# Patient Record
Sex: FEMALE | Race: WHITE | NOT HISPANIC OR LATINO | Employment: OTHER | URBAN - METROPOLITAN AREA
[De-identification: names, ages, dates, MRNs, and addresses within clinical notes are randomized per-mention and may not be internally consistent; named-entity substitution may affect disease eponyms.]

---

## 2017-01-25 ENCOUNTER — HOSPITAL ENCOUNTER (OUTPATIENT)
Dept: RADIOLOGY | Facility: HOSPITAL | Age: 82
Discharge: HOME | End: 2017-01-25
Attending: INTERNAL MEDICINE
Payer: MEDICARE

## 2017-01-25 DIAGNOSIS — Z00.00 HEALTH CARE MAINTENANCE: ICD-10-CM

## 2017-01-25 DIAGNOSIS — Z12.31 ENCOUNTER FOR SCREENING MAMMOGRAM FOR MALIGNANT NEOPLASM OF BREAST: ICD-10-CM

## 2017-01-25 PROCEDURE — 77063 BREAST TOMOSYNTHESIS BI: CPT | Mod: 26,,, | Performed by: RADIOLOGY

## 2017-01-25 PROCEDURE — 77067 SCR MAMMO BI INCL CAD: CPT | Mod: TC

## 2017-01-25 PROCEDURE — 77067 SCR MAMMO BI INCL CAD: CPT | Mod: 26,,, | Performed by: RADIOLOGY

## 2017-03-30 RX ORDER — METFORMIN HYDROCHLORIDE 500 MG/1
TABLET ORAL
Qty: 90 TABLET | Refills: 0 | Status: SHIPPED | OUTPATIENT
Start: 2017-03-30 | End: 2017-06-27 | Stop reason: SDUPTHER

## 2017-04-10 RX ORDER — LANCETS
EACH MISCELLANEOUS
Qty: 200 EACH | Refills: 10 | Status: SHIPPED | OUTPATIENT
Start: 2017-04-10

## 2017-05-08 RX ORDER — BLOOD SUGAR DIAGNOSTIC
STRIP MISCELLANEOUS
Qty: 50 EACH | Refills: 8 | Status: SHIPPED | OUTPATIENT
Start: 2017-05-08 | End: 2018-05-09 | Stop reason: SDUPTHER

## 2017-06-27 ENCOUNTER — TELEPHONE (OUTPATIENT)
Dept: INTERNAL MEDICINE | Facility: CLINIC | Age: 82
End: 2017-06-27

## 2017-06-27 DIAGNOSIS — E11.69 CONTROLLED TYPE 2 DIABETES MELLITUS WITH OTHER SPECIFIED COMPLICATION: Primary | ICD-10-CM

## 2017-06-27 RX ORDER — METFORMIN HYDROCHLORIDE 500 MG/1
TABLET ORAL
Qty: 30 TABLET | Refills: 0 | Status: SHIPPED | OUTPATIENT
Start: 2017-06-27 | End: 2017-07-03 | Stop reason: SDUPTHER

## 2017-06-28 NOTE — TELEPHONE ENCOUNTER
Tired to call patient no answer left message on voicemail for patient to call back to schedule lab appointment

## 2017-06-29 ENCOUNTER — LAB VISIT (OUTPATIENT)
Dept: LAB | Facility: HOSPITAL | Age: 82
End: 2017-06-29
Attending: INTERNAL MEDICINE
Payer: MEDICARE

## 2017-06-29 DIAGNOSIS — E11.69 CONTROLLED TYPE 2 DIABETES MELLITUS WITH OTHER SPECIFIED COMPLICATION: ICD-10-CM

## 2017-06-29 LAB
CHOLEST/HDLC SERPL: 4 {RATIO}
ESTIMATED AVG GLUCOSE: 154 MG/DL
HBA1C MFR BLD HPLC: 7 %
HDL/CHOLESTEROL RATIO: 25.2 %
HDLC SERPL-MCNC: 143 MG/DL
HDLC SERPL-MCNC: 36 MG/DL
LDLC SERPL CALC-MCNC: 86.2 MG/DL
NONHDLC SERPL-MCNC: 107 MG/DL
TRIGL SERPL-MCNC: 104 MG/DL

## 2017-06-29 PROCEDURE — 36415 COLL VENOUS BLD VENIPUNCTURE: CPT | Mod: PO

## 2017-06-29 PROCEDURE — 83036 HEMOGLOBIN GLYCOSYLATED A1C: CPT

## 2017-06-29 PROCEDURE — 80061 LIPID PANEL: CPT

## 2017-07-02 NOTE — PROGRESS NOTES
HISTORY OF PRESENT ILLNESS:  Pt. is a 81 y.o. female presents for monitoring of her DM Type 2, hyperlipdiemia, HTN.  She is UTD with her penumonia vaccines.  She felt Tdap was too expensive, and did not get.  She saw Dr. Aline Galo in [place] for eye exam.    Lab Results   Component Value Date    WBC 11.36 01/04/2016    HGB 12.1 01/04/2016    HCT 38.7 01/04/2016     01/04/2016    CHOL 143 06/29/2017    TRIG 104 06/29/2017    HDL 36 (L) 06/29/2017    ALT 15 12/05/2016    AST 23 12/05/2016     12/05/2016    K 5.3 (H) 12/05/2016     12/05/2016    CREATININE 1.1 12/05/2016    BUN 17 12/05/2016    CO2 25 12/05/2016    TSH 2.353 12/05/2016    HGBA1C 7.0 (H) 06/29/2017     Lab Results   Component Value Date    LDLCALC 86.2 06/29/2017               ROS:  GENERAL: No fever, chills, positive situational fatigability; no weight loss.  SKIN: No rashes, itching or changes in color or texture of skin.  HEAD: No headaches or recent head trauma.  EARS: Denies ear pain, discharge or vertigo.  NOSE: No loss of smell, no epistaxis or postnasal drip.  MOUTH & THROAT: No hoarseness or change in voice. No excessive gum bleeding.  NODES: Denies swollen glands.  CHEST: Denies VILLARREAL, cyanosis, wheezing, cough and sputum production.  CARDIOVASCULAR: Denies chest pain, PND, orthopnea or reduced exercise tolerance.  ABDOMEN: Appetite fine. No weight loss. Denies constipation, diarrhea, abdominal pain, hematemesis or blood in stool.  URINARY: No flank pain, dysuria or hematuria.  PERIPHERAL VASCULAR: No claudication or cyanosis. No edema.  MUSCULOSKELETAL: No joint stiffness or swelling. Denies back pain.  NEUROLOGIC: Denies numbness    PE:   Vitals:   Vitals:    07/03/17 0835   BP: 126/70   Pulse: 72   Resp: 18   Temp: 98.1 °F (36.7 °C)     GENERAL: no acute distress, A&Ox3, comfortable.  Female with BMI of 30   HEENT: tympanic membranes clear, nasal mucosa pink, no pharyngeal erythema or exudate  NECK: supple, no cervical  lymphadenopathy, no thyromegaly; no supraclavicular nodes;   CHEST:  clear to auscultation bilaterally, no crackles or wheeze; no increased work of breathing;  CARDIOVASCULAR: regular rate and rhythm, no rubs, murmurs or gallops.  ABDOMEN: normal bowel sounds, soft non-tender, non-distended; no palpable organomegaly;   EXT: no clubbing, cyanosis or edema.     Protective Sensation (w/ 10 gram monofilament):WNL  Right: Intact  Left: Intact    Visual Inspection:  Normal -  Bilateral and Dry Skin -  Bilateral; positive hair growth;    Pedal Pulses:   Right: Present  Left: Present    Posterior tibialis:   Right:Present  Left: Present        ASSESSMENT/PLAN:    DM type 2/controlled: in control on current meds; will continue;    Type 2 diabetes mellitus with hyperlipidemia  -     glimepiride (AMARYL) 2 MG tablet; Take 1 tablet (2 mg total) by mouth daily with breakfast.  Dispense: 90 tablet; Refill: 1  -     metformin (GLUCOPHAGE) 500 MG tablet; TAKE ONE-HALF TABLET BY MOUTH TWICE DAILY WITH MEALS.  Dispense: 90 tablet; Refill: 1    Hypertension associated with diabetes  -     glimepiride (AMARYL) 2 MG tablet; Take 1 tablet (2 mg total) by mouth daily with breakfast.  Dispense: 90 tablet; Refill: 1  -     metformin (GLUCOPHAGE) 500 MG tablet; TAKE ONE-HALF TABLET BY MOUTH TWICE DAILY WITH MEALS.  Dispense: 90 tablet; Refill: 1    Renal insufficiency  -     Comprehensive metabolic panel; Future; Expected date: 07/03/2017      Call if condition changes or worsens.

## 2017-07-03 ENCOUNTER — LAB VISIT (OUTPATIENT)
Dept: LAB | Facility: HOSPITAL | Age: 82
End: 2017-07-03
Attending: INTERNAL MEDICINE
Payer: MEDICARE

## 2017-07-03 ENCOUNTER — OFFICE VISIT (OUTPATIENT)
Dept: INTERNAL MEDICINE | Facility: CLINIC | Age: 82
End: 2017-07-03
Payer: MEDICARE

## 2017-07-03 VITALS
SYSTOLIC BLOOD PRESSURE: 126 MMHG | BODY MASS INDEX: 30.14 KG/M2 | DIASTOLIC BLOOD PRESSURE: 70 MMHG | HEIGHT: 59 IN | RESPIRATION RATE: 18 BRPM | WEIGHT: 149.5 LBS | TEMPERATURE: 98 F | HEART RATE: 72 BPM

## 2017-07-03 DIAGNOSIS — I15.2 HYPERTENSION ASSOCIATED WITH DIABETES: ICD-10-CM

## 2017-07-03 DIAGNOSIS — E78.5 TYPE 2 DIABETES MELLITUS WITH HYPERLIPIDEMIA: ICD-10-CM

## 2017-07-03 DIAGNOSIS — N28.9 RENAL INSUFFICIENCY: ICD-10-CM

## 2017-07-03 DIAGNOSIS — E11.69 CONTROLLED TYPE 2 DIABETES MELLITUS WITH OTHER SPECIFIED COMPLICATION, WITHOUT LONG-TERM CURRENT USE OF INSULIN: Primary | ICD-10-CM

## 2017-07-03 DIAGNOSIS — E11.69 TYPE 2 DIABETES MELLITUS WITH HYPERLIPIDEMIA: ICD-10-CM

## 2017-07-03 DIAGNOSIS — E11.59 HYPERTENSION ASSOCIATED WITH DIABETES: ICD-10-CM

## 2017-07-03 LAB
ALBUMIN SERPL BCP-MCNC: 3.4 G/DL
ALP SERPL-CCNC: 77 U/L
ALT SERPL W/O P-5'-P-CCNC: 15 U/L
ANION GAP SERPL CALC-SCNC: 7 MMOL/L
AST SERPL-CCNC: 18 U/L
BILIRUB SERPL-MCNC: 0.4 MG/DL
BUN SERPL-MCNC: 23 MG/DL
CALCIUM SERPL-MCNC: 9.1 MG/DL
CHLORIDE SERPL-SCNC: 103 MMOL/L
CO2 SERPL-SCNC: 28 MMOL/L
CREAT SERPL-MCNC: 1 MG/DL
EST. GFR  (AFRICAN AMERICAN): >60 ML/MIN/1.73 M^2
EST. GFR  (NON AFRICAN AMERICAN): 53.3 ML/MIN/1.73 M^2
GLUCOSE SERPL-MCNC: 155 MG/DL
POTASSIUM SERPL-SCNC: 4.9 MMOL/L
PROT SERPL-MCNC: 7.5 G/DL
SODIUM SERPL-SCNC: 138 MMOL/L

## 2017-07-03 PROCEDURE — 1126F AMNT PAIN NOTED NONE PRSNT: CPT | Mod: S$GLB,,, | Performed by: INTERNAL MEDICINE

## 2017-07-03 PROCEDURE — 80053 COMPREHEN METABOLIC PANEL: CPT

## 2017-07-03 PROCEDURE — 1159F MED LIST DOCD IN RCRD: CPT | Mod: S$GLB,,, | Performed by: INTERNAL MEDICINE

## 2017-07-03 PROCEDURE — 99999 PR PBB SHADOW E&M-EST. PATIENT-LVL III: CPT | Mod: PBBFAC,,, | Performed by: INTERNAL MEDICINE

## 2017-07-03 PROCEDURE — 36415 COLL VENOUS BLD VENIPUNCTURE: CPT | Mod: PO

## 2017-07-03 PROCEDURE — 99214 OFFICE O/P EST MOD 30 MIN: CPT | Mod: S$GLB,,, | Performed by: INTERNAL MEDICINE

## 2017-07-03 RX ORDER — GLIMEPIRIDE 2 MG/1
2 TABLET ORAL
Qty: 90 TABLET | Refills: 1 | Status: SHIPPED | OUTPATIENT
Start: 2017-07-03 | End: 2018-01-31 | Stop reason: SDUPTHER

## 2017-07-03 RX ORDER — METFORMIN HYDROCHLORIDE 500 MG/1
TABLET ORAL
Qty: 90 TABLET | Refills: 1 | Status: SHIPPED | OUTPATIENT
Start: 2017-07-03 | End: 2018-01-31 | Stop reason: SDUPTHER

## 2017-07-04 PROBLEM — N28.9 RENAL INSUFFICIENCY: Status: ACTIVE | Noted: 2017-07-04

## 2017-07-05 ENCOUNTER — TELEPHONE (OUTPATIENT)
Dept: ADMINISTRATIVE | Facility: HOSPITAL | Age: 82
End: 2017-07-05

## 2017-07-05 NOTE — LETTER
July 5, 2017    Aline Galo             [facility]  [address]  [facility] [address]  Phone: [phone] July 5, 2017     Patient: Dolores Dolores LeJeune    YOB: 1935   Date of Visit: 7/5/2017       To Whom It May Concern:                                                                                    [facility]    We are seeing Dolores B LeJeune in the clinic today at [facility].  Hiwot Garces MD is their PCP.  She/He has an outstanding lab/procedure at this time when reviewing their chart.  To help with our Health Maintenance records will you please supply the following:        [x]  Eye Exam                                           Please Fax to [facility] at [phone]    Thank you for your help, Rajani Rasmussen LPN-[facility].  If I can be of any assistance you can call at [phone]        [facility]         If you have any questions or concerns, please don't hesitate to call.    Sincerely,        Hiwot Garces MD

## 2017-07-25 RX ORDER — GABAPENTIN 300 MG/1
CAPSULE ORAL
Qty: 90 CAPSULE | Refills: 0 | Status: SHIPPED | OUTPATIENT
Start: 2017-07-25 | End: 2017-10-21 | Stop reason: SDUPTHER

## 2017-10-26 RX ORDER — GABAPENTIN 300 MG/1
CAPSULE ORAL
Qty: 90 CAPSULE | Refills: 0 | Status: SHIPPED | OUTPATIENT
Start: 2017-10-26 | End: 2018-01-31 | Stop reason: SDUPTHER

## 2017-12-19 ENCOUNTER — PATIENT OUTREACH (OUTPATIENT)
Dept: ADMINISTRATIVE | Facility: HOSPITAL | Age: 82
End: 2017-12-19

## 2017-12-19 NOTE — LETTER
December 27, 2017    Dolores B LeJeune  [address] [address] [facility] [address]             [facility]  [address]  [facility] [address]  Phone: [phone] Dear Mrs. LeJeune:    [facility] committed to your overall health.  To help you get the most out of each of your visits, we will review your information to make sure you are up to date on all of your recommended tests and or procedures.       Dr. Hiwot Garces MD has found that you may be due for:     Tetanus and flu vaccine   DEXA scan (osteoporosis screening)   Annual diabetic eye exam   Mammogram     If you have not had an eye exam in the past year, we now have a  Retinal Camera at the [facility].  If we do this exam the same day you see a member of your Primary Care team, we can save you from having to pay a second co pay.       If you have had any of the above done at another facility, please bring the records or information with you so that your record at [facility] will be complete and up to date.     If you have not had any of these tests or procedures done recently and would like to complete this testing before your appointment on 1/2/18 at 9:00 am with Hiwot Garces MD please call [phone] or send a message through your [facility] portal to your provider's office.     If you are currently taking medication, please bring it with you to your appointment for review.     Also, if you have any type of Advanced Directives, please bring them with you to your office visit so we may scan them into your chart.     Please feel free to call the number listed below if you have any questions.     Thanks,     Rajani Rasmussen LPN Clinical Care Coordinator   [facility]   [address] [place] [address]   [phone] (p)   [phone] (f)        Additional Information   If you have questions, you can email [employer] [email or web address] or call [phone]  to talk to our [facility] staff. Remember, [facility] is NOT  to be used for urgent needs. For medical emergencies, dial 911.

## 2018-01-01 NOTE — PROGRESS NOTES
HISTORY OF PRESENT ILLNESS:  Pt. is a 82 y.o. female presents presents for monitoring of her DM Type 2, hyperlipdiemia, HTN.  She sees Dr. Aline Galo for her eye exam, she is due this month or next with her, was last seen 6 months ago.  She is coming due for mammogram, hgb A1C, BMD.  She is seeing Dr. Barrera next month of cards in [place].    Health Maintenance Topics with due status: Not Due       Topic Last Completion Date    Foot Exam 07/03/2017    TETANUS VACCINE 01/02/2018    Lipid Panel 01/02/2018    Hemoglobin A1c 01/02/2018     Health Maintenance Due   Topic Date Due    TETANUS VACCINE  07/15/1954/declines;    Influenza Vaccine  08/01/2017/done this year;    DEXA SCAN  11/03/2017    Eye Exam  12/22/2017/UTD with Dr. Galo;    Hemoglobin A1c  12/29/2017    Mammogram  01/25/2018       Lab Results   Component Value Date    WBC 10.95 01/02/2018    HGB 11.1 (L) 01/02/2018    HCT 35.3 (L) 01/02/2018     01/02/2018    CHOL 127 01/02/2018    TRIG 153 (H) 01/02/2018    HDL 34 (L) 01/02/2018    LDLCALC 62.4 (L) 01/02/2018    ALT 18 01/02/2018    AST 19 01/02/2018     01/02/2018    K 4.7 01/02/2018     01/02/2018    CREATININE 1.0 01/02/2018    BUN 17 01/02/2018    CO2 29 01/02/2018    ALBUMIN 3.4 (L) 01/02/2018    TSH 2.353 12/05/2016    HGBA1C 6.8 (H) 01/02/2018       Past Medical History:   Diagnosis Date    Acute MI     Anticoagulant long-term use     Carpal tunnel syndrome     Cataracts, bilateral     Colon polyps     Coronary artery disease     DM type 2 (diabetes mellitus, type 2)     HTN (hypertension)     Hyperlipidemia     Osteoarthritis     Peripheral neuropathy        Past Surgical History:   Procedure Laterality Date    CAROTID STENT      1    COLONOSCOPY  1/8/2008  Gurvinder    A 3 mm by 6 mm polyp in the cecum.  FRAGMENTS OF TUBULAR ADENOMA.    A 1 mm polyp in the recto-sigmoid colon.  HYPERPLASTIC POLYP.    Extremely redundant colon.      EYE SURGERY      eye  surgery    HYSTERECTOMY      ovaries remain    ptyregium      TONSILLECTOMY         Social History     Social History    Marital status:      Spouse name: N/A    Number of children: 4    Years of education: N/A     Social History Main Topics    Smoking status: Former Smoker     Quit date: 3/15/1972    Smokeless tobacco: Never Used    Alcohol use No    Drug use: No    Sexual activity: Not Asked     Other Topics Concern    None     Social History Narrative    None       ROS:  GENERAL: No fever, chills, fatigability or weight loss.  SKIN: No rashes, itching or changes in color or texture of skin.  HEAD: No headaches or recent head trauma.  EARS: Denies ear pain, discharge or vertigo.  NOSE: No loss of smell, no epistaxis; slight postnasal drip.  MOUTH & THROAT: No hoarseness or change in voice. No excessive gum bleeding.  NODES: Denies swollen glands.  CHEST: Denies VILLARREAL, cyanosis, wheezing, cough and sputum production.  CARDIOVASCULAR: Denies chest pain, PND, orthopnea or reduced exercise tolerance.  ABDOMEN: Appetite fine. No weight loss. Denies constipation, diarrhea, abdominal pain, hematemesis or blood in stool.  URINARY: No flank pain, dysuria or hematuria.  PERIPHERAL VASCULAR: No claudication or cyanosis. No edema.  MUSCULOSKELETAL: Occ joint stiffness; no swelling. Denies back pain.  NEUROLOGIC: Denies numbness    PE:   Vitals:   Vitals:    01/02/18 0851   BP: 118/68   Pulse: 75     GENERAL: no acute distress, A&Ox3, comfortable.  Female with BMI of 29   HEENT: tympanic membranes clear, nasal mucosa pink, no pharyngeal erythema or exudate  NECK: supple, no cervical lymphadenopathy, no thyromegaly; no supraclavicular nodes;   CHEST:  clear to auscultation bilaterally, no crackles or wheeze; no increased work of breathing;  CARDIOVASCULAR: regular rate and rhythm, no rubs, murmurs or gallops.  ABDOMEN: normal bowel sounds, soft non-tender, non-distended; no palpable organomegaly;   EXT: no  clubbing, cyanosis or edema.     ASSESSMENT/PLAN:    Type 2 diabetes mellitus with hyperlipidemia  -     Hemoglobin A1c; Future; Expected date: 01/02/2018  -     Comprehensive metabolic panel; Future; Expected date: 01/02/2018  -     Lipid panel; Future; Expected date: 01/02/2018    Hypertension associated with diabetes  -     CBC auto differential; Future; Expected date: 01/02/2018  -     Comprehensive metabolic panel; Future; Expected date: 01/02/2018    Controlled type 2 diabetes mellitus with other specified complication, without long-term current use of insulin    Health care maintenance  -     DXA Bone Density Spine And Hip; Future; Expected date: 02/05/2018  -     Mammo Digital Screening Bilat with CAD; Future; Expected date: 02/05/2018    Other orders  -     Cancel: DXA Bone Density Spine And Hip; Future; Expected date: 01/01/2018  -     Cancel: Ambulatory Referral to Ophthalmology        Medication List with Changes/Refills   Current Medications    ACCU-CHEK RALEIGH PLUS TEST STRP STRP    USE ONE STRIP FOR TESTING TWICE A DAY AS DIRECTED    ACCU-CHEK SOFTCLIX LANCETS MISC    USE ONE AS DIRECTED TWICE DAILY    ASPIRIN (ECOTRIN) 81 MG EC TABLET    Take 81 mg by mouth once daily.    ATORVASTATIN (LIPITOR) 40 MG TABLET        CALCIUM CARBONATE/VITAMIN D3 (CALCIUM 500 WITH D ORAL)    Take by mouth.      DEXTRAN 70/HYPROMELLOSE (ARTIFICIAL TEARS OPHT)    Apply to eye.    ENALAPRIL (VASOTEC) 5 MG TABLET    Take 1 tablet (5 mg total) by mouth once daily.    GABAPENTIN (NEURONTIN) 300 MG CAPSULE    TAKE ONE CAPSULE BY MOUTH DAILY    GLIMEPIRIDE (AMARYL) 2 MG TABLET    Take 1 tablet (2 mg total) by mouth daily with breakfast.    MECLIZINE (ANTIVERT) 25 MG TABLET    Take 1 tablet by mouth Three times daily as needed.    METFORMIN (GLUCOPHAGE) 500 MG TABLET    TAKE ONE-HALF TABLET BY MOUTH TWICE DAILY WITH MEALS.    RUTIN/HESP/BIOFLAV/C/[id number] (BIOFLEX ORAL)    Take 1 tablet by mouth once daily at 6am.     Call if  condition changes or worsens.

## 2018-01-02 ENCOUNTER — LAB VISIT (OUTPATIENT)
Dept: LAB | Facility: HOSPITAL | Age: 83
End: 2018-01-02
Attending: INTERNAL MEDICINE
Payer: MEDICARE

## 2018-01-02 ENCOUNTER — OFFICE VISIT (OUTPATIENT)
Dept: INTERNAL MEDICINE | Facility: CLINIC | Age: 83
End: 2018-01-02
Payer: MEDICARE

## 2018-01-02 ENCOUNTER — TELEPHONE (OUTPATIENT)
Dept: INTERNAL MEDICINE | Facility: CLINIC | Age: 83
End: 2018-01-02

## 2018-01-02 VITALS
WEIGHT: 147.69 LBS | DIASTOLIC BLOOD PRESSURE: 68 MMHG | OXYGEN SATURATION: 98 % | BODY MASS INDEX: 29.83 KG/M2 | HEART RATE: 75 BPM | SYSTOLIC BLOOD PRESSURE: 118 MMHG

## 2018-01-02 DIAGNOSIS — E11.69 CONTROLLED TYPE 2 DIABETES MELLITUS WITH OTHER SPECIFIED COMPLICATION, WITHOUT LONG-TERM CURRENT USE OF INSULIN: ICD-10-CM

## 2018-01-02 DIAGNOSIS — Z00.00 HEALTH CARE MAINTENANCE: ICD-10-CM

## 2018-01-02 DIAGNOSIS — I15.2 HYPERTENSION ASSOCIATED WITH DIABETES: ICD-10-CM

## 2018-01-02 DIAGNOSIS — E78.5 TYPE 2 DIABETES MELLITUS WITH HYPERLIPIDEMIA: Primary | ICD-10-CM

## 2018-01-02 DIAGNOSIS — E11.59 HYPERTENSION ASSOCIATED WITH DIABETES: ICD-10-CM

## 2018-01-02 DIAGNOSIS — D64.9 ANEMIA, UNSPECIFIED TYPE: Primary | ICD-10-CM

## 2018-01-02 DIAGNOSIS — E78.5 TYPE 2 DIABETES MELLITUS WITH HYPERLIPIDEMIA: ICD-10-CM

## 2018-01-02 DIAGNOSIS — E11.69 TYPE 2 DIABETES MELLITUS WITH HYPERLIPIDEMIA: Primary | ICD-10-CM

## 2018-01-02 DIAGNOSIS — E11.69 TYPE 2 DIABETES MELLITUS WITH HYPERLIPIDEMIA: ICD-10-CM

## 2018-01-02 LAB
ALBUMIN SERPL BCP-MCNC: 3.4 G/DL
ALP SERPL-CCNC: 73 U/L
ALT SERPL W/O P-5'-P-CCNC: 18 U/L
ANION GAP SERPL CALC-SCNC: 8 MMOL/L
AST SERPL-CCNC: 19 U/L
BASOPHILS # BLD AUTO: 0.04 K/UL
BASOPHILS NFR BLD: 0.4 %
BILIRUB SERPL-MCNC: 0.5 MG/DL
BUN SERPL-MCNC: 17 MG/DL
CALCIUM SERPL-MCNC: 9.4 MG/DL
CHLORIDE SERPL-SCNC: 104 MMOL/L
CHOLEST SERPL-MCNC: 127 MG/DL
CHOLEST/HDLC SERPL: 3.7 {RATIO}
CO2 SERPL-SCNC: 29 MMOL/L
CREAT SERPL-MCNC: 1 MG/DL
DIFFERENTIAL METHOD: ABNORMAL
EOSINOPHIL # BLD AUTO: 0.3 K/UL
EOSINOPHIL NFR BLD: 2.6 %
ERYTHROCYTE [DISTWIDTH] IN BLOOD BY AUTOMATED COUNT: 13.1 %
EST. GFR  (AFRICAN AMERICAN): >60 ML/MIN/1.73 M^2
EST. GFR  (NON AFRICAN AMERICAN): 53 ML/MIN/1.73 M^2
ESTIMATED AVG GLUCOSE: 148 MG/DL
GLUCOSE SERPL-MCNC: 121 MG/DL
HBA1C MFR BLD HPLC: 6.8 %
HCT VFR BLD AUTO: 35.3 %
HDLC SERPL-MCNC: 34 MG/DL
HDLC SERPL: 26.8 %
HGB BLD-MCNC: 11.1 G/DL
IMM GRANULOCYTES # BLD AUTO: 0.03 K/UL
IMM GRANULOCYTES NFR BLD AUTO: 0.3 %
LDLC SERPL CALC-MCNC: 62.4 MG/DL
LYMPHOCYTES # BLD AUTO: 2.8 K/UL
LYMPHOCYTES NFR BLD: 25.7 %
MCH RBC QN AUTO: 28.6 PG
MCHC RBC AUTO-ENTMCNC: 31.4 G/DL
MCV RBC AUTO: 91 FL
MONOCYTES # BLD AUTO: 0.8 K/UL
MONOCYTES NFR BLD: 6.8 %
NEUTROPHILS # BLD AUTO: 7 K/UL
NEUTROPHILS NFR BLD: 64.2 %
NONHDLC SERPL-MCNC: 93 MG/DL
NRBC BLD-RTO: 0 /100 WBC
PLATELET # BLD AUTO: 286 K/UL
PMV BLD AUTO: 10.7 FL
POTASSIUM SERPL-SCNC: 4.7 MMOL/L
PROT SERPL-MCNC: 7.5 G/DL
RBC # BLD AUTO: 3.88 M/UL
SODIUM SERPL-SCNC: 141 MMOL/L
TRIGL SERPL-MCNC: 153 MG/DL
WBC # BLD AUTO: 10.95 K/UL

## 2018-01-02 PROCEDURE — 80061 LIPID PANEL: CPT

## 2018-01-02 PROCEDURE — 83036 HEMOGLOBIN GLYCOSYLATED A1C: CPT

## 2018-01-02 PROCEDURE — 99214 OFFICE O/P EST MOD 30 MIN: CPT | Mod: S$GLB,,, | Performed by: INTERNAL MEDICINE

## 2018-01-02 PROCEDURE — 85025 COMPLETE CBC W/AUTO DIFF WBC: CPT

## 2018-01-02 PROCEDURE — 80053 COMPREHEN METABOLIC PANEL: CPT

## 2018-01-02 PROCEDURE — 36415 COLL VENOUS BLD VENIPUNCTURE: CPT | Mod: PO

## 2018-01-02 PROCEDURE — 99999 PR PBB SHADOW E&M-EST. PATIENT-LVL III: CPT | Mod: PBBFAC,,, | Performed by: INTERNAL MEDICINE

## 2018-01-02 NOTE — LETTER
January 22, 2018    Dr Clover Guan - Internal Medicine  [address] [facility] [address] [place] [address]  Phone: [phone]  Fax: [phone] January 22, 2018     Patient: Dolores Dolores LeJeune    YOB: 1935   Date of Visit: 1/2/2018       To Whom It May Concern:                                                                                    [facility]    We are seeing Dolores B LeJeune in the clinic today at [facility].  Hiwot Garces MD is their PCP.  She/He has an outstanding lab/procedure at this time when reviewing their chart.  To help with our Health Maintenance records will you please supply the following:       [x]  Eye Exam                                       Please Fax to [facility] at [phone]    Thank you for your help, Rajani Rasmussen LPN-[facility].  If I can be of any assistance you can call at [phone]        [facility]         If you have any questions or concerns, please don't hesitate to call.    Sincerely,        Hiwot Garces MD

## 2018-01-02 NOTE — LETTER
January 8, 2018    Dr Clover Guan - Internal Medicine  [address] [facility] [address] [place] [address]  Phone: [phone]  Fax: [phone] January 8, 2018     Patient: Dolores Dolores LeJeune    YOB: 1935   Date of Visit: 1/2/2018       To Whom It May Concern:                                                                                    [facility]    We are seeing Dolores B LeJeune in the clinic today at [facility].  Hiwot Garces MD is their PCP.  She/He has an outstanding lab/procedure at this time when reviewing their chart.  To help with our Health Maintenance records will you please supply the following:       [x]  Eye Exam                                        Please Fax to [facility] at [phone]    Thank you for your help, Rajani Rasmussen LPN-[facility].  If I can be of any assistance you can call at [phone]        [facility]         If you have any questions or concerns, please don't hesitate to call.    Sincerely,        Hiwot Garces MD

## 2018-01-02 NOTE — TELEPHONE ENCOUNTER
Pt. Is UTD with eye exam with Dr. Aline Galo in [place], had appt. 6 months ago, has appt. Coming up this month or next.

## 2018-01-15 ENCOUNTER — LAB VISIT (OUTPATIENT)
Dept: LAB | Facility: HOSPITAL | Age: 83
End: 2018-01-15
Attending: INTERNAL MEDICINE
Payer: MEDICARE

## 2018-01-15 DIAGNOSIS — D64.9 ANEMIA, UNSPECIFIED TYPE: ICD-10-CM

## 2018-01-15 LAB
FERRITIN SERPL-MCNC: 87 NG/ML
IRON SERPL-MCNC: 75 UG/DL
SATURATED IRON: 25 %
TOTAL IRON BINDING CAPACITY: 303 UG/DL
TRANSFERRIN SERPL-MCNC: 205 MG/DL

## 2018-01-15 PROCEDURE — 82728 ASSAY OF FERRITIN: CPT

## 2018-01-15 PROCEDURE — 83540 ASSAY OF IRON: CPT

## 2018-01-15 PROCEDURE — 36415 COLL VENOUS BLD VENIPUNCTURE: CPT | Mod: PO

## 2018-01-16 ENCOUNTER — PATIENT MESSAGE (OUTPATIENT)
Dept: INTERNAL MEDICINE | Facility: CLINIC | Age: 83
End: 2018-01-16

## 2018-01-21 NOTE — PROGRESS NOTES
HISTORY OF PRESENT ILLNESS:  Pt. is a 82 y.o. female presents today with hypotension.  She had gotten appt, originally for severe headaches with chest pain.  She has been stressed out a lot.  On Friday she started with a headache, coughing a lot, couldn't sleep.  Denies fever, positive muscle aches.    Health Maintenance Topics with due status: Not Due       Topic Last Completion Date    Foot Exam 07/03/2017    TETANUS VACCINE 01/02/2018    Lipid Panel 01/02/2018    Hemoglobin A1c 01/02/2018     Health Maintenance Due   Topic Date Due    DEXA SCAN  11/03/2017    Eye Exam  12/22/2017    Mammogram  01/25/2018       Lab Results   Component Value Date    WBC 10.95 01/02/2018    HGB 11.1 (L) 01/02/2018    HCT 35.3 (L) 01/02/2018     01/02/2018    CHOL 127 01/02/2018    TRIG 153 (H) 01/02/2018    HDL 34 (L) 01/02/2018    LDLCALC 62.4 (L) 01/02/2018    ALT 18 01/02/2018    AST 19 01/02/2018     01/02/2018    K 4.7 01/02/2018     01/02/2018    CREATININE 1.0 01/02/2018    BUN 17 01/02/2018    CO2 29 01/02/2018    ALBUMIN 3.4 (L) 01/02/2018    TSH 2.353 12/05/2016    HGBA1C 6.8 (H) 01/02/2018       Past Medical History:   Diagnosis Date    Acute MI     Anticoagulant long-term use     Carpal tunnel syndrome     Cataracts, bilateral     Colon polyps     Coronary artery disease     DM type 2 (diabetes mellitus, type 2)     HTN (hypertension)     Hyperlipidemia     Osteoarthritis     Peripheral neuropathy        Past Surgical History:   Procedure Laterality Date    CAROTID STENT      1    COLONOSCOPY  1/8/2008  Gurvinder    A 3 mm by 6 mm polyp in the cecum.  FRAGMENTS OF TUBULAR ADENOMA.    A 1 mm polyp in the recto-sigmoid colon.  HYPERPLASTIC POLYP.    Extremely redundant colon.      EYE SURGERY      eye surgery    HYSTERECTOMY      ovaries remain    ptyregium      TONSILLECTOMY         Social History     Social History    Marital status:      Spouse name: N/A    Number of children:  4    Years of education: N/A     Social History Main Topics    Smoking status: Former Smoker     Quit date: 3/15/1972    Smokeless tobacco: Never Used    Alcohol use No    Drug use: No    Sexual activity: Not on file     Other Topics Concern    Not on file     Social History Narrative    No narrative on file       ROS:  GENERAL: No fever, chills, positive fatigability; no weight loss.  SKIN: No rashes, itching or changes in color or texture of skin.  HEAD: No headaches or recent head trauma.  EARS: Denies ear pain, discharge or vertigo.  NOSE: No loss of smell, no epistaxis or postnasal drip.  MOUTH & THROAT: No hoarseness or change in voice. No excessive gum bleeding.  NODES: Denies swollen glands.  CHEST: Denies VILLARREAL, cyanosis, wheezing, cough and sputum production.  CARDIOVASCULAR: Denies chest pain, PND, orthopnea or reduced exercise tolerance.  ABDOMEN: Appetite fine. No weight loss. Denies constipation, diarrhea, abdominal pain, hematemesis or blood in stool.  URINARY: No flank pain, dysuria or hematuria.  PERIPHERAL VASCULAR: No claudication or cyanosis. No edema.  MUSCULOSKELETAL: No joint stiffness or swelling. Denies back pain; positive muscle aches  NEUROLOGIC: Denies numbness    ASSESSMENT/PLAN:  Pt. Will be sent to ER due to hypotension and probable flu.        Medication List with Changes/Refills   Current Medications    ACCU-CHEK RALEIGH PLUS TEST STRP STRP    USE ONE STRIP FOR TESTING TWICE A DAY AS DIRECTED    ACCU-CHEK SOFTCLIX LANCETS MISC    USE ONE AS DIRECTED TWICE DAILY    ASPIRIN (ECOTRIN) 81 MG EC TABLET    Take 81 mg by mouth once daily.    ATORVASTATIN (LIPITOR) 40 MG TABLET        CALCIUM CARBONATE/VITAMIN D3 (CALCIUM 500 WITH D ORAL)    Take by mouth.      DEXTRAN 70/HYPROMELLOSE (ARTIFICIAL TEARS OPHT)    Apply to eye.    ENALAPRIL (VASOTEC) 5 MG TABLET    Take 1 tablet (5 mg total) by mouth once daily.    GABAPENTIN (NEURONTIN) 300 MG CAPSULE    TAKE ONE CAPSULE BY MOUTH DAILY     GLIMEPIRIDE (AMARYL) 2 MG TABLET    Take 1 tablet (2 mg total) by mouth daily with breakfast.    MECLIZINE (ANTIVERT) 25 MG TABLET    Take 1 tablet by mouth Three times daily as needed.    METFORMIN (GLUCOPHAGE) 500 MG TABLET    TAKE ONE-HALF TABLET BY MOUTH TWICE DAILY WITH MEALS.    RUTIN/HESP/BIOFLAV/C/[id number] (BIOFLEX ORAL)    Take 1 tablet by mouth once daily at 6am.     Call if condition changes or worsens.  Answers for HPI/ROS submitted by the patient on 1/21/2018   activity change: No  unexpected weight change: No  neck pain: Yes  hearing loss: No  rhinorrhea: Yes  trouble swallowing: Yes  eye discharge: No  visual disturbance: No  chest tightness: Yes  wheezing: Yes  chest pain: Yes  palpitations: Yes  blood in stool: No  constipation: No  vomiting: No  diarrhea: No  polydipsia: No  polyuria: No  difficulty urinating: No  hematuria: No  menstrual problem: No  dysuria: No  joint swelling: No  arthralgias: No  headaches: Yes  weakness: Yes  confusion: No  dysphoric mood: No

## 2018-01-22 ENCOUNTER — OFFICE VISIT (OUTPATIENT)
Dept: INTERNAL MEDICINE | Facility: CLINIC | Age: 83
End: 2018-01-22
Payer: MEDICARE

## 2018-01-22 VITALS
OXYGEN SATURATION: 96 % | HEART RATE: 86 BPM | TEMPERATURE: 98 F | WEIGHT: 144.38 LBS | HEIGHT: 59 IN | BODY MASS INDEX: 29.11 KG/M2 | DIASTOLIC BLOOD PRESSURE: 46 MMHG | SYSTOLIC BLOOD PRESSURE: 90 MMHG | RESPIRATION RATE: 20 BRPM

## 2018-01-22 DIAGNOSIS — I95.9 HYPOTENSION, UNSPECIFIED HYPOTENSION TYPE: Primary | ICD-10-CM

## 2018-01-22 PROBLEM — N17.9 AKI (ACUTE KIDNEY INJURY): Status: ACTIVE | Noted: 2018-01-22

## 2018-01-22 PROBLEM — R42 ORTHOSTATIC DIZZINESS: Status: ACTIVE | Noted: 2018-01-22

## 2018-01-22 PROBLEM — J20.9 ACUTE BRONCHITIS: Status: ACTIVE | Noted: 2018-01-22

## 2018-01-22 PROBLEM — I21.4 NSTEMI (NON-ST ELEVATED MYOCARDIAL INFARCTION): Status: ACTIVE | Noted: 2018-01-22

## 2018-01-22 PROCEDURE — 99212 OFFICE O/P EST SF 10 MIN: CPT | Mod: S$GLB,,, | Performed by: INTERNAL MEDICINE

## 2018-01-22 PROCEDURE — 99999 PR PBB SHADOW E&M-EST. PATIENT-LVL III: CPT | Mod: PBBFAC,,, | Performed by: INTERNAL MEDICINE

## 2018-01-23 PROBLEM — R79.89 ELEVATED TROPONIN: Status: ACTIVE | Noted: 2018-01-22

## 2018-01-23 PROBLEM — R79.89 ELEVATED TROPONIN: Status: ACTIVE | Noted: 2018-01-23

## 2018-01-25 PROBLEM — R79.89 ELEVATED TROPONIN: Status: RESOLVED | Noted: 2018-01-23 | Resolved: 2018-01-25

## 2018-01-26 ENCOUNTER — TELEPHONE (OUTPATIENT)
Dept: VASCULAR SURGERY | Facility: CLINIC | Age: 83
End: 2018-01-26

## 2018-01-26 NOTE — TELEPHONE ENCOUNTER
----- Message from Ryan Foss sent at 1/26/2018  1:40 PM CST -----  Contact: [facility] [phone]  The nurse called asking if patient can be transferred from ICU to the floor. Call was placed to the pod.Thanks!

## 2018-01-31 ENCOUNTER — TELEPHONE (OUTPATIENT)
Dept: VASCULAR SURGERY | Facility: CLINIC | Age: 83
End: 2018-01-31

## 2018-01-31 DIAGNOSIS — E78.5 TYPE 2 DIABETES MELLITUS WITH HYPERLIPIDEMIA: ICD-10-CM

## 2018-01-31 DIAGNOSIS — I15.2 HYPERTENSION ASSOCIATED WITH DIABETES: ICD-10-CM

## 2018-01-31 DIAGNOSIS — E11.59 HYPERTENSION ASSOCIATED WITH DIABETES: ICD-10-CM

## 2018-01-31 DIAGNOSIS — E11.69 TYPE 2 DIABETES MELLITUS WITH HYPERLIPIDEMIA: ICD-10-CM

## 2018-01-31 RX ORDER — GABAPENTIN 300 MG/1
CAPSULE ORAL
Qty: 90 CAPSULE | Refills: 0 | Status: SHIPPED | OUTPATIENT
Start: 2018-01-31 | End: 2018-04-27 | Stop reason: SDUPTHER

## 2018-01-31 RX ORDER — GLIMEPIRIDE 2 MG/1
TABLET ORAL
Qty: 90 TABLET | Refills: 0 | Status: SHIPPED | OUTPATIENT
Start: 2018-01-31 | End: 2018-04-27 | Stop reason: SDUPTHER

## 2018-01-31 NOTE — TELEPHONE ENCOUNTER
----- Message from Rober Miles sent at 1/31/2018 12:01 PM CST -----  Contact: /Esteban Orellana called in regarding the attached patient (wife) and stated patient was discharged on 1/30/18 from [facility].  Dr. Ballesteros did bypass surgery on patient on 1/24/18 and patient needs a 1 week post op in [place].  (post op not available to schedule until end of March).    Patient call back number is [phone]

## 2018-01-31 NOTE — TELEPHONE ENCOUNTER
Po appt scheduled on 2/14 in [place]. To call if want staples/sutures removed by nurse before that appt.

## 2018-02-01 RX ORDER — METFORMIN HYDROCHLORIDE 500 MG/1
TABLET ORAL
Qty: 90 TABLET | Refills: 0 | Status: SHIPPED | OUTPATIENT
Start: 2018-02-01 | End: 2018-08-08 | Stop reason: SDUPTHER

## 2018-02-06 DIAGNOSIS — Z95.1 S/P CABG (CORONARY ARTERY BYPASS GRAFT): ICD-10-CM

## 2018-02-06 DIAGNOSIS — I25.10 CORONARY ARTERY DISEASE, ANGINA PRESENCE UNSPECIFIED, UNSPECIFIED VESSEL OR LESION TYPE, UNSPECIFIED WHETHER NATIVE OR TRANSPLANTED HEART: Primary | ICD-10-CM

## 2018-02-14 ENCOUNTER — TELEPHONE (OUTPATIENT)
Dept: VASCULAR SURGERY | Facility: CLINIC | Age: 83
End: 2018-02-14

## 2018-02-14 ENCOUNTER — OFFICE VISIT (OUTPATIENT)
Dept: VASCULAR SURGERY | Facility: CLINIC | Age: 83
End: 2018-02-14
Payer: MEDICARE

## 2018-02-14 ENCOUNTER — PATIENT OUTREACH (OUTPATIENT)
Dept: ADMINISTRATIVE | Facility: HOSPITAL | Age: 83
End: 2018-02-14

## 2018-02-14 DIAGNOSIS — Z95.1 S/P CABG (CORONARY ARTERY BYPASS GRAFT): Primary | ICD-10-CM

## 2018-02-14 PROCEDURE — 99999 PR PBB SHADOW E&M-EST. PATIENT-LVL I: CPT | Mod: PBBFAC,,, | Performed by: THORACIC SURGERY (CARDIOTHORACIC VASCULAR SURGERY)

## 2018-02-14 PROCEDURE — 99024 POSTOP FOLLOW-UP VISIT: CPT | Mod: S$GLB,,, | Performed by: THORACIC SURGERY (CARDIOTHORACIC VASCULAR SURGERY)

## 2018-02-14 NOTE — LETTER
February 22, 2018    Dolores B LeJeune  [address] [address] [facility] [address]             [facility]  [address]  [facility] [address]  Phone: [phone] Dear Mrs. LeJeune:    We have tried to reach you by mychart unsuccessfully.    [facility] is committed to your overall health.  To help you get the most out of each of your visits, we will review your information to make sure you are up to date on all of your recommended tests and or procedures.       Dr. Hiwot Garces MD has found that you may be due for:     Osteoporosis screening   Mammogram   Annual diabetic eye exam     If you have not had an eye exam in the past year, we now have a  Retinal Camera at the [place] [facility].  If we do this exam the same day you see a member of your Primary Care team, we can save you from having to pay a second co pay.      If you have had any of the above done at another facility, please bring the records or information with you so that your record at [facility] will be complete and up to date.     If you have not had any of these tests or procedures done recently and would like to complete this testing before your appointment on 12/28/18 at 5:40 pm with Hiwot Garces MD please call [phone] or send a message through your [facility] portal to your provider's office.     If you are currently taking medication, please bring it with you to your appointment for review.     Also, if you have any type of Advanced Directives, please bring them with you to your office visit so we may scan them into your chart.     Please feel free to call the number listed below if you have any questions.     Thanks,     Rajani Rasmussen LPN Clinical Care Coordinator   [facility]   [address] [place] [address]   [phone] (p)   [phone] (f)      Additional Information   If you have questions, you can email [email or web address] or call [phone]  to talk to our [facility] staff.  Remember, [facility] is NOT to be used for urgent needs. For medical emergencies, dial 911.

## 2018-02-14 NOTE — TELEPHONE ENCOUNTER
----- Message from Jolene Willis sent at 2/14/2018 11:57 AM CST -----  Please call [place] Codey [email or web address] in regards's to order that was just sent, she states she cant read it, [phone] ext [phone]

## 2018-02-15 NOTE — PROGRESS NOTES
OFFICE NOTE    HISTORY OF PRESENT ILLNESS:  This is an 82-year-old lady status post coronary   artery bypass grafting.  She returns to the office today in followup.  She has   done reasonably well, but has had some difficulty with strength and activity   level, but has slowly improved.  She is getting regular home health to help her   with recovery.  Medicines are noted.  The problem list is reviewed.    On exam, her surgical wounds are clean and dry.  There is no evidence of   infection.    I think overall she is progressing fairly well.  She should continue home health   and she can see us on an as-needed basis, but I think her overall prognosis   should be still fairly good.  She should follow up with her cardiologist for   medical management.      JORGE/SUSANNE  dd: 02/14/2018 09:45:46 (CST)  td: 02/15/2018 00:18:06 (CST)  Doc ID   #[id number]  Job ID #[id number]    CC: Shayla Galvez MD

## 2018-02-28 ENCOUNTER — LAB VISIT (OUTPATIENT)
Dept: LAB | Facility: HOSPITAL | Age: 83
End: 2018-02-28
Attending: INTERNAL MEDICINE
Payer: MEDICARE

## 2018-02-28 ENCOUNTER — OFFICE VISIT (OUTPATIENT)
Dept: INTERNAL MEDICINE | Facility: CLINIC | Age: 83
End: 2018-02-28
Payer: MEDICARE

## 2018-02-28 VITALS
TEMPERATURE: 98 F | SYSTOLIC BLOOD PRESSURE: 100 MMHG | WEIGHT: 137.56 LBS | OXYGEN SATURATION: 97 % | HEART RATE: 81 BPM | BODY MASS INDEX: 27.73 KG/M2 | HEIGHT: 59 IN | RESPIRATION RATE: 18 BRPM | DIASTOLIC BLOOD PRESSURE: 47 MMHG

## 2018-02-28 DIAGNOSIS — I25.10 CORONARY ARTERY DISEASE, ANGINA PRESENCE UNSPECIFIED, UNSPECIFIED VESSEL OR LESION TYPE, UNSPECIFIED WHETHER NATIVE OR TRANSPLANTED HEART: Primary | ICD-10-CM

## 2018-02-28 DIAGNOSIS — Z09 HOSPITAL DISCHARGE FOLLOW-UP: ICD-10-CM

## 2018-02-28 DIAGNOSIS — I25.10 CORONARY ARTERY DISEASE, ANGINA PRESENCE UNSPECIFIED, UNSPECIFIED VESSEL OR LESION TYPE, UNSPECIFIED WHETHER NATIVE OR TRANSPLANTED HEART: ICD-10-CM

## 2018-02-28 PROCEDURE — 80053 COMPREHEN METABOLIC PANEL: CPT

## 2018-02-28 PROCEDURE — 36415 COLL VENOUS BLD VENIPUNCTURE: CPT | Mod: PO

## 2018-02-28 PROCEDURE — 85025 COMPLETE CBC W/AUTO DIFF WBC: CPT

## 2018-02-28 PROCEDURE — 3078F DIAST BP <80 MM HG: CPT | Mod: S$GLB,,, | Performed by: INTERNAL MEDICINE

## 2018-02-28 PROCEDURE — 99999 PR PBB SHADOW E&M-EST. PATIENT-LVL IV: CPT | Mod: PBBFAC,,, | Performed by: INTERNAL MEDICINE

## 2018-02-28 PROCEDURE — 3074F SYST BP LT 130 MM HG: CPT | Mod: S$GLB,,, | Performed by: INTERNAL MEDICINE

## 2018-02-28 PROCEDURE — 99213 OFFICE O/P EST LOW 20 MIN: CPT | Mod: S$GLB,,, | Performed by: INTERNAL MEDICINE

## 2018-02-28 RX ORDER — MUPIROCIN 20 MG/G
OINTMENT TOPICAL 3 TIMES DAILY
Qty: 22 G | Refills: 0 | Status: SHIPPED | OUTPATIENT
Start: 2018-02-28 | End: 2022-03-03

## 2018-02-28 RX ORDER — ATORVASTATIN CALCIUM 40 MG/1
40 TABLET, FILM COATED ORAL DAILY
Qty: 90 TABLET | Refills: 1 | Status: SHIPPED | OUTPATIENT
Start: 2018-02-28 | End: 2018-04-30 | Stop reason: SDUPTHER

## 2018-02-28 RX ORDER — METOPROLOL SUCCINATE 25 MG/1
25 TABLET, EXTENDED RELEASE ORAL DAILY
Qty: 90 TABLET | Refills: 1 | Status: SHIPPED | OUTPATIENT
Start: 2018-02-28 | End: 2018-11-28

## 2018-02-28 RX ORDER — ENALAPRIL MALEATE 5 MG/1
5 TABLET ORAL DAILY
Qty: 90 TABLET | Refills: 1 | Status: SHIPPED | OUTPATIENT
Start: 2018-02-28 | End: 2018-07-09

## 2018-02-28 NOTE — PROGRESS NOTES
HISTORY OF PRESENT ILLNESS:  Pt. is a 82 y.o. female presents after hospitalization for NSTEMI.    Progress Notes   Subhash Rubi MD (Physician)   Hospitalist      DISCHARGE CLINIC NOTE  ENCOUNTER DATE: February 15, 2018  TRANSITIONAL CARE PERIOD: January 30, 2018 +30 days  PCP: Hiwot Garces MD     Subjective:   Discharging Physician: Dr. Rondon [facility] Course Documentation:  Patient admitted with NSTEMI. Cardiology consulted. 1/23/18 Taken for C with  and multivessel CAD noted. Consulted CV surgery for CABG.   1/24/18 Coronary artery bypass grafting x4 using left internal mammary artery to left anterior descending coronary artery and saphenous vein from the aorta to the obtuse marginal 1, to the obtuse marginal 2 and to the   posterior descending coronary artery in a sequential fashion using a combination of antegrade and retrograde cardioplegia, mild systemic hypothermia and endoscopic vein harvest.  She had acute bronchitis noted on admit and completed a course of Levaquin. DAY resolved with IVF and was noted on admission. Etiology likely DAY due to dehydration and hypotension  DM controlled on home meds at discharge  Statin continued for hyperlipidemia     Please see thorough post discharge phone call documentation performed within 48 hours of discharge date.     Dolores B LeJeune is a 82 y.o. female who presents today for a hospital follow up appointment. She was hospitalized at [facility] from January 22 to January 29, 2018. She initially presented with low blood pressure and acute MI with peak troponin of 1.180. Left heart cath demonstrated severe coronary artery disease. 2-D echocardiogram showed LVEF of 45-50%.  On January 24, 2018 she underwent a 4 vessel CABG by Dr. Ballesteros with an LIMA to the LAD, and SVG to OM1, SVG to OM 2, and SVG to PDA.  She also had acute renal failure with initial creatinine of 1.95 that it trended down to 0.88 by the time of discharge.     Since  discharge, she is doing extremely well. She continues to have home health and home PT. She does report easy fatigability but is gaining endurance. She has been nauseated with a decreased appetite but no vomiting. She had a postoperative visit with Dr. Ballesteros yesterday (February 14, 2018) and was noted to be healing satisfactorily and released from [facility] care.  Forthcoming she is to follow-up with her cardiologist, Dr. Galvez. She is going to begin cardiac rehabilitation in the near future.           She is changing to Dr. Galvez from Dr. Barrera.  She has been released from Dr. Ballesteros.    Health Maintenance Topics with due status: Not Due       Topic Last Completion Date    Foot Exam 07/03/2017    TETANUS VACCINE 01/02/2018    Hemoglobin A1c 01/02/2018    Lipid Panel 01/23/2018     Health Maintenance Due   Topic Date Due    DEXA SCAN  11/03/2017    Eye Exam  12/22/2017    Mammogram  01/25/2018       Lab Results   Component Value Date    WBC 11.93 02/28/2018    HGB 10.1 (L) 02/28/2018    HCT 31.8 (L) 02/28/2018     02/28/2018    CHOL 89 (L) 01/23/2018    TRIG 106 01/23/2018    HDL 29 (L) 01/23/2018    LDLCALC 38.8 (L) 01/23/2018    ALT 16 02/28/2018    AST 29 02/28/2018     02/28/2018    K 4.9 02/28/2018     02/28/2018    CREATININE 1.2 02/28/2018    BUN 23 02/28/2018    CO2 23 02/28/2018    ALBUMIN 3.2 (L) 02/28/2018    TSH 2.353 12/05/2016    INR 1.4 01/24/2018    HGBA1C 6.8 (H) 01/02/2018       Past Medical History:   Diagnosis Date    Acute MI     Anticoagulant long-term use     Carpal tunnel syndrome     Cataracts, bilateral     Colon polyps     Coronary artery disease     DM type 2 (diabetes mellitus, type 2)     HTN (hypertension)     Hyperlipidemia     Osteoarthritis     Peripheral neuropathy        Past Surgical History:   Procedure Laterality Date    CAROTID STENT      1    COLONOSCOPY  1/8/2008  [facility]    A 3 mm by 6 mm polyp in the cecum.  FRAGMENTS OF TUBULAR ADENOMA.     A 1 mm polyp in the recto-sigmoid colon.  HYPERPLASTIC POLYP.    Extremely redundant colon.      CORONARY ARTERY BYPASS GRAFT  01/24/2018    [facility], Dr. Echols. LIMA to LAD, SVG to OM1, SVG to OM2, SVG to PDA    EYE SURGERY      eye surgery    HYSTERECTOMY      ovaries remain    ptyregium      TONSILLECTOMY         Social History     Social History    Marital status:      Spouse name: N/A    Number of children: 4    Years of education: N/A     Social History Main Topics    Smoking status: Former Smoker     Quit date: 3/15/1972    Smokeless tobacco: Never Used    Alcohol use No    Drug use: No    Sexual activity: Not Asked     Other Topics Concern    None     Social History Narrative    None       ROS:  GENERAL: No fever, chills, fatigability or weight loss.  SKIN: No rashes, itching or changes in color or texture of skin.  HEAD: No headaches or recent head trauma.  EARS: Denies ear pain, discharge or vertigo.  NOSE: No loss of smell, no epistaxis or postnasal drip.  MOUTH & THROAT: No hoarseness or change in voice. No excessive gum bleeding.  NODES: Denies swollen glands.  CHEST: Denies VILLARREAL, cyanosis, wheezing, cough and sputum production.  CARDIOVASCULAR: Denies chest pain, PND, orthopnea or reduced exercise tolerance.  ABDOMEN: Appetite fine. No weight loss. Denies constipation, diarrhea, abdominal pain, hematemesis or blood in stool.  URINARY: No flank pain, dysuria or hematuria.  PERIPHERAL VASCULAR: No claudication or cyanosis. No edema.  MUSCULOSKELETAL: No joint stiffness or swelling. Denies back pain.  NEUROLOGIC: Denies numbness    PE:   Vitals:   Vitals:    02/28/18 1714   BP: (!) 100/47   Pulse: 81   Resp: 18   Temp: 97.8 °F (36.6 °C)     GENERAL: no acute distress, A&Ox3, comfortable.  Female with BMI of 27   HEENT: tympanic membranes clear, nasal mucosa pink, no pharyngeal erythema or exudate  NECK: supple, no cervical lymphadenopathy, no thyromegaly; no supraclavicular nodes;    CHEST:  clear to auscultation bilaterally, no crackles or wheeze; no increased work of breathing;  CARDIOVASCULAR: regular rate and rhythm, no rubs, murmurs or gallops.  ABDOMEN: normal bowel sounds, soft non-tender, non-distended; no palpable organomegaly;   EXT: no clubbing, cyanosis or edema.     ASSESSMENT/PLAN:    Hospital discharge followup; S/p CABG: have reviewed her notes from hospital; is concerned about weight changes; she is following up with Dr. Silvia Soto in 3 months; reviewed her presurgical CXR and retroperitoneal US; no masses appreciated; recent CBC improving; kidney function decreased from hospital readings; protein slightly decreased; encouraged protein intake;       Medication List with Changes/Refills   New Medications    MUPIROCIN (BACTROBAN) 2 % OINTMENT    Apply topically 3 (three) times daily.   Current Medications    ACCU-CHEK RALEIGH PLUS TEST STRP STRP    USE ONE STRIP FOR TESTING TWICE A DAY AS DIRECTED    ACCU-CHEK SOFTCLIX LANCETS MISC    USE ONE AS DIRECTED TWICE DAILY    ASPIRIN (ECOTRIN) 81 MG EC TABLET    Take 81 mg by mouth once daily.    CALCIUM CARBONATE/VITAMIN D3 (CALCIUM 500 WITH D ORAL)    Take by mouth.      DEXTRAN 70/HYPROMELLOSE (ARTIFICIAL TEARS OPHT)    Apply to eye.    GABAPENTIN (NEURONTIN) 300 MG CAPSULE    TAKE ONE CAPSULE BY MOUTH DAILY    GLIMEPIRIDE (AMARYL) 2 MG TABLET    TAKE ONE TABLET BY MOUTH DAILY WITH BREAKFAST    MECLIZINE (ANTIVERT) 25 MG TABLET    Take 1 tablet by mouth Three times daily as needed.    METFORMIN (GLUCOPHAGE) 500 MG TABLET    TAKE ONE-HALF TABLET BY MOUTH TWICE DAILY WITH MEALS.    PANTOPRAZOLE (PROTONIX) 40 MG TABLET    Take 1 tablet (40 mg total) by mouth once daily.    RUTIN/HESP/BIOFLAV/C/[id number] (BIOFLEX ORAL)    Take 1 tablet by mouth once daily at 6am.   Changed and/or Refilled Medications    Modified Medication Previous Medication    ATORVASTATIN (LIPITOR) 40 MG TABLET atorvastatin (LIPITOR) 40 MG tablet       Take 1 tablet  (40 mg total) by mouth once daily.        ENALAPRIL (VASOTEC) 5 MG TABLET enalapril (VASOTEC) 5 MG tablet       Take 1 tablet (5 mg total) by mouth once daily.    Take 1 tablet (5 mg total) by mouth once daily.    METOPROLOL SUCCINATE (TOPROL-XL) 25 MG 24 HR TABLET metoprolol succinate (TOPROL-XL) 25 MG 24 hr tablet       Take 1 tablet (25 mg total) by mouth once daily.    Take 1 tablet (25 mg total) by mouth once daily.   Discontinued Medications    HYDROCODONE-ACETAMINOPHEN 7.5-325MG (NORCO) 7.5-325 MG PER TABLET    Take 1 tablet by mouth every 4 (four) hours as needed.     Call if condition changes or worsens.  Answers for HPI/ROS submitted by the patient on 2/26/2018   activity change: Yes  unexpected weight change: Yes  neck pain: No  hearing loss: No  rhinorrhea: No  trouble swallowing: No  eye discharge: No  visual disturbance: No  chest tightness: No  wheezing: No  chest pain: No  palpitations: No  blood in stool: No  constipation: No  vomiting: No  diarrhea: No  polydipsia: No  polyuria: No  difficulty urinating: No  hematuria: No  menstrual problem: No  dysuria: No  joint swelling: No  arthralgias: No  headaches: No  weakness: No  confusion: No  dysphoric mood: No

## 2018-03-01 LAB
ALBUMIN SERPL BCP-MCNC: 3.2 G/DL
ALP SERPL-CCNC: 72 U/L
ALT SERPL W/O P-5'-P-CCNC: 16 U/L
ANION GAP SERPL CALC-SCNC: 13 MMOL/L
AST SERPL-CCNC: 29 U/L
BASOPHILS # BLD AUTO: 0.04 K/UL
BASOPHILS NFR BLD: 0.3 %
BILIRUB SERPL-MCNC: 0.5 MG/DL
BUN SERPL-MCNC: 23 MG/DL
CALCIUM SERPL-MCNC: 9.2 MG/DL
CHLORIDE SERPL-SCNC: 100 MMOL/L
CO2 SERPL-SCNC: 23 MMOL/L
CREAT SERPL-MCNC: 1.2 MG/DL
DIFFERENTIAL METHOD: ABNORMAL
EOSINOPHIL # BLD AUTO: 0.2 K/UL
EOSINOPHIL NFR BLD: 2 %
ERYTHROCYTE [DISTWIDTH] IN BLOOD BY AUTOMATED COUNT: 14.7 %
EST. GFR  (AFRICAN AMERICAN): 49 ML/MIN/1.73 M^2
EST. GFR  (NON AFRICAN AMERICAN): 42.5 ML/MIN/1.73 M^2
GLUCOSE SERPL-MCNC: 71 MG/DL
HCT VFR BLD AUTO: 31.8 %
HGB BLD-MCNC: 10.1 G/DL
IMM GRANULOCYTES # BLD AUTO: 0.06 K/UL
IMM GRANULOCYTES NFR BLD AUTO: 0.5 %
LYMPHOCYTES # BLD AUTO: 3 K/UL
LYMPHOCYTES NFR BLD: 25 %
MCH RBC QN AUTO: 28.8 PG
MCHC RBC AUTO-ENTMCNC: 31.8 G/DL
MCV RBC AUTO: 91 FL
MONOCYTES # BLD AUTO: 0.8 K/UL
MONOCYTES NFR BLD: 6.4 %
NEUTROPHILS # BLD AUTO: 7.9 K/UL
NEUTROPHILS NFR BLD: 65.8 %
NRBC BLD-RTO: 0 /100 WBC
PLATELET # BLD AUTO: 344 K/UL
PMV BLD AUTO: 10.9 FL
POTASSIUM SERPL-SCNC: 4.9 MMOL/L
PROT SERPL-MCNC: 7.7 G/DL
RBC # BLD AUTO: 3.51 M/UL
SODIUM SERPL-SCNC: 136 MMOL/L
WBC # BLD AUTO: 11.93 K/UL

## 2018-03-03 ENCOUNTER — TELEPHONE (OUTPATIENT)
Dept: INTERNAL MEDICINE | Facility: CLINIC | Age: 83
End: 2018-03-03

## 2018-03-03 PROBLEM — I25.10 CORONARY ARTERY DISEASE: Status: ACTIVE | Noted: 2018-03-03

## 2018-03-13 ENCOUNTER — TELEPHONE (OUTPATIENT)
Dept: CARDIOLOGY | Facility: CLINIC | Age: 83
End: 2018-03-13

## 2018-03-13 NOTE — TELEPHONE ENCOUNTER
----- Message from Brenda Thompson sent at 3/13/2018 11:00 AM CDT -----  Contact: self  Patient needs 6 week appointment due to discharged 01/30/18 [facility] open heart surgery. Dr Galvez saw her in the hospital. Please call patient at [phone]. Thanks!

## 2018-04-09 ENCOUNTER — OFFICE VISIT (OUTPATIENT)
Dept: OTOLARYNGOLOGY | Facility: CLINIC | Age: 83
End: 2018-04-09
Payer: MEDICARE

## 2018-04-09 VITALS — HEIGHT: 59 IN | BODY MASS INDEX: 28.72 KG/M2 | WEIGHT: 142.44 LBS

## 2018-04-09 DIAGNOSIS — H61.23 BILATERAL IMPACTED CERUMEN: ICD-10-CM

## 2018-04-09 PROCEDURE — 99999 PR PBB SHADOW E&M-EST. PATIENT-LVL III: CPT | Mod: PBBFAC,,, | Performed by: OTOLARYNGOLOGY

## 2018-04-09 PROCEDURE — 99203 OFFICE O/P NEW LOW 30 MIN: CPT | Mod: S$GLB,,, | Performed by: OTOLARYNGOLOGY

## 2018-04-09 NOTE — Clinical Note
April 12, 2018      Hiwot Garces MD  [address] [facility] [address] [place] [address]           [facility]  [address] [facility] [address] [place] [address]  Phone: [phone]  Fax: [phone]          Patient: Dolores B LeJeune   MR Number: [id number]   YOB: 1935   Date of Visit: 4/9/2018       Dear Dr. Hiwot Garces:    Thank you for referring Dolores LeJeune to me for evaluation. Attached you will find relevant portions of my assessment and plan of care.    If you have questions, please do not hesitate to call me. I look forward to following Dolores LeJeune along with you.    Sincerely,    Brian Dos Santos MD    Enclosure  CC:  No Recipients    If you would like to receive this communication electronically, please contact [email or web address] or [phone] to request more information on [facility] access.    For providers and/or their staff who would like to refer a patient to [facility], please contact us through our one-stop-shop provider referral line, [facility], at [phone].    If you feel you have received this communication in error or would no longer like to receive these types of communications, please e-mail [email or web address]

## 2018-04-12 PROBLEM — H61.23 BILATERAL IMPACTED CERUMEN: Status: ACTIVE | Noted: 2018-04-12

## 2018-04-12 NOTE — PROGRESS NOTES
Subjective:       Patient ID: Dolores B LeJeune is a 82 y.o. female.    Chief Complaint: Cerumen Impaction      Kelly is here for cerumen issues. Symptoms have been present for months. No otorrhea, no vertigo, no otalgia. hearing down. Does not use q-tips. No previous ear trauma or issue. Equal on both sides.       History   Smoking Status    Former Smoker    Quit date: 3/15/1972   Smokeless Tobacco    Never Used     History   Alcohol Use No          Review of Systems   Constitutional: Negative for activity change and appetite change.   Eyes: Negative for discharge.   Respiratory: Negative for difficulty breathing and wheezing   Cardiovascular: Negative for chest pain.   Gastrointestinal: Negative for abdominal distention and abdominal pain.   Endocrine: Negative for cold intolerance and heat intolerance.   Genitourinary: Negative for dysuria.   Musculoskeletal: Negative for gait problem and joint swelling.   Skin: Negative for color change and pallor.   Neurological: Negative for syncope and weakness.   Psychiatric/Behavioral: Negative for agitation and confusion.     Objective:        Constitutional:   She is oriented to person, place, and time. She appears well-developed and well-nourished. She appears alert. She is active. Normal speech.      Head:  Normocephalic and atraumatic. Head is without TMJ tenderness. No scars. Salivary glands normal.  Facial strength is normal.      Ears:    Right Ear: No drainage or swelling. No middle ear effusion.   Left Ear: No drainage or swelling.  No middle ear effusion.   Dense cerumen impactions cleaned under microscopy.    Nose:  No mucosal edema, rhinorrhea or sinus tenderness. No turbinate hypertrophy.      Mouth/Throat  Oropharynx clear and moist without lesions or asymmetry, normal uvula midline and mirror exam normal. Normal dentition. No uvula swelling, lacerations or trismus. No oropharyngeal exudate. Tonsillar erythema, tonsillar exudate.      Neck:  Full range  of motion with neck supple and no adenopathy. Thyroid tenderness is present. No tracheal deviation, no edema, no erythema, normal range of motion, no stridor, no crepitus and no neck rigidity present. No thyroid mass present.     Cardiovascular:   Intact distal pulses and normal pulses.      Pulmonary/Chest:   Effort normal and breath sounds normal. No stridor.     Psychiatric:   Her speech is normal and behavior is normal. Her mood appears not anxious. Her affect is not labile.     Neurological:   She is alert and oriented to person, place, and time. No sensory deficit.     Skin:   No abrasions, lacerations, lesions, or rashes. No abrasion and no bruising noted.         Tests / Results:  None    Assessment:       1. Bilateral impacted cerumen          Plan:         Improvement in hearing following ear exam and cerumen cleaning.   She will follow-up with me as needed if any persistent issues.

## 2018-04-27 DIAGNOSIS — E11.59 HYPERTENSION ASSOCIATED WITH DIABETES: ICD-10-CM

## 2018-04-27 DIAGNOSIS — E78.5 TYPE 2 DIABETES MELLITUS WITH HYPERLIPIDEMIA: ICD-10-CM

## 2018-04-27 DIAGNOSIS — E11.69 TYPE 2 DIABETES MELLITUS WITH HYPERLIPIDEMIA: ICD-10-CM

## 2018-04-27 DIAGNOSIS — I15.2 HYPERTENSION ASSOCIATED WITH DIABETES: ICD-10-CM

## 2018-04-27 RX ORDER — GLIMEPIRIDE 2 MG/1
TABLET ORAL
Qty: 30 TABLET | Refills: 0 | Status: SHIPPED | OUTPATIENT
Start: 2018-04-27 | End: 2018-05-28 | Stop reason: SDUPTHER

## 2018-04-27 RX ORDER — GABAPENTIN 300 MG/1
CAPSULE ORAL
Qty: 90 CAPSULE | Refills: 0 | Status: SHIPPED | OUTPATIENT
Start: 2018-04-27 | End: 2018-08-08 | Stop reason: SDUPTHER

## 2018-04-30 ENCOUNTER — OFFICE VISIT (OUTPATIENT)
Dept: CARDIOLOGY | Facility: CLINIC | Age: 83
End: 2018-04-30
Payer: MEDICARE

## 2018-04-30 VITALS
DIASTOLIC BLOOD PRESSURE: 61 MMHG | HEART RATE: 75 BPM | SYSTOLIC BLOOD PRESSURE: 111 MMHG | HEIGHT: 59 IN | WEIGHT: 145.75 LBS | BODY MASS INDEX: 29.38 KG/M2

## 2018-04-30 DIAGNOSIS — I10 ESSENTIAL HYPERTENSION: ICD-10-CM

## 2018-04-30 DIAGNOSIS — I22.2 SUBSEQUENT NON-ST ELEVATION (NSTEMI) MYOCARDIAL INFARCTION: ICD-10-CM

## 2018-04-30 DIAGNOSIS — Z79.02 LONG TERM (CURRENT) USE OF ANTITHROMBOTICS/ANTIPLATELETS: ICD-10-CM

## 2018-04-30 DIAGNOSIS — I87.2 VENOUS INSUFFICIENCY OF LEFT LEG: ICD-10-CM

## 2018-04-30 DIAGNOSIS — E78.00 HYPERCHOLESTEROLEMIA: ICD-10-CM

## 2018-04-30 DIAGNOSIS — I25.5 ISCHEMIC CARDIOMYOPATHY: Primary | ICD-10-CM

## 2018-04-30 PROCEDURE — 3074F SYST BP LT 130 MM HG: CPT | Mod: S$GLB,,, | Performed by: INTERNAL MEDICINE

## 2018-04-30 PROCEDURE — 99999 PR PBB SHADOW E&M-EST. PATIENT-LVL III: CPT | Mod: PBBFAC,,, | Performed by: INTERNAL MEDICINE

## 2018-04-30 PROCEDURE — 3078F DIAST BP <80 MM HG: CPT | Mod: S$GLB,,, | Performed by: INTERNAL MEDICINE

## 2018-04-30 PROCEDURE — 99214 OFFICE O/P EST MOD 30 MIN: CPT | Mod: S$GLB,,, | Performed by: INTERNAL MEDICINE

## 2018-04-30 RX ORDER — CLOPIDOGREL BISULFATE 75 MG/1
75 TABLET ORAL DAILY
Qty: 90 TABLET | Refills: 1 | Status: SHIPPED | OUTPATIENT
Start: 2018-04-30 | End: 2018-10-27 | Stop reason: SDUPTHER

## 2018-04-30 RX ORDER — ATORVASTATIN CALCIUM 40 MG/1
40 TABLET, FILM COATED ORAL DAILY
Qty: 90 TABLET | Refills: 1 | Status: SHIPPED | OUTPATIENT
Start: 2018-04-30 | End: 2019-05-13 | Stop reason: SDUPTHER

## 2018-04-30 NOTE — PROGRESS NOTES
Subjective:    Patient ID:  Dolores B LeJeune is a 82 y.o. female who presents for Coronary Artery Disease (S/P CABG ); Hypertension; and Hyperlipidemia        HPI  S/P NSTEMI, CATH THEN CABG PER DR PRADO, STARTED REHAB FINISHED 12 SESSIONS, LLE EDEMA, SITE OF VEINS HARVEST, ECHO EF 40-45%, DIASTOLIC DYSFUNCTION, SEE ROS    Past Medical History:   Diagnosis Date    Acute MI     Anticoagulant long-term use     Carpal tunnel syndrome     Cataracts, bilateral     Colon polyps     Coronary artery disease     DM type 2 (diabetes mellitus, type 2)     HTN (hypertension)     Hyperlipidemia     Osteoarthritis     Peripheral neuropathy      Past Surgical History:   Procedure Laterality Date    CAROTID STENT      1    COLONOSCOPY  1/8/2008  [facility]    A 3 mm by 6 mm polyp in the cecum.  FRAGMENTS OF TUBULAR ADENOMA.    A 1 mm polyp in the recto-sigmoid colon.  HYPERPLASTIC POLYP.    Extremely redundant colon.      CORONARY ARTERY BYPASS GRAFT  01/24/2018    [facility], Dr. Echols. LIMA to LAD, SVG to OM1, SVG to OM2, SVG to PDA    EYE SURGERY      eye surgery    HYSTERECTOMY      ovaries remain    ptyregium      TONSILLECTOMY       Family History   Problem Relation Age of Onset    Tremor Mother     Hypertension Mother     Heart disease Mother     Stroke Father      Social History     Social History    Marital status:      Spouse name: N/A    Number of children: 4    Years of education: N/A     Social History Main Topics    Smoking status: Former Smoker     Quit date: 3/15/1972    Smokeless tobacco: Never Used    Alcohol use No    Drug use: No    Sexual activity: Not on file     Other Topics Concern    Not on file     Social History Narrative    No narrative on file       Review of patient's allergies indicates:   Allergen Reactions    Albuterol Other (See Comments)     Palpitations/tremor      Sulfa (sulfonamide antibiotics) Rash       Current Outpatient Prescriptions:     ACCU-CHEK RALEIGH  PLUS TEST STRP Strp, USE ONE STRIP FOR TESTING TWICE A DAY AS DIRECTED, Disp: 50 each, Rfl: 8    ACCU-CHEK SOFTCLIX LANCETS Misc, USE ONE AS DIRECTED TWICE DAILY, Disp: 200 each, Rfl: 10    aspirin (ECOTRIN) 81 MG EC tablet, Take 81 mg by mouth once daily., Disp: , Rfl:     atorvastatin (LIPITOR) 40 MG tablet, Take 1 tablet (40 mg total) by mouth once daily., Disp: 90 tablet, Rfl: 1    CALCIUM CARBONATE/VITAMIN D3 (CALCIUM 500 WITH D ORAL), Take by mouth.  , Disp: , Rfl:     DEXTRAN 70/HYPROMELLOSE (ARTIFICIAL TEARS OPHT), Apply to eye., Disp: , Rfl:     enalapril (VASOTEC) 5 MG tablet, Take 1 tablet (5 mg total) by mouth once daily., Disp: 90 tablet, Rfl: 1    gabapentin (NEURONTIN) 300 MG capsule, TAKE ONE CAPSULE BY MOUTH DAILY, Disp: 90 capsule, Rfl: 0    glimepiride (AMARYL) 2 MG tablet, TAKE ONE TABLET BY MOUTH DAILY WITH BREAKFAST, Disp: 30 tablet, Rfl: 0    meclizine (ANTIVERT) 25 mg tablet, Take 1 tablet by mouth Three times daily as needed., Disp: , Rfl:     metFORMIN (GLUCOPHAGE) 500 MG tablet, TAKE ONE-HALF TABLET BY MOUTH TWICE DAILY WITH MEALS., Disp: 90 tablet, Rfl: 0    metoprolol succinate (TOPROL-XL) 25 MG 24 hr tablet, Take 1 tablet (25 mg total) by mouth once daily., Disp: 90 tablet, Rfl: 1    mupirocin (BACTROBAN) 2 % ointment, Apply topically 3 (three) times daily., Disp: 22 g, Rfl: 0    pantoprazole (PROTONIX) 40 MG tablet, Take 1 tablet (40 mg total) by mouth once daily., Disp: 30 tablet, Rfl: 11    RUTIN/HESP/BIOFLAV/C/BKJDUL112 (BIOFLEX ORAL), Take 1 tablet by mouth once daily at 6am., Disp: , Rfl:     clopidogrel (PLAVIX) 75 mg tablet, Take 1 tablet (75 mg total) by mouth once daily., Disp: 90 tablet, Rfl: 1    Review of Systems   Constitution: Negative for chills, diaphoresis, fever, weakness, malaise/fatigue and night sweats.   HENT: Negative for congestion, hearing loss and nosebleeds.    Eyes: Negative for blurred vision, discharge, double vision and visual disturbance.  "  Cardiovascular: Negative for chest pain, claudication, cyanosis, dyspnea on exertion, irregular heartbeat, leg swelling (LLE), near-syncope, orthopnea, palpitations, paroxysmal nocturnal dyspnea and syncope.   Respiratory: Negative for cough, hemoptysis, shortness of breath, sleep disturbances due to breathing, sputum production and wheezing.    Endocrine: Negative for cold intolerance, heat intolerance and polyuria.   Hematologic/Lymphatic: Negative for adenopathy. Does not bruise/bleed easily.   Skin: Negative for color change, itching and rash.   Musculoskeletal: Negative for back pain, falls and joint pain.   Gastrointestinal: Negative for abdominal pain, change in bowel habit, constipation, flatus, heartburn, hematemesis, jaundice, melena and vomiting.   Genitourinary: Negative for dysuria, flank pain and frequency.   Neurological: Negative for brief paralysis, dizziness, focal weakness, light-headedness, loss of balance, numbness, paresthesias, seizures, sensory change and tremors.   Psychiatric/Behavioral: Negative for altered mental status, depression and memory loss. The patient is not nervous/anxious.    Allergic/Immunologic: Negative for hives and persistent infections.        Objective:      Vitals:    04/30/18 1430   BP: 111/61   Pulse: 75   Weight: 66.1 kg (145 lb 11.6 oz)   Height: 4' 11" (1.499 m)   PainSc: 0-No pain     Body mass index is 29.43 kg/m².    Physical Exam   Constitutional: She is oriented to person, place, and time. She appears well-developed and well-nourished. She is active.   HENT:   Head: Normocephalic and atraumatic.   Mouth/Throat: Oropharynx is clear and moist and mucous membranes are normal.   Eyes: Conjunctivae and EOM are normal. Pupils are equal, round, and reactive to light.   Neck: Trachea normal and normal range of motion. Neck supple. Normal carotid pulses, no hepatojugular reflux and no JVD present. Carotid bruit is not present. No tracheal deviation, no edema and no " erythema present. No thyromegaly present.   Cardiovascular: Normal rate, regular rhythm and normal heart sounds.   No extrasystoles are present. PMI is not displaced.  Exam reveals no gallop and no friction rub.    No murmur heard.  Pulses:       Carotid pulses are 2+ on the right side, and 2+ on the left side.       Radial pulses are 2+ on the right side, and 2+ on the left side.        Femoral pulses are 2+ on the right side, and 2+ on the left side.       Popliteal pulses are 2+ on the right side, and 2+ on the left side.        Dorsalis pedis pulses are 2+ on the right side, and 2+ on the left side.        Posterior tibial pulses are 2+ on the right side, and 2+ on the left side.   Pulmonary/Chest: Effort normal and breath sounds normal. No tachypnea and no bradypnea. She has no wheezes. She has no rales. She exhibits no tenderness.   Abdominal: Soft. Bowel sounds are normal. She exhibits no distension and no mass. There is no hepatosplenomegaly. There is no tenderness. There is no guarding and no CVA tenderness.   Musculoskeletal: Normal range of motion. She exhibits no edema or tenderness.   TRACE TO MILD EDEMA LLE, VARICOSE VEINS   Lymphadenopathy:     She has no cervical adenopathy.   Neurological: She is alert and oriented to person, place, and time. She has normal strength. She displays no tremor. No cranial nerve deficit (MILDLY [place]). She exhibits normal muscle tone. Coordination normal.   Skin: Skin is warm and dry. No rash noted. No cyanosis or erythema. No pallor.   Psychiatric: She has a normal mood and affect. Her speech is normal and behavior is normal. Judgment and thought content normal.               ..    Chemistry        Component Value Date/Time     02/28/2018 1811    K 4.9 02/28/2018 1811     02/28/2018 1811    CO2 23 02/28/2018 1811    BUN 23 02/28/2018 1811    CREATININE 1.2 02/28/2018 1811    GLU 71 02/28/2018 1811        Component Value Date/Time    CALCIUM 9.2 02/28/2018 1811     ALKPHOS 72 02/28/2018 1811    AST 29 02/28/2018 1811    ALT 16 02/28/2018 1811    BILITOT 0.5 02/28/2018 1811    ESTGFRAFRICA 49.0 (A) 02/28/2018 1811    EGFRNONAA 42.5 (A) 02/28/2018 1811            ..  Lab Results   Component Value Date    CHOL 89 (L) 01/23/2018    CHOL 127 01/02/2018    CHOL 143 06/29/2017     Lab Results   Component Value Date    HDL 29 (L) 01/23/2018    HDL 34 (L) 01/02/2018    HDL 36 (L) 06/29/2017     Lab Results   Component Value Date    LDLCALC 38.8 (L) 01/23/2018    LDLCALC 62.4 (L) 01/02/2018    LDLCALC 86.2 06/29/2017     Lab Results   Component Value Date    TRIG 106 01/23/2018    TRIG 153 (H) 01/02/2018    TRIG 104 06/29/2017     Lab Results   Component Value Date    CHOLHDL 32.6 01/23/2018    CHOLHDL 26.8 01/02/2018    CHOLHDL 25.2 06/29/2017     ..  Lab Results   Component Value Date    WBC 11.93 02/28/2018    HGB 10.1 (L) 02/28/2018    HCT 31.8 (L) 02/28/2018    MCV 91 02/28/2018     02/28/2018       Test(s) Reviewed  I have reviewed the following in detail:  [] Stress test   [x] Angiography   [] Echocardiogram   [x] Labs   [x] Other:       Assessment:         ICD-10-CM ICD-9-CM   1. Ischemic cardiomyopathy I25.5 414.8   2. Long term (current) use of antithrombotics/antiplatelets Z79.02 V58.63   3. Subsequent non-ST elevation (NSTEMI) myocardial infarction I22.2 410.70   4. Essential hypertension I10 401.9   5. Hypercholesterolemia E78.00 272.0     Problem List Items Addressed This Visit        Cardiac/Vascular    Hypertension    Hypercholesterolemia    Subsequent non-ST elevation (NSTEMI) myocardial infarction    Ischemic cardiomyopathy - Primary       Hematology    Long term (current) use of antithrombotics/antiplatelets           Plan:     ADD PLAVIX, NSTEMI, CHECK LABS, ALL CV CLINICALLY STABLE, NO ANGINA, NO HF, NO TIA, NO CLINICAL ARRHYTHMIA,CONTINUE CURRENT MEDS, EDUCATION, DIET, EXERCISE, CONTINUE REHAB,STOCKINGS DURING THE DAY, REMOVE QHS, RTC IN 6-8  WEEKS WITH  LABS      Ischemic cardiomyopathy    Long term (current) use of antithrombotics/antiplatelets    Subsequent non-ST elevation (NSTEMI) myocardial infarction    Essential hypertension    Hypercholesterolemia    Other orders  -     atorvastatin (LIPITOR) 40 MG tablet; Take 1 tablet (40 mg total) by mouth once daily.  Dispense: 90 tablet; Refill: 1  -     clopidogrel (PLAVIX) 75 mg tablet; Take 1 tablet (75 mg total) by mouth once daily.  Dispense: 90 tablet; Refill: 1    RTC Low level/low impact aerobic exercise 5x's/wk. Heart healthy diet and risk factor modification.    See labs and med orders.    Aerobic exercise 5x's/wk. Heart healthy diet and risk factor modification.    See labs and med orders.

## 2018-05-02 ENCOUNTER — TELEPHONE (OUTPATIENT)
Dept: CARDIOLOGY | Facility: CLINIC | Age: 83
End: 2018-05-02

## 2018-05-02 NOTE — TELEPHONE ENCOUNTER
Called pt back and left VM informing patient that per Dr. Galvez she needs to get the mild density compression stockings.

## 2018-05-02 NOTE — TELEPHONE ENCOUNTER
----- Message from Lu Joseph sent at 5/2/2018 10:54 AM CDT -----  Doctor told patient that she needed compression stockings but did not tell her the density. Please call to advise at [phone].

## 2018-05-02 NOTE — TELEPHONE ENCOUNTER
Received call from pt. Pt stated that Dr. Galvez wanted her to get compression stockings to wear during day but did not specify what density. Message sent to Dr. Galvez for info

## 2018-05-09 RX ORDER — BLOOD SUGAR DIAGNOSTIC
STRIP MISCELLANEOUS
Qty: 50 EACH | Refills: 7 | Status: SHIPPED | OUTPATIENT
Start: 2018-05-09 | End: 2018-05-28 | Stop reason: SDUPTHER

## 2018-05-28 ENCOUNTER — LAB VISIT (OUTPATIENT)
Dept: LAB | Facility: HOSPITAL | Age: 83
End: 2018-05-28
Attending: FAMILY MEDICINE
Payer: MEDICARE

## 2018-05-28 ENCOUNTER — OFFICE VISIT (OUTPATIENT)
Dept: FAMILY MEDICINE | Facility: CLINIC | Age: 83
End: 2018-05-28
Payer: MEDICARE

## 2018-05-28 VITALS
DIASTOLIC BLOOD PRESSURE: 58 MMHG | HEART RATE: 68 BPM | BODY MASS INDEX: 29.11 KG/M2 | SYSTOLIC BLOOD PRESSURE: 138 MMHG | TEMPERATURE: 98 F | WEIGHT: 144.38 LBS | HEIGHT: 59 IN | OXYGEN SATURATION: 98 %

## 2018-05-28 DIAGNOSIS — E11.69 TYPE 2 DIABETES MELLITUS WITH HYPERLIPIDEMIA: ICD-10-CM

## 2018-05-28 DIAGNOSIS — J30.1 SEASONAL ALLERGIC RHINITIS DUE TO POLLEN: ICD-10-CM

## 2018-05-28 DIAGNOSIS — E78.5 TYPE 2 DIABETES MELLITUS WITH HYPERLIPIDEMIA: ICD-10-CM

## 2018-05-28 DIAGNOSIS — N28.9 RENAL INSUFFICIENCY: ICD-10-CM

## 2018-05-28 DIAGNOSIS — E11.59 HYPERTENSION ASSOCIATED WITH DIABETES: ICD-10-CM

## 2018-05-28 DIAGNOSIS — E11.8 TYPE 2 DIABETES MELLITUS WITH COMPLICATION, WITHOUT LONG-TERM CURRENT USE OF INSULIN: Primary | ICD-10-CM

## 2018-05-28 DIAGNOSIS — N39.3 SUI (STRESS URINARY INCONTINENCE, FEMALE): ICD-10-CM

## 2018-05-28 DIAGNOSIS — I15.2 HYPERTENSION ASSOCIATED WITH DIABETES: ICD-10-CM

## 2018-05-28 DIAGNOSIS — E11.8 TYPE 2 DIABETES MELLITUS WITH COMPLICATION, WITHOUT LONG-TERM CURRENT USE OF INSULIN: ICD-10-CM

## 2018-05-28 LAB
ESTIMATED AVG GLUCOSE: 126 MG/DL
HBA1C MFR BLD HPLC: 6 %

## 2018-05-28 PROCEDURE — 36415 COLL VENOUS BLD VENIPUNCTURE: CPT | Mod: PO

## 2018-05-28 PROCEDURE — 3075F SYST BP GE 130 - 139MM HG: CPT | Mod: S$GLB,,, | Performed by: FAMILY MEDICINE

## 2018-05-28 PROCEDURE — 99214 OFFICE O/P EST MOD 30 MIN: CPT | Mod: S$GLB,,, | Performed by: FAMILY MEDICINE

## 2018-05-28 PROCEDURE — 83036 HEMOGLOBIN GLYCOSYLATED A1C: CPT

## 2018-05-28 PROCEDURE — 3078F DIAST BP <80 MM HG: CPT | Mod: S$GLB,,, | Performed by: FAMILY MEDICINE

## 2018-05-28 PROCEDURE — 99999 PR PBB SHADOW E&M-EST. PATIENT-LVL III: CPT | Mod: PBBFAC,,, | Performed by: FAMILY MEDICINE

## 2018-05-28 RX ORDER — OXYBUTYNIN CHLORIDE 5 MG/1
5 TABLET, EXTENDED RELEASE ORAL DAILY
Qty: 30 TABLET | Refills: 11 | Status: SHIPPED | OUTPATIENT
Start: 2018-05-28 | End: 2019-04-01 | Stop reason: SDUPTHER

## 2018-05-28 RX ORDER — IPRATROPIUM BROMIDE 42 UG/1
2 SPRAY, METERED NASAL 3 TIMES DAILY
Qty: 15 ML | Refills: 5 | Status: SHIPPED | OUTPATIENT
Start: 2018-05-28 | End: 2019-05-08 | Stop reason: SDUPTHER

## 2018-05-28 RX ORDER — GLIMEPIRIDE 2 MG/1
2 TABLET ORAL
Qty: 90 TABLET | Refills: 3 | Status: SHIPPED | OUTPATIENT
Start: 2018-05-28 | End: 2019-05-16 | Stop reason: SDUPTHER

## 2018-05-28 RX ORDER — LOSARTAN POTASSIUM 25 MG/1
25 TABLET ORAL DAILY
COMMUNITY
Start: 2018-05-21 | End: 2019-05-16 | Stop reason: SDUPTHER

## 2018-05-28 NOTE — PROGRESS NOTES
"Subjective:       Patient ID: Dolores B LeJeune is a 82 y.o. female.    Chief Complaint: Establish Care (follow up  3mo. Open heart surgery Jan 2018)    This pt is new to me.  Pt had a CABG x 4 in January 2018--she is doing well now.  She is slowing getting back to her normal activity.  She reorts a cough at night--she say Dr. Estevez in [place]--he stopped her lisinopril and started Losartan.  She still has her cough.  Her last HgA1c in 1/2018 was 6.8.  Her glucoses at home have been 100-110.      Review of Systems   Constitutional: Negative for activity change, appetite change, fatigue and unexpected weight change.   HENT: Positive for postnasal drip and rhinorrhea.    Eyes: Negative for visual disturbance.   Respiratory: Positive for cough (dry--mostly at night). Negative for chest tightness and shortness of breath.    Cardiovascular: Negative for chest pain, palpitations and leg swelling.   Gastrointestinal: Negative for abdominal pain, constipation, diarrhea, nausea and vomiting.   Endocrine: Negative for cold intolerance, heat intolerance and polyuria.   Genitourinary: Positive for enuresis and urgency. Negative for decreased urine volume and dysuria.   Musculoskeletal: Negative for arthralgias and back pain.   Skin: Negative for rash.   Neurological: Negative for numbness and headaches.       Objective:       Vitals:    05/28/18 1122   BP: (!) 138/58   BP Location: Left arm   Patient Position: Sitting   BP Method: Medium (Manual)   Pulse: 68   Temp: 98.1 °F (36.7 °C)   TempSrc: Oral   SpO2: 98%   Weight: 65.5 kg (144 lb 6.4 oz)   Height: 4' 11" (1.499 m)     Physical Exam   Constitutional: She is oriented to person, place, and time. She appears well-developed and well-nourished.   HENT:   Head: Normocephalic.   boggy nasal mucosa, post nasal drainage   Eyes: Conjunctivae and EOM are normal. Pupils are equal, round, and reactive to light.   Neck: Normal range of motion. Neck supple. No thyromegaly present. "   Cardiovascular: Normal rate, regular rhythm and normal heart sounds.    Pulmonary/Chest: Effort normal and breath sounds normal.   Abdominal: Soft. Bowel sounds are normal. There is no tenderness.   Musculoskeletal: Normal range of motion. She exhibits no tenderness or deformity.   Lymphadenopathy:     She has no cervical adenopathy.   Neurological: She is alert and oriented to person, place, and time. She displays normal reflexes. No cranial nerve deficit. She exhibits normal muscle tone. Coordination normal.   Skin: Skin is warm and dry.   Psychiatric: She has a normal mood and affect. Her behavior is normal.       Assessment:       1. Type 2 diabetes mellitus with complication, without long-term current use of insulin    2. Renal insufficiency    3. Hypertension associated with diabetes    4. Seasonal allergic rhinitis due to pollen    5. MIGUEL ANGEL (stress urinary incontinence, female)    6. Type 2 diabetes mellitus with hyperlipidemia        Plan:       Kelly was seen today for establish care.    Diagnoses and all orders for this visit:    Type 2 diabetes mellitus with complication, without long-term current use of insulin  -     Hemoglobin A1c; Future    Renal insufficiency    Hypertension associated with diabetes  -     glimepiride (AMARYL) 2 MG tablet; Take 1 tablet (2 mg total) by mouth daily with breakfast.    Seasonal allergic rhinitis due to pollen  -     ipratropium (ATROVENT) 42 mcg (0.06 %) nasal spray; 2 sprays by Nasal route 3 (three) times daily.    MIGUEL ANGEL (stress urinary incontinence, female)    Type 2 diabetes mellitus with hyperlipidemia  -     glimepiride (AMARYL) 2 MG tablet; Take 1 tablet (2 mg total) by mouth daily with breakfast.      During this visit, I reviewed the pt's history, medications, allergies, and problem list.

## 2018-07-02 ENCOUNTER — LAB VISIT (OUTPATIENT)
Dept: LAB | Facility: HOSPITAL | Age: 83
End: 2018-07-02
Attending: INTERNAL MEDICINE
Payer: MEDICARE

## 2018-07-02 DIAGNOSIS — I10 ESSENTIAL HYPERTENSION: ICD-10-CM

## 2018-07-02 DIAGNOSIS — I22.2 SUBSEQUENT NON-ST ELEVATION (NSTEMI) MYOCARDIAL INFARCTION: ICD-10-CM

## 2018-07-02 DIAGNOSIS — Z79.02 LONG TERM (CURRENT) USE OF ANTITHROMBOTICS/ANTIPLATELETS: ICD-10-CM

## 2018-07-02 DIAGNOSIS — I25.5 ISCHEMIC CARDIOMYOPATHY: ICD-10-CM

## 2018-07-02 DIAGNOSIS — E78.00 HYPERCHOLESTEROLEMIA: ICD-10-CM

## 2018-07-02 LAB
ALBUMIN SERPL BCP-MCNC: 3.5 G/DL
ALP SERPL-CCNC: 84 U/L
ALT SERPL W/O P-5'-P-CCNC: 15 U/L
ANION GAP SERPL CALC-SCNC: 7 MMOL/L
AST SERPL-CCNC: 23 U/L
BILIRUB SERPL-MCNC: 0.4 MG/DL
BUN SERPL-MCNC: 24 MG/DL
CALCIUM SERPL-MCNC: 9.8 MG/DL
CHLORIDE SERPL-SCNC: 103 MMOL/L
CHOLEST SERPL-MCNC: 122 MG/DL
CHOLEST/HDLC SERPL: 3.9 {RATIO}
CO2 SERPL-SCNC: 29 MMOL/L
CREAT SERPL-MCNC: 1.4 MG/DL
EST. GFR  (AFRICAN AMERICAN): 40.6 ML/MIN/1.73 M^2
EST. GFR  (NON AFRICAN AMERICAN): 35.3 ML/MIN/1.73 M^2
GLUCOSE SERPL-MCNC: 110 MG/DL
HDLC SERPL-MCNC: 31 MG/DL
HDLC SERPL: 25.4 %
HGB BLD-MCNC: 11.5 G/DL
LDLC SERPL CALC-MCNC: 73.4 MG/DL
NONHDLC SERPL-MCNC: 91 MG/DL
POTASSIUM SERPL-SCNC: 5.7 MMOL/L
PROT SERPL-MCNC: 7.3 G/DL
SODIUM SERPL-SCNC: 139 MMOL/L
TRIGL SERPL-MCNC: 88 MG/DL

## 2018-07-02 PROCEDURE — 36415 COLL VENOUS BLD VENIPUNCTURE: CPT | Mod: PO

## 2018-07-02 PROCEDURE — 80053 COMPREHEN METABOLIC PANEL: CPT

## 2018-07-02 PROCEDURE — 85018 HEMOGLOBIN: CPT

## 2018-07-02 PROCEDURE — 80061 LIPID PANEL: CPT

## 2018-07-03 ENCOUNTER — TELEPHONE (OUTPATIENT)
Dept: CARDIOLOGY | Facility: CLINIC | Age: 83
End: 2018-07-03

## 2018-07-03 NOTE — TELEPHONE ENCOUNTER
Spoke with pt & advised to decrease potassium intake and dc any supplements as per Dr. Galvez. Pt verbalized understanding.

## 2018-07-03 NOTE — TELEPHONE ENCOUNTER
----- Message from Shayla Galvez MD sent at 7/3/2018 12:04 PM CDT -----  Please call with labs, decrease potassium, dc any supplement

## 2018-07-09 ENCOUNTER — OFFICE VISIT (OUTPATIENT)
Dept: CARDIOLOGY | Facility: CLINIC | Age: 83
End: 2018-07-09
Payer: MEDICARE

## 2018-07-09 VITALS
HEIGHT: 59 IN | HEART RATE: 72 BPM | DIASTOLIC BLOOD PRESSURE: 58 MMHG | BODY MASS INDEX: 29.65 KG/M2 | SYSTOLIC BLOOD PRESSURE: 112 MMHG | WEIGHT: 147.06 LBS

## 2018-07-09 DIAGNOSIS — I25.5 ISCHEMIC CARDIOMYOPATHY: ICD-10-CM

## 2018-07-09 DIAGNOSIS — E87.5 HYPERKALEMIA: Primary | ICD-10-CM

## 2018-07-09 DIAGNOSIS — E78.00 HYPERCHOLESTEROLEMIA: ICD-10-CM

## 2018-07-09 DIAGNOSIS — I10 ESSENTIAL HYPERTENSION: ICD-10-CM

## 2018-07-09 DIAGNOSIS — Z79.02 LONG TERM (CURRENT) USE OF ANTITHROMBOTICS/ANTIPLATELETS: ICD-10-CM

## 2018-07-09 PROCEDURE — 3078F DIAST BP <80 MM HG: CPT | Mod: S$GLB,,, | Performed by: INTERNAL MEDICINE

## 2018-07-09 PROCEDURE — 3074F SYST BP LT 130 MM HG: CPT | Mod: S$GLB,,, | Performed by: INTERNAL MEDICINE

## 2018-07-09 PROCEDURE — 99999 PR PBB SHADOW E&M-EST. PATIENT-LVL IV: CPT | Mod: PBBFAC,,, | Performed by: INTERNAL MEDICINE

## 2018-07-09 PROCEDURE — 99214 OFFICE O/P EST MOD 30 MIN: CPT | Mod: S$GLB,,, | Performed by: INTERNAL MEDICINE

## 2018-07-09 RX ORDER — NITROGLYCERIN 0.4 MG/1
0.4 TABLET SUBLINGUAL EVERY 5 MIN PRN
Qty: 25 TABLET | Refills: 0 | Status: SHIPPED | OUTPATIENT
Start: 2018-07-09 | End: 2018-09-03 | Stop reason: SDUPTHER

## 2018-07-09 NOTE — PROGRESS NOTES
Subjective:    Patient ID:  Dolores B LeJeune is a 82 y.o. female who presents for Hypertension and Coronary Artery Disease        HPI  DISCUSSED LABS AND GOALS, LDL 73, HAS BEEN EATING MORE, K 5.7, EATING A LOT OF BANANA, HB 11.5 UP, DOING WELL IN REHAB,PCP CHANGED [facility] TO [place],  SEE ROS    Past Medical History:   Diagnosis Date    Acute MI     Anticoagulant long-term use     Carpal tunnel syndrome     Cataracts, bilateral     Colon polyps     Coronary artery disease     DM type 2 (diabetes mellitus, type 2)     HTN (hypertension)     Hyperlipidemia     Osteoarthritis     Peripheral neuropathy      Past Surgical History:   Procedure Laterality Date    CAROTID STENT      1    COLONOSCOPY  1/8/2008  [facility]    A 3 mm by 6 mm polyp in the cecum.  FRAGMENTS OF TUBULAR ADENOMA.    A 1 mm polyp in the recto-sigmoid colon.  HYPERPLASTIC POLYP.    Extremely redundant colon.      CORONARY ARTERY BYPASS GRAFT  01/24/2018    [facility], Dr. Echols. LIMA to LAD, SVG to OM1, SVG to OM2, SVG to PDA    EYE SURGERY      eye surgery    HYSTERECTOMY      ovaries remain    ptyregium      TONSILLECTOMY       Family History   Problem Relation Age of Onset    Tremor Mother     Hypertension Mother     Heart disease Mother     Stroke Father      Social History     Social History    Marital status:      Spouse name: N/A    Number of children: 4    Years of education: N/A     Social History Main Topics    Smoking status: Former Smoker     Quit date: 3/15/1972    Smokeless tobacco: Never Used    Alcohol use No    Drug use: No    Sexual activity: Not on file     Other Topics Concern    Not on file     Social History Narrative    No narrative on file       Review of patient's allergies indicates:   Allergen Reactions    Albuterol Other (See Comments)     Palpitations/tremor      Sulfa (sulfonamide antibiotics) Rash       Current Outpatient Prescriptions:     ACCU-CHEK SOFTCLIX LANCETS Misc, USE ONE  AS DIRECTED TWICE DAILY, Disp: 200 each, Rfl: 10    aspirin (ECOTRIN) 81 MG EC tablet, Take 81 mg by mouth once daily., Disp: , Rfl:     atorvastatin (LIPITOR) 40 MG tablet, Take 1 tablet (40 mg total) by mouth once daily., Disp: 90 tablet, Rfl: 1    CALCIUM CARBONATE/VITAMIN D3 (CALCIUM 500 WITH D ORAL), Take by mouth.  , Disp: , Rfl:     clopidogrel (PLAVIX) 75 mg tablet, Take 1 tablet (75 mg total) by mouth once daily., Disp: 90 tablet, Rfl: 1    DEXTRAN 70/HYPROMELLOSE (ARTIFICIAL TEARS OPHT), Apply to eye., Disp: , Rfl:     gabapentin (NEURONTIN) 300 MG capsule, TAKE ONE CAPSULE BY MOUTH DAILY, Disp: 90 capsule, Rfl: 0    glimepiride (AMARYL) 2 MG tablet, Take 1 tablet (2 mg total) by mouth daily with breakfast., Disp: 90 tablet, Rfl: 3    ipratropium (ATROVENT) 42 mcg (0.06 %) nasal spray, 2 sprays by Nasal route 3 (three) times daily., Disp: 15 mL, Rfl: 5    losartan (COZAAR) 25 MG tablet, Take 25 mg by mouth once daily. , Disp: , Rfl:     meclizine (ANTIVERT) 25 mg tablet, Take 1 tablet by mouth Three times daily as needed., Disp: , Rfl:     metFORMIN (GLUCOPHAGE) 500 MG tablet, TAKE ONE-HALF TABLET BY MOUTH TWICE DAILY WITH MEALS., Disp: 90 tablet, Rfl: 0    metoprolol succinate (TOPROL-XL) 25 MG 24 hr tablet, Take 1 tablet (25 mg total) by mouth once daily., Disp: 90 tablet, Rfl: 1    ONETOUCH DELICA LANCETS 33 gauge Misc, , Disp: , Rfl:     oxybutynin (DITROPAN-XL) 5 MG TR24, Take 1 tablet (5 mg total) by mouth once daily., Disp: 30 tablet, Rfl: 11    pantoprazole (PROTONIX) 40 MG tablet, Take 1 tablet (40 mg total) by mouth once daily., Disp: 30 tablet, Rfl: 11    RUTIN/HESP/BIOFLAV/C/[id number] (BIOFLEX ORAL), Take 1 tablet by mouth once daily at 6am., Disp: , Rfl:     mupirocin (BACTROBAN) 2 % ointment, Apply topically 3 (three) times daily., Disp: 22 g, Rfl: 0    Review of Systems   Constitution: Negative for chills, diaphoresis, fever, weakness, malaise/fatigue and night sweats.  "  HENT: Negative for congestion, hearing loss and nosebleeds.    Eyes: Negative for blurred vision, double vision and visual disturbance.   Cardiovascular: Negative for chest pain (MINIMAL INCISIONAL,), claudication, cyanosis, dyspnea on exertion, irregular heartbeat, leg swelling (LLE, OCC), near-syncope, orthopnea, palpitations, paroxysmal nocturnal dyspnea and syncope.   Respiratory: Negative for cough, hemoptysis, shortness of breath and wheezing.    Endocrine: Negative for cold intolerance, heat intolerance and polyuria.   Hematologic/Lymphatic: Negative for adenopathy. Does not bruise/bleed easily.   Skin: Negative for itching and rash.   Musculoskeletal: Negative for back pain, falls and joint pain.   Gastrointestinal: Negative for abdominal pain, change in bowel habit, heartburn, hematemesis, jaundice and melena.   Genitourinary: Negative for dysuria, flank pain and frequency.   Neurological: Negative for brief paralysis, focal weakness, light-headedness, loss of balance, numbness, paresthesias, seizures, sensory change and tremors.   Psychiatric/Behavioral: Negative for altered mental status, depression and memory loss. The patient is not nervous/anxious.    Allergic/Immunologic: Negative for hives and persistent infections.        Objective:      Vitals:    07/09/18 1415   BP: (!) 112/58   Pulse: 72   Weight: 66.7 kg (147 lb 0.8 oz)   Height: 4' 11" (1.499 m)   PainSc: 0-No pain     Body mass index is 29.7 kg/m².    Physical Exam   Constitutional: She is oriented to person, place, and time. She appears well-developed and well-nourished. She is active.   HENT:   Head: Normocephalic and atraumatic.   Mouth/Throat: Oropharynx is clear and moist and mucous membranes are normal.   Eyes: Conjunctivae and EOM are normal. Pupils are equal, round, and reactive to light.   Neck: Trachea normal and normal range of motion. Neck supple. Normal carotid pulses, no hepatojugular reflux and no JVD present. Carotid bruit is " not present. No tracheal deviation, no edema and no erythema present. No thyromegaly present.   Cardiovascular: Normal rate, regular rhythm and normal heart sounds.   No extrasystoles are present. PMI is not displaced.  Exam reveals no gallop and no friction rub.    No murmur heard.  Pulses:       Carotid pulses are 2+ on the right side, and 2+ on the left side.       Radial pulses are 2+ on the right side, and 2+ on the left side.        Femoral pulses are 2+ on the right side, and 2+ on the left side.       Popliteal pulses are 2+ on the right side, and 2+ on the left side.        Dorsalis pedis pulses are 2+ on the right side, and 2+ on the left side.        Posterior tibial pulses are 2+ on the right side, and 2+ on the left side.   Pulmonary/Chest: Effort normal and breath sounds normal. No tachypnea and no bradypnea. She has no wheezes. She has no rales. She exhibits no tenderness.   Abdominal: Soft. Bowel sounds are normal. She exhibits no distension. There is no hepatosplenomegaly. There is no tenderness. There is no CVA tenderness.   Musculoskeletal: Normal range of motion. She exhibits no edema or tenderness.   TRACE TO MILD EDEMA LLE, VARICOSE VEINS   Lymphadenopathy:     She has no cervical adenopathy.   Neurological: She is alert and oriented to person, place, and time. She has normal strength. She displays no tremor. No cranial nerve deficit (MILDLY [place]).   Skin: Skin is warm and dry. No rash noted. No cyanosis or erythema. No pallor.   Psychiatric: She has a normal mood and affect. Her speech is normal and behavior is normal.               ..    Chemistry        Component Value Date/Time     07/02/2018 0816    K 5.7 (H) 07/02/2018 0816     07/02/2018 0816    CO2 29 07/02/2018 0816    BUN 24 (H) 07/02/2018 0816    CREATININE 1.4 07/02/2018 0816     07/02/2018 0816        Component Value Date/Time    CALCIUM 9.8 07/02/2018 0816    ALKPHOS 84 07/02/2018 0816    AST 23 07/02/2018 0816     ALT 15 07/02/2018 0816    BILITOT 0.4 07/02/2018 0816    ESTGFRAFRICA 40.6 (A) 07/02/2018 0816    EGFRNONAA 35.3 (A) 07/02/2018 0816            ..  Lab Results   Component Value Date    CHOL 122 07/02/2018    CHOL 89 (L) 01/23/2018    CHOL 127 01/02/2018     Lab Results   Component Value Date    HDL 31 (L) 07/02/2018    HDL 29 (L) 01/23/2018    HDL 34 (L) 01/02/2018     Lab Results   Component Value Date    LDLCALC 73.4 07/02/2018    LDLCALC 38.8 (L) 01/23/2018    LDLCALC 62.4 (L) 01/02/2018     Lab Results   Component Value Date    TRIG 88 07/02/2018    TRIG 106 01/23/2018    TRIG 153 (H) 01/02/2018     Lab Results   Component Value Date    CHOLHDL 25.4 07/02/2018    CHOLHDL 32.6 01/23/2018    CHOLHDL 26.8 01/02/2018     ..  Lab Results   Component Value Date    WBC 11.93 02/28/2018    HGB 11.5 (L) 07/02/2018    HCT 31.8 (L) 02/28/2018    MCV 91 02/28/2018     02/28/2018       Test(s) Reviewed  I have reviewed the following in detail:  [] Stress test   [] Angiography   [] Echocardiogram   [x] Labs   [] Other:       Assessment:         ICD-10-CM ICD-9-CM   1. Hyperkalemia E87.5 276.7   2. Ischemic cardiomyopathy I25.5 414.8   3. Long term (current) use of antithrombotics/antiplatelets Z79.02 V58.63   4. Essential hypertension I10 401.9   5. Hypercholesterolemia E78.00 272.0     Problem List Items Addressed This Visit        Cardiac/Vascular    Hypercholesterolemia    HTN (hypertension)    Ischemic cardiomyopathy       Renal/    Hyperkalemia - Primary    Relevant Orders    POTASSIUM       Hematology    Long term (current) use of antithrombotics/antiplatelets           Plan:     DECREASE K INTAKE, EXPLAINED, RE-CHECK, PRN SL NTG, , ALL OTHER  CV CLINICALLY STABLE, NO ANGINA, NO HF, NO TIA, NO CLINICAL ARRHYTHMIA,CONTINUE CURRENT MEDS, EDUCATION, DIET, EXERCISE, RTC IN 4 MO WITH BMP, EXPLAINED PLAN TO PT/       Hyperkalemia  -     POTASSIUM; Future; Expected date: 07/23/2018    Ischemic  cardiomyopathy    Long term (current) use of antithrombotics/antiplatelets    Essential hypertension    Hypercholesterolemia    RTC Low level/low impact aerobic exercise 5x's/wk. Heart healthy diet and risk factor modification.    See labs and med orders.    Aerobic exercise 5x's/wk. Heart healthy diet and risk factor modification.    See labs and med orders.

## 2018-07-23 ENCOUNTER — LAB VISIT (OUTPATIENT)
Dept: LAB | Facility: HOSPITAL | Age: 83
End: 2018-07-23
Attending: INTERNAL MEDICINE
Payer: MEDICARE

## 2018-07-23 DIAGNOSIS — E87.5 HYPERKALEMIA: ICD-10-CM

## 2018-07-23 DIAGNOSIS — I25.5 ISCHEMIC CARDIOMYOPATHY: ICD-10-CM

## 2018-07-23 DIAGNOSIS — Z79.02 LONG TERM (CURRENT) USE OF ANTITHROMBOTICS/ANTIPLATELETS: ICD-10-CM

## 2018-07-23 DIAGNOSIS — I10 ESSENTIAL HYPERTENSION: ICD-10-CM

## 2018-07-23 LAB
ANION GAP SERPL CALC-SCNC: 9 MMOL/L
BUN SERPL-MCNC: 23 MG/DL
CALCIUM SERPL-MCNC: 9.5 MG/DL
CHLORIDE SERPL-SCNC: 105 MMOL/L
CO2 SERPL-SCNC: 28 MMOL/L
CREAT SERPL-MCNC: 1.4 MG/DL
EST. GFR  (AFRICAN AMERICAN): 40.4 ML/MIN/1.73 M^2
EST. GFR  (NON AFRICAN AMERICAN): 35 ML/MIN/1.73 M^2
GLUCOSE SERPL-MCNC: 98 MG/DL
POTASSIUM SERPL-SCNC: 4.7 MMOL/L
POTASSIUM SERPL-SCNC: 4.7 MMOL/L
SODIUM SERPL-SCNC: 142 MMOL/L

## 2018-07-23 PROCEDURE — 80048 BASIC METABOLIC PNL TOTAL CA: CPT

## 2018-07-23 PROCEDURE — 36415 COLL VENOUS BLD VENIPUNCTURE: CPT | Mod: PO

## 2018-08-08 DIAGNOSIS — E78.5 TYPE 2 DIABETES MELLITUS WITH HYPERLIPIDEMIA: ICD-10-CM

## 2018-08-08 DIAGNOSIS — E11.69 TYPE 2 DIABETES MELLITUS WITH HYPERLIPIDEMIA: ICD-10-CM

## 2018-08-08 DIAGNOSIS — E11.59 HYPERTENSION ASSOCIATED WITH DIABETES: ICD-10-CM

## 2018-08-08 DIAGNOSIS — I15.2 HYPERTENSION ASSOCIATED WITH DIABETES: ICD-10-CM

## 2018-08-08 RX ORDER — GABAPENTIN 300 MG/1
300 CAPSULE ORAL DAILY
Qty: 90 CAPSULE | Refills: 0 | Status: SHIPPED | OUTPATIENT
Start: 2018-08-08 | End: 2018-09-03 | Stop reason: SDUPTHER

## 2018-08-08 RX ORDER — METFORMIN HYDROCHLORIDE 500 MG/1
TABLET ORAL
Qty: 90 TABLET | Refills: 0 | Status: SHIPPED | OUTPATIENT
Start: 2018-08-08 | End: 2018-09-03 | Stop reason: SDUPTHER

## 2018-09-03 DIAGNOSIS — E11.59 HYPERTENSION ASSOCIATED WITH DIABETES: ICD-10-CM

## 2018-09-03 DIAGNOSIS — I15.2 HYPERTENSION ASSOCIATED WITH DIABETES: ICD-10-CM

## 2018-09-03 DIAGNOSIS — E11.69 TYPE 2 DIABETES MELLITUS WITH HYPERLIPIDEMIA: ICD-10-CM

## 2018-09-03 DIAGNOSIS — E78.5 TYPE 2 DIABETES MELLITUS WITH HYPERLIPIDEMIA: ICD-10-CM

## 2018-09-04 RX ORDER — METFORMIN HYDROCHLORIDE 500 MG/1
TABLET ORAL
Qty: 90 TABLET | Refills: 0 | Status: SHIPPED | OUTPATIENT
Start: 2018-09-04 | End: 2019-02-01 | Stop reason: SDUPTHER

## 2018-09-04 RX ORDER — GABAPENTIN 300 MG/1
CAPSULE ORAL
Qty: 90 CAPSULE | Refills: 0 | Status: SHIPPED | OUTPATIENT
Start: 2018-09-04 | End: 2019-02-01 | Stop reason: SDUPTHER

## 2018-09-05 RX ORDER — NITROGLYCERIN 0.4 MG/1
TABLET SUBLINGUAL
Qty: 25 TABLET | Refills: 0 | Status: SHIPPED | OUTPATIENT
Start: 2018-09-05 | End: 2022-03-03

## 2018-09-10 DIAGNOSIS — I15.2 HYPERTENSION ASSOCIATED WITH DIABETES: ICD-10-CM

## 2018-09-10 DIAGNOSIS — E11.59 HYPERTENSION ASSOCIATED WITH DIABETES: ICD-10-CM

## 2018-09-10 DIAGNOSIS — E11.69 TYPE 2 DIABETES MELLITUS WITH HYPERLIPIDEMIA: ICD-10-CM

## 2018-09-10 DIAGNOSIS — E78.5 TYPE 2 DIABETES MELLITUS WITH HYPERLIPIDEMIA: ICD-10-CM

## 2018-10-27 RX ORDER — CLOPIDOGREL BISULFATE 75 MG/1
TABLET ORAL
Qty: 90 TABLET | Refills: 0 | Status: SHIPPED | OUTPATIENT
Start: 2018-10-27 | End: 2018-11-12 | Stop reason: SDUPTHER

## 2018-11-12 ENCOUNTER — LAB VISIT (OUTPATIENT)
Dept: LAB | Facility: HOSPITAL | Age: 83
End: 2018-11-12
Attending: INTERNAL MEDICINE
Payer: MEDICARE

## 2018-11-12 ENCOUNTER — OFFICE VISIT (OUTPATIENT)
Dept: CARDIOLOGY | Facility: CLINIC | Age: 83
End: 2018-11-12
Payer: MEDICARE

## 2018-11-12 VITALS
SYSTOLIC BLOOD PRESSURE: 129 MMHG | DIASTOLIC BLOOD PRESSURE: 75 MMHG | BODY MASS INDEX: 29.29 KG/M2 | HEIGHT: 59 IN | WEIGHT: 145.31 LBS | HEART RATE: 88 BPM

## 2018-11-12 DIAGNOSIS — E78.00 HYPERCHOLESTEROLEMIA: ICD-10-CM

## 2018-11-12 DIAGNOSIS — I25.5 ISCHEMIC CARDIOMYOPATHY: ICD-10-CM

## 2018-11-12 DIAGNOSIS — I10 ESSENTIAL HYPERTENSION: ICD-10-CM

## 2018-11-12 DIAGNOSIS — I51.89 DIASTOLIC DYSFUNCTION: ICD-10-CM

## 2018-11-12 DIAGNOSIS — Z79.02 LONG TERM (CURRENT) USE OF ANTITHROMBOTICS/ANTIPLATELETS: ICD-10-CM

## 2018-11-12 DIAGNOSIS — I25.5 ISCHEMIC CARDIOMYOPATHY: Primary | ICD-10-CM

## 2018-11-12 LAB
ALBUMIN SERPL BCP-MCNC: 3.4 G/DL
ALP SERPL-CCNC: 72 U/L
ALT SERPL W/O P-5'-P-CCNC: 17 U/L
ANION GAP SERPL CALC-SCNC: 9 MMOL/L
AST SERPL-CCNC: 21 U/L
BILIRUB SERPL-MCNC: 0.4 MG/DL
BUN SERPL-MCNC: 19 MG/DL
CALCIUM SERPL-MCNC: 9.3 MG/DL
CHLORIDE SERPL-SCNC: 103 MMOL/L
CO2 SERPL-SCNC: 29 MMOL/L
CREAT SERPL-MCNC: 1.1 MG/DL
EST. GFR  (AFRICAN AMERICAN): 54 ML/MIN/1.73 M^2
EST. GFR  (NON AFRICAN AMERICAN): 46.9 ML/MIN/1.73 M^2
GLUCOSE SERPL-MCNC: 117 MG/DL
HGB BLD-MCNC: 11.1 G/DL
POTASSIUM SERPL-SCNC: 4.2 MMOL/L
PROT SERPL-MCNC: 7.4 G/DL
SODIUM SERPL-SCNC: 141 MMOL/L

## 2018-11-12 PROCEDURE — 3078F DIAST BP <80 MM HG: CPT | Mod: S$GLB,,, | Performed by: INTERNAL MEDICINE

## 2018-11-12 PROCEDURE — 1101F PT FALLS ASSESS-DOCD LE1/YR: CPT | Mod: S$GLB,,, | Performed by: INTERNAL MEDICINE

## 2018-11-12 PROCEDURE — 99999 PR PBB SHADOW E&M-EST. PATIENT-LVL IV: CPT | Mod: PBBFAC,,, | Performed by: INTERNAL MEDICINE

## 2018-11-12 PROCEDURE — 36415 COLL VENOUS BLD VENIPUNCTURE: CPT | Mod: PO

## 2018-11-12 PROCEDURE — 3074F SYST BP LT 130 MM HG: CPT | Mod: S$GLB,,, | Performed by: INTERNAL MEDICINE

## 2018-11-12 PROCEDURE — 80053 COMPREHEN METABOLIC PANEL: CPT

## 2018-11-12 PROCEDURE — 85018 HEMOGLOBIN: CPT

## 2018-11-12 PROCEDURE — 99214 OFFICE O/P EST MOD 30 MIN: CPT | Mod: S$GLB,,, | Performed by: INTERNAL MEDICINE

## 2018-11-12 RX ORDER — CLOPIDOGREL BISULFATE 75 MG/1
75 TABLET ORAL DAILY
Qty: 90 TABLET | Refills: 1 | Status: SHIPPED | OUTPATIENT
Start: 2018-11-12 | End: 2019-05-13 | Stop reason: SDUPTHER

## 2018-11-12 NOTE — PROGRESS NOTES
Subjective:    Patient ID:  Dolores B LeJeune is a 83 y.o. female who presents for Coronary Artery Disease; Hypertension; and Hyperlipidemia        HPI  NO LABS, DOING WELL, NO CP, NO SOB, SEE ROS    Past Medical History:   Diagnosis Date    Acute MI     Anticoagulant long-term use     Carpal tunnel syndrome     Cataracts, bilateral     Colon polyps     Coronary artery disease     DM type 2 (diabetes mellitus, type 2)     HTN (hypertension)     Hyperlipidemia     Osteoarthritis     Peripheral neuropathy      Past Surgical History:   Procedure Laterality Date    AORTOCORONARY BYPASS-CABG X 4 VESSELS w/ EVH TO RIGHT LEG N/A 1/24/2018    Performed by [facility] at [facility] OR    CAROTID STENT      1    COLONOSCOPY  1/8/2008  [facility]    A 3 mm by 6 mm polyp in the cecum.  FRAGMENTS OF TUBULAR ADENOMA.    A 1 mm polyp in the recto-sigmoid colon.  HYPERPLASTIC POLYP.    Extremely redundant colon.      COLONOSCOPY N/A 1/18/2013    [facility] ENDO    CORONARY ARTERY BYPASS GRAFT  01/24/2018    [facility], Dr. Echols. LIMA to LAD, SVG to OM1, SVG to OM2, SVG to PDA    EYE SURGERY      eye surgery    HARVEST-VEIN-ENDOVASCULAR Right 1/24/2018    Performed [facility]os [facility] at [facility] OR    HYSTERECTOMY      ovaries remain    Left heart cath Right 1/23/2018    Performed by Shayla Galvez MD at [facility] CATH    ptyregium      TONSILLECTOMY       Family History   Problem Relation Age of Onset    Tremor Mother     Hypertension Mother     Heart disease Mother     Stroke Father      Social History     Socioeconomic History    Marital status:      Spouse name: Not on file    Number of children: 4    Years of education: Not on file    Highest education level: Not on file   Social Needs    Financial resource strain: Not on file    Food insecurity - worry: Not on file    Food insecurity - inability: Not on file    Transportation needs - medical: Not on file     Transportation needs - non-medical: Not on file   Occupational History    Not on file   Tobacco Use    Smoking status: Former Smoker     Last attempt to quit: 3/15/1972     Years since quittin.6    Smokeless tobacco: Never Used   Substance and Sexual Activity    Alcohol use: No    Drug use: No    Sexual activity: Not on file   Other Topics Concern    Not on file   Social History Narrative    Not on file       Review of patient's allergies indicates:   Allergen Reactions    Albuterol Other (See Comments)     Palpitations/tremor      Sulfa (sulfonamide antibiotics) Rash       Current Outpatient Medications:     ACCU-CHEK SOFTCLIX LANCETS Misc, USE ONE AS DIRECTED TWICE DAILY, Disp: 200 each, Rfl: 10    aspirin (ECOTRIN) 81 MG EC tablet, Take 81 mg by mouth once daily., Disp: , Rfl:     atorvastatin (LIPITOR) 40 MG tablet, Take 1 tablet (40 mg total) by mouth once daily., Disp: 90 tablet, Rfl: 1    CALCIUM CARBONATE/VITAMIN D3 (CALCIUM 500 WITH D ORAL), Take by mouth.  , Disp: , Rfl:     clopidogrel (PLAVIX) 75 mg tablet, TAKE ONE TABLET BY MOUTH DAILY, Disp: 90 tablet, Rfl: 0    DEXTRAN 70/HYPROMELLOSE (ARTIFICIAL TEARS OPHT), Apply to eye., Disp: , Rfl:     gabapentin (NEURONTIN) 300 MG capsule, TAKE  ONE CAPSULE BY MOUTH DAILY, Disp: 90 capsule, Rfl: 0    glimepiride (AMARYL) 2 MG tablet, Take 1 tablet (2 mg total) by mouth daily with breakfast., Disp: 90 tablet, Rfl: 3    ipratropium (ATROVENT) 42 mcg (0.06 %) nasal spray, 2 sprays by Nasal route 3 (three) times daily., Disp: 15 mL, Rfl: 5    losartan (COZAAR) 25 MG tablet, Take 25 mg by mouth once daily. , Disp: , Rfl:     meclizine (ANTIVERT) 25 mg tablet, Take 1 tablet by mouth Three times daily as needed., Disp: , Rfl:     metFORMIN (GLUCOPHAGE) 500 MG tablet, TAKE ONE-HALF TABLET BY MOUTH TWICE DAILY WITH MEALS., Disp: 90 tablet, Rfl: 0    metoprolol succinate (TOPROL-XL) 25 MG 24 hr tablet, Take 1 tablet (25 mg total) by mouth  once daily., Disp: 90 tablet, Rfl: 1    mupirocin (BACTROBAN) 2 % ointment, Apply topically 3 (three) times daily., Disp: 22 g, Rfl: 0    nitroGLYCERIN (NITROSTAT) 0.4 MG SL tablet, DISSOLVE ONE TABLET UNDER TONGUE EVERY 3 TO 5 MINUTES FOR CHEST PAIN. IF NO RELIEF AFTER 3 DOSES SEEK EMERGENCY COURTNEY, Disp: 25 tablet, Rfl: 0    ONETOUCH DELICA LANCETS 33 gauge Misc, , Disp: , Rfl:     oxybutynin (DITROPAN-XL) 5 MG TR24, Take 1 tablet (5 mg total) by mouth once daily., Disp: 30 tablet, Rfl: 11    pantoprazole (PROTONIX) 40 MG tablet, Take 1 tablet (40 mg total) by mouth once daily., Disp: 30 tablet, Rfl: 11    RUTIN/HESP/BIOFLAV/C/[id number] (BIOFLEX ORAL), Take 1 tablet by mouth once daily at 6am., Disp: , Rfl:     Review of Systems   Constitution: Negative for chills, diaphoresis, fever, weakness, malaise/fatigue and night sweats.   HENT: Negative for congestion, hearing loss and nosebleeds.    Eyes: Negative for blurred vision, double vision and visual disturbance.   Cardiovascular: Negative for chest pain (MINIMAL INCISIONAL,), claudication, cyanosis, dyspnea on exertion, irregular heartbeat, leg swelling (LLE, OCC), near-syncope, orthopnea, palpitations, paroxysmal nocturnal dyspnea and syncope.   Respiratory: Negative for cough, hemoptysis, shortness of breath and wheezing.    Endocrine: Negative for cold intolerance, heat intolerance and polyuria.   Hematologic/Lymphatic: Negative for adenopathy. Does not bruise/bleed easily.   Skin: Negative for itching and rash.   Musculoskeletal: Negative for back pain, falls and joint pain.   Gastrointestinal: Negative for abdominal pain, change in bowel habit, heartburn, hematemesis, jaundice and melena.   Genitourinary: Negative for dysuria, flank pain and frequency.   Neurological: Negative for brief paralysis, focal weakness, light-headedness, loss of balance, numbness, paresthesias, sensory change and tremors.   Psychiatric/Behavioral: Negative for altered mental  "status, depression and memory loss. The patient is not nervous/anxious.    Allergic/Immunologic: Negative for hives and persistent infections.        Objective:      Vitals:    11/12/18 1255   BP: 129/75   Pulse: 88   Weight: 65.9 kg (145 lb 4.5 oz)   Height: 4' 11" (1.499 m)   PainSc: 0-No pain     Body mass index is 29.34 kg/m².    Physical Exam   Constitutional: She is oriented to person, place, and time. She appears well-developed and well-nourished. She is active.   HENT:   Head: Normocephalic and atraumatic.   Mouth/Throat: Oropharynx is clear and moist and mucous membranes are normal.   Eyes: Conjunctivae and EOM are normal. Pupils are equal, round, and reactive to light.   Neck: Trachea normal and normal range of motion. Neck supple. Normal carotid pulses, no hepatojugular reflux and no JVD present. Carotid bruit is not present. No tracheal deviation, no edema and no erythema present. No thyromegaly present.   Cardiovascular: Normal rate, regular rhythm and normal heart sounds.  No extrasystoles are present. PMI is not displaced. Exam reveals no gallop and no friction rub.   No murmur heard.  Pulses:       Carotid pulses are 2+ on the right side, and 2+ on the left side.       Radial pulses are 2+ on the right side, and 2+ on the left side.        Femoral pulses are 2+ on the right side, and 2+ on the left side.       Popliteal pulses are 2+ on the right side, and 2+ on the left side.        Dorsalis pedis pulses are 2+ on the right side, and 2+ on the left side.        Posterior tibial pulses are 2+ on the right side, and 2+ on the left side.   Pulmonary/Chest: Effort normal and breath sounds normal. No tachypnea and no bradypnea. She has no wheezes. She has no rales. She exhibits no tenderness.   Abdominal: Soft. Bowel sounds are normal. She exhibits no distension. There is no hepatosplenomegaly. There is no tenderness. There is no CVA tenderness.   Musculoskeletal: Normal range of motion. She exhibits no " edema or tenderness.   TRACE TO MILD EDEMA LLE, VARICOSE VEINS   Lymphadenopathy:     She has no cervical adenopathy.   Neurological: She is alert and oriented to person, place, and time. She has normal strength. She displays no tremor. No cranial nerve deficit (MILDLY [place]).   Skin: Skin is warm and dry. No rash noted. No cyanosis or erythema. No pallor.   Psychiatric: She has a normal mood and affect. Her speech is normal and behavior is normal.               ..    Chemistry        Component Value Date/Time     07/23/2018 0915    K 4.7 07/23/2018 0915    K 4.7 07/23/2018 0915     07/23/2018 0915    CO2 28 07/23/2018 0915    BUN 23 07/23/2018 0915    CREATININE 1.4 07/23/2018 0915    GLU 98 07/23/2018 0915        Component Value Date/Time    CALCIUM 9.5 07/23/2018 0915    ALKPHOS 84 07/02/2018 0816    AST 23 07/02/2018 0816    ALT 15 07/02/2018 0816    BILITOT 0.4 07/02/2018 0816    ESTGFRAFRICA 40.4 (A) 07/23/2018 0915    EGFRNONAA 35.0 (A) 07/23/2018 0915            ..  Lab Results   Component Value Date    CHOL 122 07/02/2018    CHOL 89 (L) 01/23/2018    CHOL 127 01/02/2018     Lab Results   Component Value Date    HDL 31 (L) 07/02/2018    HDL 29 (L) 01/23/2018    HDL 34 (L) 01/02/2018     Lab Results   Component Value Date    LDLCALC 73.4 07/02/2018    LDLCALC 38.8 (L) 01/23/2018    LDLCALC 62.4 (L) 01/02/2018     Lab Results   Component Value Date    TRIG 88 07/02/2018    TRIG 106 01/23/2018    TRIG 153 (H) 01/02/2018     Lab Results   Component Value Date    CHOLHDL 25.4 07/02/2018    CHOLHDL 32.6 01/23/2018    CHOLHDL 26.8 01/02/2018     ..  Lab Results   Component Value Date    WBC 11.93 02/28/2018    HGB 11.5 (L) 07/02/2018    HCT 31.8 (L) 02/28/2018    MCV 91 02/28/2018     02/28/2018       Test(s) Reviewed  I have reviewed the following in detail:  [] Stress test   [] Angiography   [] Echocardiogram   [] Labs   [] Other:       Assessment:         ICD-10-CM ICD-9-CM   1. Ischemic  cardiomyopathy I25.5 414.8   2. Essential hypertension I10 401.9   3. Long term (current) use of antithrombotics/antiplatelets Z79.02 V58.63   4. Hypercholesterolemia E78.00 272.0     Problem List Items Addressed This Visit        Cardiac/Vascular    Hypercholesterolemia    Relevant Orders    Comprehensive metabolic panel    HTN (hypertension)    Relevant Orders    Comprehensive metabolic panel    Ischemic cardiomyopathy - Primary    Relevant Orders    Comprehensive metabolic panel       Hematology    Long term (current) use of antithrombotics/antiplatelets    Relevant Orders    Comprehensive metabolic panel    Hemoglobin           Plan:     LABS TODAY, ALL CV CLINICALLY STABLE, NO ANGINA, NO HF, NO TIA, NO CLINICAL ARRHYTHMIA,CONTINUE CURRENT MEDS, EDUCATION, DIET, EXERCISE, RTC IN 6 MO, DISCUSSED PLAN WITH PT AND       Ischemic cardiomyopathy  -     Comprehensive metabolic panel; Future; Expected date: 11/12/2018    Essential hypertension  -     Comprehensive metabolic panel; Future; Expected date: 11/12/2018    Long term (current) use of antithrombotics/antiplatelets  -     Comprehensive metabolic panel; Future; Expected date: 11/12/2018  -     Hemoglobin; Future; Expected date: 11/12/2018    Hypercholesterolemia  -     Comprehensive metabolic panel; Future; Expected date: 11/12/2018    RTC Low level/low impact aerobic exercise 5x's/wk. Heart healthy diet and risk factor modification.    See labs and med orders.    Aerobic exercise 5x's/wk. Heart healthy diet and risk factor modification.    See labs and med orders.

## 2018-11-15 ENCOUNTER — PATIENT MESSAGE (OUTPATIENT)
Dept: ADMINISTRATIVE | Facility: HOSPITAL | Age: 83
End: 2018-11-15

## 2018-11-15 ENCOUNTER — PATIENT OUTREACH (OUTPATIENT)
Dept: ADMINISTRATIVE | Facility: HOSPITAL | Age: 83
End: 2018-11-15

## 2018-11-28 ENCOUNTER — LAB VISIT (OUTPATIENT)
Dept: LAB | Facility: HOSPITAL | Age: 83
End: 2018-11-28
Attending: FAMILY MEDICINE
Payer: MEDICARE

## 2018-11-28 ENCOUNTER — OFFICE VISIT (OUTPATIENT)
Dept: FAMILY MEDICINE | Facility: CLINIC | Age: 83
End: 2018-11-28
Payer: MEDICARE

## 2018-11-28 VITALS
TEMPERATURE: 98 F | SYSTOLIC BLOOD PRESSURE: 110 MMHG | DIASTOLIC BLOOD PRESSURE: 58 MMHG | OXYGEN SATURATION: 94 % | HEART RATE: 85 BPM

## 2018-11-28 DIAGNOSIS — E78.00 HYPERCHOLESTEROLEMIA: ICD-10-CM

## 2018-11-28 DIAGNOSIS — Z12.39 BREAST CANCER SCREENING: ICD-10-CM

## 2018-11-28 DIAGNOSIS — E11.9 CONTROLLED TYPE 2 DIABETES MELLITUS WITHOUT COMPLICATION, WITHOUT LONG-TERM CURRENT USE OF INSULIN: Primary | ICD-10-CM

## 2018-11-28 DIAGNOSIS — Z78.0 POST-MENOPAUSAL: ICD-10-CM

## 2018-11-28 DIAGNOSIS — I25.10 CORONARY ARTERY DISEASE, ANGINA PRESENCE UNSPECIFIED, UNSPECIFIED VESSEL OR LESION TYPE, UNSPECIFIED WHETHER NATIVE OR TRANSPLANTED HEART: ICD-10-CM

## 2018-11-28 DIAGNOSIS — E11.9 CONTROLLED TYPE 2 DIABETES MELLITUS WITHOUT COMPLICATION, WITHOUT LONG-TERM CURRENT USE OF INSULIN: ICD-10-CM

## 2018-11-28 DIAGNOSIS — Z95.1 S/P CABG (CORONARY ARTERY BYPASS GRAFT): ICD-10-CM

## 2018-11-28 DIAGNOSIS — I10 ESSENTIAL HYPERTENSION: ICD-10-CM

## 2018-11-28 DIAGNOSIS — E11.69 TYPE 2 DIABETES MELLITUS WITH HYPERLIPIDEMIA: ICD-10-CM

## 2018-11-28 DIAGNOSIS — E78.5 TYPE 2 DIABETES MELLITUS WITH HYPERLIPIDEMIA: ICD-10-CM

## 2018-11-28 LAB
ESTIMATED AVG GLUCOSE: 131 MG/DL
HBA1C MFR BLD HPLC: 6.2 %

## 2018-11-28 PROCEDURE — 90662 IIV NO PRSV INCREASED AG IM: CPT | Mod: S$GLB,,, | Performed by: FAMILY MEDICINE

## 2018-11-28 PROCEDURE — 3078F DIAST BP <80 MM HG: CPT | Mod: S$GLB,,, | Performed by: FAMILY MEDICINE

## 2018-11-28 PROCEDURE — 1101F PT FALLS ASSESS-DOCD LE1/YR: CPT | Mod: S$GLB,,, | Performed by: FAMILY MEDICINE

## 2018-11-28 PROCEDURE — G0008 ADMIN INFLUENZA VIRUS VAC: HCPCS | Mod: S$GLB,,, | Performed by: FAMILY MEDICINE

## 2018-11-28 PROCEDURE — 99214 OFFICE O/P EST MOD 30 MIN: CPT | Mod: 25,S$GLB,, | Performed by: FAMILY MEDICINE

## 2018-11-28 PROCEDURE — 83036 HEMOGLOBIN GLYCOSYLATED A1C: CPT

## 2018-11-28 PROCEDURE — 3074F SYST BP LT 130 MM HG: CPT | Mod: S$GLB,,, | Performed by: FAMILY MEDICINE

## 2018-11-28 PROCEDURE — 36415 COLL VENOUS BLD VENIPUNCTURE: CPT | Mod: PO

## 2018-11-28 PROCEDURE — 99999 PR PBB SHADOW E&M-EST. PATIENT-LVL III: CPT | Mod: PBBFAC,,, | Performed by: FAMILY MEDICINE

## 2018-11-28 RX ORDER — BLOOD-GLUCOSE METER
EACH MISCELLANEOUS
COMMUNITY
Start: 2018-11-19 | End: 2019-05-29 | Stop reason: SDUPTHER

## 2018-11-28 NOTE — PROGRESS NOTES
Subjective:       Patient ID: Dolores B LeJeune is a 83 y.o. female.    Chief Complaint: Follow-up    Pt is known to me.   Pt is here for followup of chronic medical issues.  The pt is doing well after her CABG earlier this year--she continues to be followed by Dr. Galvez.  She is very active--especially at her [employer] and with her family.  Her blood glucoses are good at home.  Her last HgA1c was 6.0.      Review of Systems   Constitutional: Negative for activity change, appetite change, fatigue and unexpected weight change.   Eyes: Negative for visual disturbance.   Respiratory: Negative for cough, chest tightness and shortness of breath.    Cardiovascular: Negative for chest pain, palpitations and leg swelling.   Gastrointestinal: Negative for abdominal pain, constipation, diarrhea, nausea and vomiting.   Endocrine: Negative for cold intolerance, heat intolerance and polyuria.   Genitourinary: Positive for enuresis (when she holds her urine). Negative for decreased urine volume, dysuria and urgency.   Musculoskeletal: Negative for arthralgias and back pain.   Skin: Negative for rash.   Neurological: Negative for numbness and headaches.       Objective:       Vitals:    11/28/18 1046   BP: (!) 110/58   Pulse: 85   Temp: 98.3 °F (36.8 °C)   TempSrc: Oral   SpO2: (!) 94%     Physical Exam   Constitutional: She is oriented to person, place, and time. She appears well-developed and well-nourished.   HENT:   Head: Normocephalic.   Eyes: Conjunctivae and EOM are normal. Pupils are equal, round, and reactive to light.   Neck: Normal range of motion. Neck supple. No thyromegaly present.   Cardiovascular: Normal rate, regular rhythm and normal heart sounds.   Pulses:       Dorsalis pedis pulses are 2+ on the right side, and 2+ on the left side.        Posterior tibial pulses are 2+ on the right side, and 2+ on the left side.   Pulmonary/Chest: Effort normal and breath sounds normal.   Abdominal: Soft. Bowel sounds are  normal. There is no tenderness.   Musculoskeletal: Normal range of motion. She exhibits no tenderness or deformity.        Right foot: There is normal range of motion and no deformity.        Left foot: There is normal range of motion and no deformity.   Feet:   Right Foot:   Protective Sensation: 7 sites tested. 7 sites sensed.   Skin Integrity: Negative for ulcer.   Left Foot:   Protective Sensation: 7 sites tested. 7 sites sensed.   Skin Integrity: Negative for ulcer.   Lymphadenopathy:     She has no cervical adenopathy.   Neurological: She is alert and oriented to person, place, and time. She displays normal reflexes. No cranial nerve deficit. She exhibits normal muscle tone. Coordination normal.   Skin: Skin is warm and dry.   Psychiatric: She has a normal mood and affect. Her behavior is normal.       Assessment:       1. Controlled type 2 diabetes mellitus without complication, without long-term current use of insulin    2. Breast cancer screening    3. Post-menopausal    4. Type 2 diabetes mellitus with hyperlipidemia    5. Essential hypertension    6. Hypercholesterolemia    7. Coronary artery disease, angina presence unspecified, unspecified vessel or lesion type, unspecified whether native or transplanted heart    8. S/P CABG (coronary artery bypass graft)        Plan:       Kelly was seen today for follow-up.    Diagnoses and all orders for this visit:    Controlled type 2 diabetes mellitus without complication, without long-term current use of insulin  -     Hemoglobin A1c; Future    Breast cancer screening  -     Mammo Digital Screening Bilateral With CAD; Future    Post-menopausal  -     DXA Bone Density Spine And Hip; Future    Type 2 diabetes mellitus with hyperlipidemia    Essential hypertension    Hypercholesterolemia    Coronary artery disease, angina presence unspecified, unspecified vessel or lesion type, unspecified whether native or transplanted heart    S/P CABG (coronary artery bypass  graft)      During this visit, I reviewed the pt's history, medications, allergies, and problem list.

## 2018-12-11 ENCOUNTER — HOSPITAL ENCOUNTER (OUTPATIENT)
Dept: RADIOLOGY | Facility: HOSPITAL | Age: 83
Discharge: HOME | End: 2018-12-11
Attending: FAMILY MEDICINE
Payer: MEDICARE

## 2018-12-11 DIAGNOSIS — Z12.39 BREAST CANCER SCREENING: ICD-10-CM

## 2018-12-11 DIAGNOSIS — Z78.0 POST-MENOPAUSAL: ICD-10-CM

## 2018-12-11 PROCEDURE — 77067 SCR MAMMO BI INCL CAD: CPT | Mod: 26,,, | Performed by: RADIOLOGY

## 2018-12-11 PROCEDURE — 77080 DXA BONE DENSITY AXIAL: CPT | Mod: 26,,, | Performed by: RADIOLOGY

## 2018-12-11 PROCEDURE — 77063 BREAST TOMOSYNTHESIS BI: CPT | Mod: TC,PO

## 2018-12-11 PROCEDURE — 77080 DXA BONE DENSITY AXIAL: CPT | Mod: TC,PO

## 2018-12-11 PROCEDURE — 77063 BREAST TOMOSYNTHESIS BI: CPT | Mod: 26,,, | Performed by: RADIOLOGY

## 2018-12-13 NOTE — PROGRESS NOTES
Lst pt know her bone density shows some bone loss but not osteoporosis.  Continue to get plenty of calcium in diet and take OTC Vit D 2000 iu daily.

## 2018-12-14 ENCOUNTER — TELEPHONE (OUTPATIENT)
Dept: FAMILY MEDICINE | Facility: CLINIC | Age: 83
End: 2018-12-14

## 2018-12-14 NOTE — TELEPHONE ENCOUNTER
----- Message from Yuki Allen sent at 12/13/2018  3:12 PM CST -----  Please call [phone] ... Returning call for results

## 2019-02-01 DIAGNOSIS — E78.5 TYPE 2 DIABETES MELLITUS WITH HYPERLIPIDEMIA: ICD-10-CM

## 2019-02-01 DIAGNOSIS — E11.69 TYPE 2 DIABETES MELLITUS WITH HYPERLIPIDEMIA: ICD-10-CM

## 2019-02-01 DIAGNOSIS — E11.59 HYPERTENSION ASSOCIATED WITH DIABETES: ICD-10-CM

## 2019-02-01 DIAGNOSIS — I15.2 HYPERTENSION ASSOCIATED WITH DIABETES: ICD-10-CM

## 2019-02-01 RX ORDER — GABAPENTIN 300 MG/1
CAPSULE ORAL
Qty: 90 CAPSULE | Refills: 0 | Status: SHIPPED | OUTPATIENT
Start: 2019-02-01 | End: 2019-05-01 | Stop reason: SDUPTHER

## 2019-02-01 RX ORDER — METFORMIN HYDROCHLORIDE 500 MG/1
TABLET ORAL
Qty: 90 TABLET | Refills: 0 | Status: SHIPPED | OUTPATIENT
Start: 2019-02-01 | End: 2019-05-01 | Stop reason: SDUPTHER

## 2019-04-01 DIAGNOSIS — N39.3 SUI (STRESS URINARY INCONTINENCE, FEMALE): ICD-10-CM

## 2019-04-01 RX ORDER — OXYBUTYNIN CHLORIDE 5 MG/1
TABLET, EXTENDED RELEASE ORAL
Qty: 60 TABLET | Refills: 10 | Status: SHIPPED | OUTPATIENT
Start: 2019-04-01 | End: 2019-05-28 | Stop reason: SDUPTHER

## 2019-05-01 DIAGNOSIS — E11.69 TYPE 2 DIABETES MELLITUS WITH HYPERLIPIDEMIA: ICD-10-CM

## 2019-05-01 DIAGNOSIS — E11.59 HYPERTENSION ASSOCIATED WITH DIABETES: ICD-10-CM

## 2019-05-01 DIAGNOSIS — E78.5 TYPE 2 DIABETES MELLITUS WITH HYPERLIPIDEMIA: ICD-10-CM

## 2019-05-01 DIAGNOSIS — I15.2 HYPERTENSION ASSOCIATED WITH DIABETES: ICD-10-CM

## 2019-05-02 RX ORDER — METFORMIN HYDROCHLORIDE 500 MG/1
TABLET ORAL
Qty: 90 TABLET | Refills: 0 | Status: SHIPPED | OUTPATIENT
Start: 2019-05-02 | End: 2019-08-01 | Stop reason: SDUPTHER

## 2019-05-02 RX ORDER — GABAPENTIN 300 MG/1
CAPSULE ORAL
Qty: 90 CAPSULE | Refills: 0 | Status: SHIPPED | OUTPATIENT
Start: 2019-05-02 | End: 2019-08-01 | Stop reason: SDUPTHER

## 2019-05-08 DIAGNOSIS — J30.1 SEASONAL ALLERGIC RHINITIS DUE TO POLLEN: ICD-10-CM

## 2019-05-08 RX ORDER — IPRATROPIUM BROMIDE 42 UG/1
SPRAY, METERED NASAL
Qty: 15 ML | Refills: 4 | Status: SHIPPED | OUTPATIENT
Start: 2019-05-08 | End: 2020-02-06

## 2019-05-13 ENCOUNTER — OFFICE VISIT (OUTPATIENT)
Dept: CARDIOLOGY | Facility: CLINIC | Age: 84
End: 2019-05-13
Payer: MEDICARE

## 2019-05-13 VITALS
DIASTOLIC BLOOD PRESSURE: 69 MMHG | BODY MASS INDEX: 30.67 KG/M2 | HEIGHT: 59 IN | SYSTOLIC BLOOD PRESSURE: 121 MMHG | HEART RATE: 81 BPM | WEIGHT: 152.13 LBS

## 2019-05-13 DIAGNOSIS — E11.59 HYPERTENSION ASSOCIATED WITH DIABETES: ICD-10-CM

## 2019-05-13 DIAGNOSIS — I15.2 HYPERTENSION ASSOCIATED WITH DIABETES: ICD-10-CM

## 2019-05-13 DIAGNOSIS — I25.5 ISCHEMIC CARDIOMYOPATHY: Primary | ICD-10-CM

## 2019-05-13 DIAGNOSIS — Z79.02 LONG TERM (CURRENT) USE OF ANTITHROMBOTICS/ANTIPLATELETS: ICD-10-CM

## 2019-05-13 DIAGNOSIS — E66.9 OBESITY, CLASS I, BMI 30-34.9: ICD-10-CM

## 2019-05-13 DIAGNOSIS — E78.00 HYPERCHOLESTEROLEMIA: ICD-10-CM

## 2019-05-13 DIAGNOSIS — I51.89 DIASTOLIC DYSFUNCTION: ICD-10-CM

## 2019-05-13 PROBLEM — E66.811 OBESITY, CLASS I, BMI 30-34.9: Status: ACTIVE | Noted: 2019-05-13

## 2019-05-13 PROCEDURE — 3074F SYST BP LT 130 MM HG: CPT | Mod: S$GLB,,, | Performed by: INTERNAL MEDICINE

## 2019-05-13 PROCEDURE — 3074F PR MOST RECENT SYSTOLIC BLOOD PRESSURE < 130 MM HG: ICD-10-PCS | Mod: S$GLB,,, | Performed by: INTERNAL MEDICINE

## 2019-05-13 PROCEDURE — 1101F PR PT FALLS ASSESS DOC 0-1 FALLS W/OUT INJ PAST YR: ICD-10-PCS | Mod: S$GLB,,, | Performed by: INTERNAL MEDICINE

## 2019-05-13 PROCEDURE — 3078F DIAST BP <80 MM HG: CPT | Mod: S$GLB,,, | Performed by: INTERNAL MEDICINE

## 2019-05-13 PROCEDURE — 99214 PR OFFICE/OUTPT VISIT, EST, LEVL IV, 30-39 MIN: ICD-10-PCS | Mod: S$GLB,,, | Performed by: INTERNAL MEDICINE

## 2019-05-13 PROCEDURE — 1101F PT FALLS ASSESS-DOCD LE1/YR: CPT | Mod: S$GLB,,, | Performed by: INTERNAL MEDICINE

## 2019-05-13 PROCEDURE — 99999 PR PBB SHADOW E&M-EST. PATIENT-LVL IV: ICD-10-PCS | Mod: PBBFAC,,, | Performed by: INTERNAL MEDICINE

## 2019-05-13 PROCEDURE — 3078F PR MOST RECENT DIASTOLIC BLOOD PRESSURE < 80 MM HG: ICD-10-PCS | Mod: S$GLB,,, | Performed by: INTERNAL MEDICINE

## 2019-05-13 PROCEDURE — 99999 PR PBB SHADOW E&M-EST. PATIENT-LVL IV: CPT | Mod: PBBFAC,,, | Performed by: INTERNAL MEDICINE

## 2019-05-13 PROCEDURE — 99214 OFFICE O/P EST MOD 30 MIN: CPT | Mod: S$GLB,,, | Performed by: INTERNAL MEDICINE

## 2019-05-13 RX ORDER — ATORVASTATIN CALCIUM 40 MG/1
40 TABLET, FILM COATED ORAL DAILY
Qty: 90 TABLET | Refills: 1 | Status: CANCELLED | OUTPATIENT
Start: 2019-05-13

## 2019-05-13 RX ORDER — ATORVASTATIN CALCIUM 40 MG/1
40 TABLET, FILM COATED ORAL DAILY
Qty: 90 TABLET | Refills: 1 | Status: SHIPPED | OUTPATIENT
Start: 2019-05-13 | End: 2019-11-13 | Stop reason: SDUPTHER

## 2019-05-13 RX ORDER — CLOPIDOGREL BISULFATE 75 MG/1
75 TABLET ORAL DAILY
Qty: 90 TABLET | Refills: 1 | Status: SHIPPED | OUTPATIENT
Start: 2019-05-13 | End: 2019-11-25 | Stop reason: SDUPTHER

## 2019-05-13 NOTE — PROGRESS NOTES
Subjective:    Patient ID:  Dolores B LeJeune is a 83 y.o. female who presents for Hypertension; Coronary Artery Disease; and Hyperlipidemia        HPI  LABS FROM November NOTED, CMP OK, HBA1C 6.2%, HB 11.1, DOING WELL, NO CP, NO SOB, NO PALPITATION, NO TIA, NO NEAR SYNCOPE, SEE ROS    Past Medical History:   Diagnosis Date    Acute MI     Anticoagulant long-term use     Carpal tunnel syndrome     Cataracts, bilateral     Colon polyps     Coronary artery disease     DM type 2 (diabetes mellitus, type 2)     HTN (hypertension)     Hyperlipidemia     Osteoarthritis     Peripheral neuropathy      Past Surgical History:   Procedure Laterality Date    AORTOCORONARY BYPASS-CABG X 4 VESSELS w/ EVH TO RIGHT LEG N/A 1/24/2018    Performed by [facility]esteros [facility] at [facility]    CAROTID STENT      1    COLONOSCOPY  1/8/2008  [facility]    A 3 mm by 6 mm polyp in the cecum.  FRAGMENTS OF TUBULAR ADENOMA.    A 1 mm polyp in the recto-sigmoid colon.  HYPERPLASTIC POLYP.    Extremely redundant colon.      COLONOSCOPY N/A 1/18/2013    Per[facility]ins Jr[facility] at [facility]    CORONARY ARTERY BYPASS GRAFT  01/24/2018    [facility], Dr. Echols. LIMA to LAD, SVG to OM1, SVG to OM2, SVG to PDA    EYE SURGERY      eye surgery    HARVEST-VEIN-ENDOVASCULAR Right 1/24/2018    Per[facility] by [facility] at [facility]    HYSTERECTOMY      ovaries remain    Left heart cath Right 1/23/2018    Performed by Sh[facility]la Galvez MD at [facility] C[facility]    ptyregium      TONSILLECTOMY       Family History   Problem Relation Age of Onset    Tremor Mother     Hypertension Mother     Heart disease Mother     Stroke Father      Social History     Socioeconomic History    Marital status:      Spouse name: Not on file    Number of children: 4    Years of education: Not on file    Highest education level: Not on file   Occupational History    Not on file   Social Needs    Financial resource strain: Not on file    Food  insecurity:     Worry: Not on file     Inability: Not on file    Transportation needs:     Medical: Not on file     Non-medical: Not on file   Tobacco Use    Smoking status: Former Smoker     Last attempt to quit: 3/15/1972     Years since quittin.1    Smokeless tobacco: Never Used   Substance and Sexual Activity    Alcohol use: No    Drug use: No    Sexual activity: Not on file   Lifestyle    Physical activity:     Days per week: Not on file     Minutes per session: Not on file    Stress: Not on file   Relationships    Social connections:     Talks on phone: Not on file     Gets together: Not on file     Attends Restoration service: Not on file     Active member of club or organization: Not on file     Attends meetings of clubs or organizations: Not on file     Relationship status: Not on file   Other Topics Concern    Not on file   Social History Narrative    Not on file       Review of patient's allergies indicates:   Allergen Reactions    Albuterol Other (See Comments)     Palpitations/tremor      Sulfa (sulfonamide antibiotics) Rash       Current Outpatient Medications:     ACCU-CHEK SOFTCLIX LANCETS Misc, USE ONE AS DIRECTED TWICE DAILY, Disp: 200 each, Rfl: 10    aspirin (ECOTRIN) 81 MG EC tablet, Take 81 mg by mouth once daily., Disp: , Rfl:     atorvastatin (LIPITOR) 40 MG tablet, Take 1 tablet (40 mg total) by mouth once daily., Disp: 90 tablet, Rfl: 1    CALCIUM CARBONATE/VITAMIN D3 (CALCIUM 500 WITH D ORAL), Take by mouth.  , Disp: , Rfl:     clopidogrel (PLAVIX) 75 mg tablet, Take 1 tablet (75 mg total) by mouth once daily., Disp: 90 tablet, Rfl: 1    DEXTRAN 70/HYPROMELLOSE (ARTIFICIAL TEARS OPHT), Apply to eye., Disp: , Rfl:     gabapentin (NEURONTIN) 300 MG capsule, TAKE ONE CAPSULE BY MOUTH DAILY, Disp: 90 capsule, Rfl: 0    glimepiride (AMARYL) 2 MG tablet, Take 1 tablet (2 mg total) by mouth daily with breakfast., Disp: 90 tablet, Rfl: 3    ipratropium (ATROVENT) 42 mcg  (0.06 %) nasal spray, INSTILL 2 SPRAYS IN EACH NOSTRIL THREE TIMES DAILY, Disp: 15 mL, Rfl: 4    losartan (COZAAR) 25 MG tablet, Take 25 mg by mouth once daily. , Disp: , Rfl:     meclizine (ANTIVERT) 25 mg tablet, Take 1 tablet by mouth Three times daily as needed., Disp: , Rfl:     metFORMIN (GLUCOPHAGE) 500 MG tablet, TAKE 1/2 TABLET BY MOUTH TWICE DAILY WITH FOOD, Disp: 90 tablet, Rfl: 0    mupirocin (BACTROBAN) 2 % ointment, Apply topically 3 (three) times daily., Disp: 22 g, Rfl: 0    nitroGLYCERIN (NITROSTAT) 0.4 MG SL tablet, DISSOLVE ONE TABLET UNDER TONGUE EVERY 3 TO 5 MINUTES FOR CHEST PAIN. IF NO RELIEF AFTER 3 DOSES SEEK EMERGENCY COURTNEY, Disp: 25 tablet, Rfl: 0    ONETOUCH DELICA LANCETS 33 gauge Misc, , Disp: , Rfl:     ONETOUCH VERIO Strp, , Disp: , Rfl:     oxybutynin (DITROPAN-XL) 5 MG TR24, TAKE ONE TABLET BY MOUTH DAILY, Disp: 60 tablet, Rfl: 10    pantoprazole (PROTONIX) 40 MG tablet, Take 1 tablet (40 mg total) by mouth once daily., Disp: 30 tablet, Rfl: 11    RUTIN/HESP/BIOFLAV/C/[id number] (BIOFLEX ORAL), Take 1 tablet by mouth once daily at 6am., Disp: , Rfl:     Review of Systems   Constitution: Negative for chills, diaphoresis, fever, malaise/fatigue and night sweats. Weight gain: SOME.   HENT: Negative for congestion, hearing loss and nosebleeds.    Eyes: Negative for blurred vision and visual disturbance.   Cardiovascular: Negative for chest pain, claudication, cyanosis, dyspnea on exertion, irregular heartbeat, leg swelling (LLE, OCC), near-syncope, orthopnea, palpitations, paroxysmal nocturnal dyspnea and syncope.   Respiratory: Negative for cough, hemoptysis, shortness of breath and wheezing.    Endocrine: Negative for cold intolerance, heat intolerance and polyuria.   Hematologic/Lymphatic: Negative for adenopathy. Does not bruise/bleed easily.   Skin: Negative for itching and rash.   Musculoskeletal: Negative for back pain, falls and joint pain.   Gastrointestinal: Negative  "for abdominal pain, change in bowel habit, heartburn, hematemesis, jaundice and melena.   Genitourinary: Negative for dysuria, flank pain and frequency.   Neurological: Negative for brief paralysis, focal weakness, light-headedness, loss of balance, numbness, sensory change, tremors and weakness.   Psychiatric/Behavioral: Negative for altered mental status, depression and memory loss.   Allergic/Immunologic: Negative for hives and persistent infections.        Objective:      Vitals:    05/13/19 1301   BP: 121/69   Pulse: 81   Weight: 69 kg (152 lb 1.9 oz)   Height: 4' 11" (1.499 m)   PainSc: 0-No pain     Body mass index is 30.72 kg/m².    Physical Exam   Constitutional: She is oriented to person, place, and time. She appears well-developed and well-nourished. She is active.   OVERWEIGHT   HENT:   Head: Normocephalic and atraumatic.   Mouth/Throat: Oropharynx is clear and moist and mucous membranes are normal.   Eyes: Pupils are equal, round, and reactive to light. Conjunctivae and EOM are normal.   Neck: Trachea normal and normal range of motion. Neck supple. Normal carotid pulses, no hepatojugular reflux and no JVD present. Carotid bruit is not present. No edema and no erythema present. No thyromegaly present.   Cardiovascular: Normal rate, regular rhythm and normal heart sounds.  No extrasystoles are present. PMI is not displaced. Exam reveals no gallop and no friction rub.   No murmur heard.  Pulses:       Carotid pulses are 2+ on the right side, and 2+ on the left side.       Radial pulses are 2+ on the right side, and 2+ on the left side.        Femoral pulses are 2+ on the right side, and 2+ on the left side.       Popliteal pulses are 2+ on the right side, and 2+ on the left side.        Dorsalis pedis pulses are 2+ on the right side, and 2+ on the left side.        Posterior tibial pulses are 2+ on the right side, and 2+ on the left side.   Pulmonary/Chest: Effort normal and breath sounds normal. No " tachypnea and no bradypnea. She has no wheezes. She has no rales. She exhibits no tenderness.   Abdominal: Soft. Bowel sounds are normal. She exhibits no distension and no mass. There is no hepatosplenomegaly. There is no CVA tenderness.   Musculoskeletal: Normal range of motion. She exhibits no edema or tenderness.   TRACE TO MILD EDEMA LLE, VARICOSE VEINS   Lymphadenopathy:     She has no cervical adenopathy.   Neurological: She is alert and oriented to person, place, and time. She has normal strength. She displays no tremor. No cranial nerve deficit ([place]).   Skin: Skin is warm and dry. No cyanosis or erythema. No pallor.   Psychiatric: She has a normal mood and affect. Her speech is normal and behavior is normal.               ..    Chemistry        Component Value Date/Time     11/12/2018 1328    K 4.2 11/12/2018 1328     11/12/2018 1328    CO2 29 11/12/2018 1328    BUN 19 11/12/2018 1328    CREATININE 1.1 11/12/2018 1328     (H) 11/12/2018 1328        Component Value Date/Time    CALCIUM 9.3 11/12/2018 1328    ALKPHOS 72 11/12/2018 1328    AST 21 11/12/2018 1328    ALT 17 11/12/2018 1328    BILITOT 0.4 11/12/2018 1328    ESTGFRAFRICA 54.0 (A) 11/12/2018 1328    EGFRNONAA 46.9 (A) 11/12/2018 1328            ..  Lab Results   Component Value Date    CHOL 122 07/02/2018    CHOL 89 (L) 01/23/2018    CHOL 127 01/02/2018     Lab Results   Component Value Date    HDL 31 (L) 07/02/2018    HDL 29 (L) 01/23/2018    HDL 34 (L) 01/02/2018     Lab Results   Component Value Date    LDLCALC 73.4 07/02/2018    LDLCALC 38.8 (L) 01/23/2018    LDLCALC 62.4 (L) 01/02/2018     Lab Results   Component Value Date    TRIG 88 07/02/2018    TRIG 106 01/23/2018    TRIG 153 (H) 01/02/2018     Lab Results   Component Value Date    CHOLHDL 25.4 07/02/2018    CHOLHDL 32.6 01/23/2018    CHOLHDL 26.8 01/02/2018     ..  Lab Results   Component Value Date    WBC 11.93 02/28/2018    HGB 11.1 (L) 11/12/2018    HCT 31.8  (L) 02/28/2018    MCV 91 02/28/2018     02/28/2018       Test(s) Reviewed  I have reviewed the following in detail:  [] Stress test   [] Angiography   [] Echocardiogram   [x] Labs   [] Other:       Assessment:         ICD-10-CM ICD-9-CM   1. Ischemic cardiomyopathy I25.5 414.8   2. Diastolic dysfunction I51.9 429.9   3. Long term (current) use of antithrombotics/antiplatelets Z79.02 V58.63   4. Hypertension associated with diabetes E11.59 250.80    I10 401.9   5. Hypercholesterolemia E78.00 272.0   6. Obesity, Class I, BMI 30-34.9 E66.9 278.00     Problem List Items Addressed This Visit        Cardiac/Vascular    Hypercholesterolemia    Relevant Orders    Comprehensive metabolic panel    Lipid panel    Hypertension associated with diabetes    Relevant Orders    Comprehensive metabolic panel    Ischemic cardiomyopathy - Primary    Relevant Orders    Comprehensive metabolic panel    Diastolic dysfunction    Relevant Orders    Comprehensive metabolic panel       Hematology    Long term (current) use of antithrombotics/antiplatelets    Relevant Orders    Comprehensive metabolic panel    Hemoglobin       Endocrine    Obesity, Class I, BMI 30-34.9           Plan:     LABS SOON, ALL CV CLINICALLY STABLE, NO ANGINA, NO HF, NO TIA, NO CLINICAL ARRHYTHMIA,CONTINUE CURRENT MEDS, EDUCATION, DIET, EXERCISE, WEIGHT LOSS, RETURN TO CLINIC IN 6 MONTHS WITH LABS, EXPLAINED PLAN TO PATIENT AND       Ischemic cardiomyopathy  -     Comprehensive metabolic panel; Future; Expected date: 05/13/2019    Diastolic dysfunction  -     Comprehensive metabolic panel; Future; Expected date: 05/13/2019    Long term (current) use of antithrombotics/antiplatelets  -     Comprehensive metabolic panel; Future; Expected date: 05/13/2019  -     Hemoglobin; Future; Expected date: 05/13/2019    Hypertension associated with diabetes  -     Comprehensive metabolic panel; Future; Expected date: 05/13/2019    Hypercholesterolemia  -      Comprehensive metabolic panel; Future; Expected date: 05/13/2019  -     Lipid panel; Future; Expected date: 05/13/2019    Obesity, Class I, BMI 30-34.9    Other orders  -     clopidogrel (PLAVIX) 75 mg tablet; Take 1 tablet (75 mg total) by mouth once daily.  Dispense: 90 tablet; Refill: 1  -     atorvastatin (LIPITOR) 40 MG tablet; Take 1 tablet (40 mg total) by mouth once daily.  Dispense: 90 tablet; Refill: 1    RTC Low level/low impact aerobic exercise 5x's/wk. Heart healthy diet and risk factor modification.    See labs and med orders.    Aerobic exercise 5x's/wk. Heart healthy diet and risk factor modification.    See labs and med orders.

## 2019-05-15 ENCOUNTER — LAB VISIT (OUTPATIENT)
Dept: LAB | Facility: HOSPITAL | Age: 84
End: 2019-05-15
Attending: INTERNAL MEDICINE
Payer: MEDICARE

## 2019-05-15 ENCOUNTER — PATIENT OUTREACH (OUTPATIENT)
Dept: ADMINISTRATIVE | Facility: HOSPITAL | Age: 84
End: 2019-05-15

## 2019-05-15 DIAGNOSIS — I15.2 HYPERTENSION ASSOCIATED WITH DIABETES: ICD-10-CM

## 2019-05-15 DIAGNOSIS — I25.5 ISCHEMIC CARDIOMYOPATHY: ICD-10-CM

## 2019-05-15 DIAGNOSIS — I51.89 DIASTOLIC DYSFUNCTION: ICD-10-CM

## 2019-05-15 DIAGNOSIS — Z79.02 LONG TERM (CURRENT) USE OF ANTITHROMBOTICS/ANTIPLATELETS: ICD-10-CM

## 2019-05-15 DIAGNOSIS — E11.59 HYPERTENSION ASSOCIATED WITH DIABETES: ICD-10-CM

## 2019-05-15 DIAGNOSIS — E78.00 HYPERCHOLESTEROLEMIA: ICD-10-CM

## 2019-05-15 LAB
ALBUMIN SERPL BCP-MCNC: 3.6 G/DL (ref 3.5–5.2)
ALP SERPL-CCNC: 67 U/L (ref 55–135)
ALT SERPL W/O P-5'-P-CCNC: 11 U/L (ref 10–44)
ANION GAP SERPL CALC-SCNC: 8 MMOL/L (ref 8–16)
AST SERPL-CCNC: 18 U/L (ref 10–40)
BILIRUB SERPL-MCNC: 0.3 MG/DL (ref 0.1–1)
BUN SERPL-MCNC: 24 MG/DL (ref 8–23)
CALCIUM SERPL-MCNC: 9.6 MG/DL (ref 8.7–10.5)
CHLORIDE SERPL-SCNC: 104 MMOL/L (ref 95–110)
CHOLEST SERPL-MCNC: 147 MG/DL (ref 120–199)
CHOLEST/HDLC SERPL: 3.8 {RATIO} (ref 2–5)
CO2 SERPL-SCNC: 27 MMOL/L (ref 23–29)
CREAT SERPL-MCNC: 1.3 MG/DL (ref 0.5–1.4)
EST. GFR  (AFRICAN AMERICAN): 44.1 ML/MIN/1.73 M^2
EST. GFR  (NON AFRICAN AMERICAN): 38.3 ML/MIN/1.73 M^2
GLUCOSE SERPL-MCNC: 110 MG/DL (ref 70–110)
HDLC SERPL-MCNC: 39 MG/DL (ref 40–75)
HDLC SERPL: 26.5 % (ref 20–50)
HGB BLD-MCNC: 11.6 G/DL (ref 12–16)
LDLC SERPL CALC-MCNC: 87.6 MG/DL (ref 63–159)
NONHDLC SERPL-MCNC: 108 MG/DL
POTASSIUM SERPL-SCNC: 5.4 MMOL/L (ref 3.5–5.1)
PROT SERPL-MCNC: 7.4 G/DL (ref 6–8.4)
SODIUM SERPL-SCNC: 139 MMOL/L (ref 136–145)
TRIGL SERPL-MCNC: 102 MG/DL (ref 30–150)

## 2019-05-15 PROCEDURE — 36415 COLL VENOUS BLD VENIPUNCTURE: CPT | Mod: PO

## 2019-05-15 PROCEDURE — 80061 LIPID PANEL: CPT

## 2019-05-15 PROCEDURE — 85018 HEMOGLOBIN: CPT

## 2019-05-15 PROCEDURE — 80053 COMPREHEN METABOLIC PANEL: CPT

## 2019-05-15 NOTE — PROGRESS NOTES
Health Maintenance Due   Topic Date Due    Eye Exam  12/22/2017       Chart review complete/scrubbed 05/15/2019

## 2019-05-16 DIAGNOSIS — E11.69 TYPE 2 DIABETES MELLITUS WITH HYPERLIPIDEMIA: ICD-10-CM

## 2019-05-16 DIAGNOSIS — I15.2 HYPERTENSION ASSOCIATED WITH DIABETES: ICD-10-CM

## 2019-05-16 DIAGNOSIS — E11.59 HYPERTENSION ASSOCIATED WITH DIABETES: ICD-10-CM

## 2019-05-16 DIAGNOSIS — E78.5 TYPE 2 DIABETES MELLITUS WITH HYPERLIPIDEMIA: ICD-10-CM

## 2019-05-16 DIAGNOSIS — I10 ESSENTIAL HYPERTENSION: Primary | ICD-10-CM

## 2019-05-16 RX ORDER — GLIMEPIRIDE 2 MG/1
TABLET ORAL
Qty: 90 TABLET | Refills: 2 | Status: SHIPPED | OUTPATIENT
Start: 2019-05-16 | End: 2020-02-19

## 2019-05-16 RX ORDER — LOSARTAN POTASSIUM 25 MG/1
25 TABLET ORAL DAILY
Qty: 30 TABLET | Refills: 5 | Status: SHIPPED | OUTPATIENT
Start: 2019-05-16 | End: 2019-11-13 | Stop reason: SDUPTHER

## 2019-05-17 ENCOUNTER — TELEPHONE (OUTPATIENT)
Dept: CARDIOLOGY | Facility: CLINIC | Age: 84
End: 2019-05-17

## 2019-05-17 NOTE — TELEPHONE ENCOUNTER
----- Message from Shayla Galvez MD sent at 5/16/2019  4:49 PM CDT -----  Imaging tests results are within normal parameters.  Keep your follow up appointment.

## 2019-05-28 ENCOUNTER — OFFICE VISIT (OUTPATIENT)
Dept: FAMILY MEDICINE | Facility: CLINIC | Age: 84
End: 2019-05-28
Payer: MEDICARE

## 2019-05-28 ENCOUNTER — LAB VISIT (OUTPATIENT)
Dept: LAB | Facility: HOSPITAL | Age: 84
End: 2019-05-28
Attending: FAMILY MEDICINE
Payer: MEDICARE

## 2019-05-28 VITALS
WEIGHT: 151.25 LBS | SYSTOLIC BLOOD PRESSURE: 124 MMHG | HEART RATE: 81 BPM | BODY MASS INDEX: 30.49 KG/M2 | DIASTOLIC BLOOD PRESSURE: 60 MMHG | HEIGHT: 59 IN

## 2019-05-28 DIAGNOSIS — N39.3 SUI (STRESS URINARY INCONTINENCE, FEMALE): ICD-10-CM

## 2019-05-28 DIAGNOSIS — I25.10 CORONARY ARTERY DISEASE, ANGINA PRESENCE UNSPECIFIED, UNSPECIFIED VESSEL OR LESION TYPE, UNSPECIFIED WHETHER NATIVE OR TRANSPLANTED HEART: ICD-10-CM

## 2019-05-28 DIAGNOSIS — N28.9 RENAL INSUFFICIENCY: ICD-10-CM

## 2019-05-28 DIAGNOSIS — E66.9 OBESITY, CLASS I, BMI 30-34.9: ICD-10-CM

## 2019-05-28 DIAGNOSIS — E11.8 TYPE 2 DIABETES MELLITUS WITH COMPLICATION, WITHOUT LONG-TERM CURRENT USE OF INSULIN: ICD-10-CM

## 2019-05-28 DIAGNOSIS — E78.00 HYPERCHOLESTEROLEMIA: ICD-10-CM

## 2019-05-28 DIAGNOSIS — I15.2 HYPERTENSION ASSOCIATED WITH DIABETES: Primary | ICD-10-CM

## 2019-05-28 DIAGNOSIS — E11.59 HYPERTENSION ASSOCIATED WITH DIABETES: Primary | ICD-10-CM

## 2019-05-28 LAB
ESTIMATED AVG GLUCOSE: 140 MG/DL (ref 68–131)
HBA1C MFR BLD HPLC: 6.5 % (ref 4–5.6)

## 2019-05-28 PROCEDURE — 3078F PR MOST RECENT DIASTOLIC BLOOD PRESSURE < 80 MM HG: ICD-10-PCS | Mod: S$GLB,,, | Performed by: FAMILY MEDICINE

## 2019-05-28 PROCEDURE — 1101F PR PT FALLS ASSESS DOC 0-1 FALLS W/OUT INJ PAST YR: ICD-10-PCS | Mod: S$GLB,,, | Performed by: FAMILY MEDICINE

## 2019-05-28 PROCEDURE — 3074F PR MOST RECENT SYSTOLIC BLOOD PRESSURE < 130 MM HG: ICD-10-PCS | Mod: S$GLB,,, | Performed by: FAMILY MEDICINE

## 2019-05-28 PROCEDURE — 99214 OFFICE O/P EST MOD 30 MIN: CPT | Mod: S$GLB,,, | Performed by: FAMILY MEDICINE

## 2019-05-28 PROCEDURE — 3078F DIAST BP <80 MM HG: CPT | Mod: S$GLB,,, | Performed by: FAMILY MEDICINE

## 2019-05-28 PROCEDURE — 99999 PR PBB SHADOW E&M-EST. PATIENT-LVL III: ICD-10-PCS | Mod: PBBFAC,,, | Performed by: FAMILY MEDICINE

## 2019-05-28 PROCEDURE — 1101F PT FALLS ASSESS-DOCD LE1/YR: CPT | Mod: S$GLB,,, | Performed by: FAMILY MEDICINE

## 2019-05-28 PROCEDURE — 83036 HEMOGLOBIN GLYCOSYLATED A1C: CPT

## 2019-05-28 PROCEDURE — 3074F SYST BP LT 130 MM HG: CPT | Mod: S$GLB,,, | Performed by: FAMILY MEDICINE

## 2019-05-28 PROCEDURE — 99214 PR OFFICE/OUTPT VISIT, EST, LEVL IV, 30-39 MIN: ICD-10-PCS | Mod: S$GLB,,, | Performed by: FAMILY MEDICINE

## 2019-05-28 PROCEDURE — 99499 RISK ADDL DX/OHS AUDIT: ICD-10-PCS | Mod: S$GLB,,, | Performed by: FAMILY MEDICINE

## 2019-05-28 PROCEDURE — 36415 COLL VENOUS BLD VENIPUNCTURE: CPT | Mod: PO

## 2019-05-28 PROCEDURE — 99499 UNLISTED E&M SERVICE: CPT | Mod: S$GLB,,, | Performed by: FAMILY MEDICINE

## 2019-05-28 PROCEDURE — 99999 PR PBB SHADOW E&M-EST. PATIENT-LVL III: CPT | Mod: PBBFAC,,, | Performed by: FAMILY MEDICINE

## 2019-05-28 RX ORDER — OXYBUTYNIN CHLORIDE 10 MG/1
10 TABLET, EXTENDED RELEASE ORAL DAILY
Qty: 90 TABLET | Refills: 1 | Status: SHIPPED | OUTPATIENT
Start: 2019-05-28 | End: 2019-12-14 | Stop reason: SDUPTHER

## 2019-05-28 RX ORDER — CHOLECALCIFEROL (VITAMIN D3) 125 MCG
CAPSULE ORAL
COMMUNITY
End: 2023-09-13

## 2019-05-28 NOTE — PROGRESS NOTES
"Subjective:       Patient ID: Dolores B LeJeune is a 83 y.o. female.    Chief Complaint: Follow-up (6 month)    Pt is known to me.  Pt is here for followup of chronic medical issues.  We reviewed her recent labs done by Dr. Poe.  The pt is concerned because she cannot hold her urine.  She is on low dose oxybutinin with no relief.  She is having no dizziness.  Her glucoses continue to be well controlled at home.    Review of Systems   Constitutional: Negative for activity change, appetite change, fatigue and unexpected weight change.   Eyes: Negative for visual disturbance.   Respiratory: Negative for cough, chest tightness and shortness of breath.    Cardiovascular: Negative for chest pain, palpitations and leg swelling.   Gastrointestinal: Negative for abdominal pain, constipation, diarrhea, nausea and vomiting.   Endocrine: Negative for cold intolerance, heat intolerance and polyuria.   Genitourinary: Positive for enuresis (when she holds her urine). Negative for decreased urine volume, dysuria and urgency.   Musculoskeletal: Negative for arthralgias and back pain.   Skin: Negative for rash.   Neurological: Negative for numbness and headaches.   Psychiatric/Behavioral: Negative for confusion and sleep disturbance.       Objective:       Vitals:    05/28/19 1317   BP: 124/60   BP Location: Left arm   Patient Position: Sitting   BP Method: Medium (Manual)   Pulse: 81   Weight: 68.6 kg (151 lb 3.8 oz)   Height: 4' 11" (1.499 m)     Physical Exam   Constitutional: She is oriented to person, place, and time. She appears well-developed and well-nourished.   HENT:   Head: Normocephalic.   Eyes: Pupils are equal, round, and reactive to light. Conjunctivae and EOM are normal.   Neck: Normal range of motion. Neck supple. No thyromegaly present.   Cardiovascular: Normal rate, regular rhythm and normal heart sounds.   Pulses:       Dorsalis pedis pulses are 2+ on the right side, and 2+ on the left side.        Posterior " tibial pulses are 2+ on the right side, and 2+ on the left side.   Pulmonary/Chest: Effort normal and breath sounds normal.   Abdominal: Soft. Bowel sounds are normal. There is no tenderness.   Musculoskeletal: Normal range of motion. She exhibits no tenderness or deformity.        Right foot: There is normal range of motion and no deformity.        Left foot: There is normal range of motion and no deformity.   Feet:   Right Foot:   Protective Sensation: 7 sites tested. 7 sites sensed.   Skin Integrity: Negative for ulcer.   Left Foot:   Protective Sensation: 7 sites tested. 7 sites sensed.   Skin Integrity: Negative for ulcer.   Lymphadenopathy:     She has no cervical adenopathy.   Neurological: She is alert and oriented to person, place, and time. She displays normal reflexes. No cranial nerve deficit. She exhibits normal muscle tone. Coordination normal.   Skin: Skin is warm and dry.   Psychiatric: She has a normal mood and affect. Her behavior is normal.       Assessment:       1. Hypertension associated with diabetes    2. Hypercholesterolemia    3. Coronary artery disease, angina presence unspecified, unspecified vessel or lesion type, unspecified whether native or transplanted heart    4. Renal insufficiency    5. Type 2 diabetes mellitus with complication, without long-term current use of insulin    6. Obesity, Class I, BMI 30-34.9    7. MIGUEL ANGEL (stress urinary incontinence, female)        Plan:       Kelly was seen today for follow-up.    Diagnoses and all orders for this visit:    Hypertension associated with diabetes    Hypercholesterolemia    Coronary artery disease, angina presence unspecified, unspecified vessel or lesion type, unspecified whether native or transplanted heart    Renal insufficiency    Type 2 diabetes mellitus with complication, without long-term current use of insulin  -     Hemoglobin A1c; Future    Obesity, Class I, BMI 30-34.9    MIGUEL ANGEL (stress urinary incontinence, female)  -      oxybutynin (DITROPAN-XL) 10 MG 24 hr tablet; Take 1 tablet (10 mg total) by mouth once daily.      During this visit, I reviewed the pt's history, medications, allergies, and problem list.

## 2019-05-28 NOTE — PROGRESS NOTES
Patient, Dolores B LeJeune (MRN #[id number]), presented with a recent Estimated Glumerular Filtration Rate (EGFR) between 30 and 45 consistent with the definition of chronic kidney disease stage 3 - moderate (ICD10 - N18.3).    eGFR if non    Date Value Ref Range Status   05/15/2019 38.3 (A) >60 mL/min/1.73 m^2 Final     Comment:     Calculation used to obtain the estimated glomerular filtration  rate (eGFR) is the CKD-EPI equation.          The patient's chronic kidney disease stage 3 was monitored, evaluated, addressed and/or treated. This addendum to the medical record is made on 05/28/2019.

## 2019-05-29 RX ORDER — BLOOD-GLUCOSE METER
1 EACH MISCELLANEOUS 2 TIMES DAILY
Qty: 100 EACH | Refills: 11 | Status: SHIPPED | OUTPATIENT
Start: 2019-05-29 | End: 2020-08-23

## 2019-08-01 DIAGNOSIS — E11.69 TYPE 2 DIABETES MELLITUS WITH HYPERLIPIDEMIA: ICD-10-CM

## 2019-08-01 DIAGNOSIS — E11.59 HYPERTENSION ASSOCIATED WITH DIABETES: ICD-10-CM

## 2019-08-01 DIAGNOSIS — E78.5 TYPE 2 DIABETES MELLITUS WITH HYPERLIPIDEMIA: ICD-10-CM

## 2019-08-01 DIAGNOSIS — I15.2 HYPERTENSION ASSOCIATED WITH DIABETES: ICD-10-CM

## 2019-08-02 RX ORDER — METFORMIN HYDROCHLORIDE 500 MG/1
TABLET ORAL
Qty: 90 TABLET | Refills: 0 | Status: SHIPPED | OUTPATIENT
Start: 2019-08-02 | End: 2019-11-13 | Stop reason: SDUPTHER

## 2019-08-02 RX ORDER — GABAPENTIN 300 MG/1
CAPSULE ORAL
Qty: 90 CAPSULE | Refills: 0 | Status: SHIPPED | OUTPATIENT
Start: 2019-08-02 | End: 2019-10-21 | Stop reason: SDUPTHER

## 2019-10-21 RX ORDER — GABAPENTIN 300 MG/1
CAPSULE ORAL
Qty: 90 CAPSULE | Refills: 0 | Status: SHIPPED | OUTPATIENT
Start: 2019-10-21 | End: 2020-02-03

## 2019-11-12 ENCOUNTER — LAB VISIT (OUTPATIENT)
Dept: LAB | Facility: HOSPITAL | Age: 84
End: 2019-11-12
Attending: FAMILY MEDICINE
Payer: MEDICARE

## 2019-11-12 ENCOUNTER — OFFICE VISIT (OUTPATIENT)
Dept: FAMILY MEDICINE | Facility: CLINIC | Age: 84
End: 2019-11-12
Payer: MEDICARE

## 2019-11-12 VITALS
HEIGHT: 59 IN | WEIGHT: 145.75 LBS | SYSTOLIC BLOOD PRESSURE: 124 MMHG | BODY MASS INDEX: 29.38 KG/M2 | OXYGEN SATURATION: 98 % | HEART RATE: 81 BPM | DIASTOLIC BLOOD PRESSURE: 60 MMHG

## 2019-11-12 DIAGNOSIS — E11.69 TYPE 2 DIABETES MELLITUS WITH HYPERLIPIDEMIA: ICD-10-CM

## 2019-11-12 DIAGNOSIS — Z95.1 S/P CABG (CORONARY ARTERY BYPASS GRAFT): ICD-10-CM

## 2019-11-12 DIAGNOSIS — I15.2 HYPERTENSION ASSOCIATED WITH DIABETES: ICD-10-CM

## 2019-11-12 DIAGNOSIS — N28.9 RENAL INSUFFICIENCY: ICD-10-CM

## 2019-11-12 DIAGNOSIS — I15.2 HYPERTENSION ASSOCIATED WITH DIABETES: Primary | ICD-10-CM

## 2019-11-12 DIAGNOSIS — E78.5 TYPE 2 DIABETES MELLITUS WITH HYPERLIPIDEMIA: ICD-10-CM

## 2019-11-12 DIAGNOSIS — E11.59 HYPERTENSION ASSOCIATED WITH DIABETES: ICD-10-CM

## 2019-11-12 DIAGNOSIS — E11.59 HYPERTENSION ASSOCIATED WITH DIABETES: Primary | ICD-10-CM

## 2019-11-12 DIAGNOSIS — E78.00 HYPERCHOLESTEROLEMIA: ICD-10-CM

## 2019-11-12 DIAGNOSIS — Z12.31 ENCOUNTER FOR SCREENING MAMMOGRAM FOR BREAST CANCER: ICD-10-CM

## 2019-11-12 PROBLEM — E66.811 OBESITY, CLASS I, BMI 30-34.9: Status: RESOLVED | Noted: 2019-05-13 | Resolved: 2019-11-12

## 2019-11-12 PROBLEM — I22.2 SUBSEQUENT NON-ST ELEVATION (NSTEMI) MYOCARDIAL INFARCTION: Status: RESOLVED | Noted: 2018-01-22 | Resolved: 2019-11-12

## 2019-11-12 PROBLEM — E66.9 OBESITY, CLASS I, BMI 30-34.9: Status: RESOLVED | Noted: 2019-05-13 | Resolved: 2019-11-12

## 2019-11-12 PROBLEM — N17.9 AKI (ACUTE KIDNEY INJURY): Status: RESOLVED | Noted: 2018-01-22 | Resolved: 2019-11-12

## 2019-11-12 LAB
BILIRUB UR QL STRIP: NEGATIVE
CLARITY UR: CLEAR
COLOR UR: YELLOW
GLUCOSE UR QL STRIP: NEGATIVE
HGB UR QL STRIP: NEGATIVE
KETONES UR QL STRIP: NEGATIVE
LEUKOCYTE ESTERASE UR QL STRIP: NEGATIVE
NITRITE UR QL STRIP: NEGATIVE
PH UR STRIP: 6 [PH] (ref 5–8)
PROT UR QL STRIP: NEGATIVE
SP GR UR STRIP: 1.01 (ref 1–1.03)
URN SPEC COLLECT METH UR: NORMAL

## 2019-11-12 PROCEDURE — 99999 PR PBB SHADOW E&M-EST. PATIENT-LVL III: ICD-10-PCS | Mod: PBBFAC,,, | Performed by: FAMILY MEDICINE

## 2019-11-12 PROCEDURE — 3074F PR MOST RECENT SYSTOLIC BLOOD PRESSURE < 130 MM HG: ICD-10-PCS | Mod: S$GLB,,, | Performed by: FAMILY MEDICINE

## 2019-11-12 PROCEDURE — 99214 OFFICE O/P EST MOD 30 MIN: CPT | Mod: 25,S$GLB,, | Performed by: FAMILY MEDICINE

## 2019-11-12 PROCEDURE — 90662 FLU VACCINE - HIGH DOSE (65+) PRESERVATIVE FREE IM: ICD-10-PCS | Mod: S$GLB,,, | Performed by: FAMILY MEDICINE

## 2019-11-12 PROCEDURE — 90662 IIV NO PRSV INCREASED AG IM: CPT | Mod: S$GLB,,, | Performed by: FAMILY MEDICINE

## 2019-11-12 PROCEDURE — 3078F PR MOST RECENT DIASTOLIC BLOOD PRESSURE < 80 MM HG: ICD-10-PCS | Mod: S$GLB,,, | Performed by: FAMILY MEDICINE

## 2019-11-12 PROCEDURE — 1101F PT FALLS ASSESS-DOCD LE1/YR: CPT | Mod: S$GLB,,, | Performed by: FAMILY MEDICINE

## 2019-11-12 PROCEDURE — 81003 URINALYSIS AUTO W/O SCOPE: CPT | Mod: PO

## 2019-11-12 PROCEDURE — G0008 ADMIN INFLUENZA VIRUS VAC: HCPCS | Mod: S$GLB,,, | Performed by: FAMILY MEDICINE

## 2019-11-12 PROCEDURE — 1101F PR PT FALLS ASSESS DOC 0-1 FALLS W/OUT INJ PAST YR: ICD-10-PCS | Mod: S$GLB,,, | Performed by: FAMILY MEDICINE

## 2019-11-12 PROCEDURE — 3078F DIAST BP <80 MM HG: CPT | Mod: S$GLB,,, | Performed by: FAMILY MEDICINE

## 2019-11-12 PROCEDURE — 99214 PR OFFICE/OUTPT VISIT, EST, LEVL IV, 30-39 MIN: ICD-10-PCS | Mod: 25,S$GLB,, | Performed by: FAMILY MEDICINE

## 2019-11-12 PROCEDURE — 3074F SYST BP LT 130 MM HG: CPT | Mod: S$GLB,,, | Performed by: FAMILY MEDICINE

## 2019-11-12 PROCEDURE — 99999 PR PBB SHADOW E&M-EST. PATIENT-LVL III: CPT | Mod: PBBFAC,,, | Performed by: FAMILY MEDICINE

## 2019-11-12 PROCEDURE — G0008 FLU VACCINE - HIGH DOSE (65+) PRESERVATIVE FREE IM: ICD-10-PCS | Mod: S$GLB,,, | Performed by: FAMILY MEDICINE

## 2019-11-12 NOTE — PROGRESS NOTES
"Subjective:       Patient ID: Dolores B LeJeune is a 84 y.o. female.    Chief Complaint: Follow-up (6 month)    Pt is known to me.  Pt is here for followup of chronic medical issues.  The pt is feeling well.  She has just returned from a 2 week vacation in the [facility].    Review of Systems   Constitutional: Negative for activity change, appetite change, fatigue and unexpected weight change.   Eyes: Negative for visual disturbance.   Respiratory: Negative for cough, chest tightness and shortness of breath.    Cardiovascular: Negative for chest pain, palpitations and leg swelling.   Gastrointestinal: Negative for abdominal pain, constipation, diarrhea, nausea and vomiting.   Endocrine: Negative for cold intolerance, heat intolerance and polyuria.   Genitourinary: Positive for enuresis (when she holds her urine). Negative for decreased urine volume, dysuria and urgency.   Musculoskeletal: Negative for arthralgias and back pain.   Skin: Negative for rash.   Neurological: Negative for numbness and headaches.   Psychiatric/Behavioral: Negative for confusion and sleep disturbance.       Objective:       Vitals:    11/12/19 1032   BP: 124/60   BP Location: Right arm   Patient Position: Sitting   BP Method: Medium (Manual)   Pulse: 81   SpO2: 98%   Weight: 66.1 kg (145 lb 11.6 oz)   Height: 4' 11" (1.499 m)     Physical Exam   Constitutional: She is oriented to person, place, and time. She appears well-developed and well-nourished.   HENT:   Head: Normocephalic.   Eyes: Pupils are equal, round, and reactive to light. Conjunctivae and EOM are normal.   Neck: Normal range of motion. Neck supple. No thyromegaly present.   Cardiovascular: Normal rate, regular rhythm and normal heart sounds.   Pulses:       Dorsalis pedis pulses are 2+ on the right side, and 2+ on the left side.        Posterior tibial pulses are 2+ on the right side, and 2+ on the left side.   Pulmonary/Chest: Effort normal and breath sounds normal. "   Abdominal: Soft. Bowel sounds are normal. There is no tenderness.   Musculoskeletal: Normal range of motion. She exhibits no tenderness or deformity.        Right foot: There is normal range of motion and no deformity.        Left foot: There is normal range of motion and no deformity.   Feet:   Right Foot:   Protective Sensation: 7 sites tested. 7 sites sensed.   Skin Integrity: Negative for ulcer.   Left Foot:   Protective Sensation: 7 sites tested. 7 sites sensed.   Skin Integrity: Negative for ulcer.   Lymphadenopathy:     She has no cervical adenopathy.   Neurological: She is alert and oriented to person, place, and time. She displays normal reflexes. No cranial nerve deficit. She exhibits normal muscle tone. Coordination normal.   Skin: Skin is warm and dry.   Psychiatric: She has a normal mood and affect. Her behavior is normal.       Assessment:       1. Hypertension associated with diabetes    2. Hypercholesterolemia    3. S/P CABG (coronary artery bypass graft)    4. Renal insufficiency    5. Type 2 diabetes mellitus with hyperlipidemia    6. Encounter for screening mammogram for breast cancer        Plan:       Kelly was seen today for follow-up.    Diagnoses and all orders for this visit:    Hypertension associated with diabetes  -     Urinalysis; Future    Hypercholesterolemia    S/P CABG (coronary artery bypass graft)    Renal insufficiency    Type 2 diabetes mellitus with hyperlipidemia  -     Hemoglobin A1c; Future  -     Urinalysis; Future    Encounter for screening mammogram for breast cancer  -     Mammo Digital Screening Bilateral With CAD; Future      During this visit, I reviewed the pt's history, medications, allergies, and problem list.

## 2019-11-13 DIAGNOSIS — I10 ESSENTIAL HYPERTENSION: ICD-10-CM

## 2019-11-13 DIAGNOSIS — E11.69 TYPE 2 DIABETES MELLITUS WITH HYPERLIPIDEMIA: ICD-10-CM

## 2019-11-13 DIAGNOSIS — I15.2 HYPERTENSION ASSOCIATED WITH DIABETES: ICD-10-CM

## 2019-11-13 DIAGNOSIS — E11.59 HYPERTENSION ASSOCIATED WITH DIABETES: ICD-10-CM

## 2019-11-13 DIAGNOSIS — E78.5 TYPE 2 DIABETES MELLITUS WITH HYPERLIPIDEMIA: ICD-10-CM

## 2019-11-13 RX ORDER — LOSARTAN POTASSIUM 25 MG/1
TABLET ORAL
Qty: 30 TABLET | Refills: 0 | Status: SHIPPED | OUTPATIENT
Start: 2019-11-13 | End: 2019-11-25 | Stop reason: SDUPTHER

## 2019-11-13 RX ORDER — ATORVASTATIN CALCIUM 40 MG/1
TABLET, FILM COATED ORAL
Qty: 30 TABLET | Refills: 0 | Status: SHIPPED | OUTPATIENT
Start: 2019-11-13 | End: 2019-11-25 | Stop reason: SDUPTHER

## 2019-11-14 RX ORDER — METFORMIN HYDROCHLORIDE 500 MG/1
TABLET ORAL
Qty: 90 TABLET | Refills: 0 | Status: SHIPPED | OUTPATIENT
Start: 2019-11-14 | End: 2020-02-14

## 2019-11-14 NOTE — PROGRESS NOTES
Refill Authorization Note    Mrs.LeJeune is requesting a refill authorization.    Brief assessment and rationale for refill: APPROVE: prr   Name and strength of medication: metFORMIN (GLUCOPHAGE) 500 MG tablet            Medication reconciliation completed: No              How patient will take medication: t1/2t po bid           Comments:   Requested Prescriptions   Pending Prescriptions Disp Refills    metFORMIN (GLUCOPHAGE) 500 MG tablet [Pharmacy Med Name: METFORMIN 500 MG         TAB] 90 tablet 0     Sig: TAKE 1/2 TABLET BY MOUTH TWICE DAILY WITH FOOD       Endocrinology:  Diabetes - Biguanides Passed - 11/13/2019  5:47 PM        Passed - Patient is at least 18 years old        Passed - Office visit in past 12 months or future 90 days     Recent Outpatient Visits            2 days ago Hypertension associated with diabetes    [facility] BEBO Soto MD    5 months ago Hypertension associated with diabetes    [facility] Rashawn Soto MD    6 months ago Ischemic cardiomyopathy    [facility] Shayla Galvez MD    11 months ago Controlled type 2 diabetes mellitus without complication, without long-term current use of insulin    [facility] BEBO Soto MD    1 year ago Ischemic cardiomyopathy    [facility] Shayla Galvez MD          Future Appointments              In 1 week Shayla Galvez MD [facility]    In 6 months BEBO Soto MD [facility]                Passed - Cr is 1.4 or below and within 360 days     Creatinine   Date Value Ref Range Status   05/15/2019 1.3 0.5 - 1.4 mg/dL Final   11/12/2018 1.1 0.5 - 1.4 mg/dL Final   07/23/2018 1.4 0.5 - 1.4 mg/dL Final              Passed - HBA1C is 7.9 or below and within 180 days     Hemoglobin A1C   Date Value Ref Range Status   11/12/2019 6.3 (H) 4.0 - 5.6 % Final     Comment:     ADA Screening Guidelines:  5.7-6.4%  Consistent with  prediabetes  >or=6.5%  Consistent with diabetes  High levels of fetal hemoglobin interfere with the HbA1C  assay. Heterozygous hemoglobin variants (HbS, HgC, etc)do  not significantly interfere with this assay.   However, presence of multiple variants may affect accuracy.     05/28/2019 6.5 (H) 4.0 - 5.6 % Final     Comment:     ADA Screening Guidelines:  5.7-6.4%  Consistent with prediabetes  >or=6.5%  Consistent with diabetes  High levels of fetal hemoglobin interfere with the HbA1C  assay. Heterozygous hemoglobin variants (HbS, HgC, etc)do  not significantly interfere with this assay.   However, presence of multiple variants may affect accuracy.     11/28/2018 6.2 (H) 4.0 - 5.6 % Final     Comment:     ADA Screening Guidelines:  5.7-6.4%  Consistent with prediabetes  >or=6.5%  Consistent with diabetes  High levels of fetal hemoglobin interfere with the HbA1C  assay. Heterozygous hemoglobin variants (HbS, HgC, etc)do  not significantly interfere with this assay.   However, presence of multiple variants may affect accuracy.                Passed - eGFR is 30 or above and within 360 days     eGFR if non    Date Value Ref Range Status   05/15/2019 38.3 (A) >60 mL/min/1.73 m^2 Final     Comment:     Calculation used to obtain the estimated glomerular filtration  rate (eGFR) is the CKD-EPI equation.      11/12/2018 46.9 (A) >60 mL/min/1.73 m^2 Final     Comment:     Calculation used to obtain the estimated glomerular filtration  rate (eGFR) is the CKD-EPI equation.      07/23/2018 35.0 (A) >60 mL/min/1.73 m^2 Final     Comment:     Calculation used to obtain the estimated glomerular filtration  rate (eGFR) is the CKD-EPI equation.

## 2019-11-15 ENCOUNTER — PES CALL (OUTPATIENT)
Dept: ADMINISTRATIVE | Facility: CLINIC | Age: 84
End: 2019-11-15

## 2019-11-25 ENCOUNTER — LAB VISIT (OUTPATIENT)
Dept: LAB | Facility: HOSPITAL | Age: 84
End: 2019-11-25
Attending: INTERNAL MEDICINE
Payer: MEDICARE

## 2019-11-25 ENCOUNTER — OFFICE VISIT (OUTPATIENT)
Dept: CARDIOLOGY | Facility: CLINIC | Age: 84
End: 2019-11-25
Payer: MEDICARE

## 2019-11-25 VITALS
HEART RATE: 92 BPM | BODY MASS INDEX: 29.95 KG/M2 | DIASTOLIC BLOOD PRESSURE: 60 MMHG | WEIGHT: 148.56 LBS | HEIGHT: 59 IN | SYSTOLIC BLOOD PRESSURE: 107 MMHG

## 2019-11-25 DIAGNOSIS — I51.89 DIASTOLIC DYSFUNCTION: ICD-10-CM

## 2019-11-25 DIAGNOSIS — E66.9 OBESITY, CLASS I, BMI 30-34.9: ICD-10-CM

## 2019-11-25 DIAGNOSIS — I10 ESSENTIAL HYPERTENSION: ICD-10-CM

## 2019-11-25 DIAGNOSIS — E78.00 HYPERCHOLESTEROLEMIA: ICD-10-CM

## 2019-11-25 DIAGNOSIS — I25.5 ISCHEMIC CARDIOMYOPATHY: ICD-10-CM

## 2019-11-25 DIAGNOSIS — Z79.02 LONG TERM (CURRENT) USE OF ANTITHROMBOTICS/ANTIPLATELETS: ICD-10-CM

## 2019-11-25 DIAGNOSIS — I25.5 ISCHEMIC CARDIOMYOPATHY: Primary | ICD-10-CM

## 2019-11-25 LAB
ALBUMIN SERPL BCP-MCNC: 3.5 G/DL (ref 3.5–5.2)
ALP SERPL-CCNC: 78 U/L (ref 55–135)
ALT SERPL W/O P-5'-P-CCNC: 13 U/L (ref 10–44)
ANION GAP SERPL CALC-SCNC: 6 MMOL/L (ref 8–16)
AST SERPL-CCNC: 21 U/L (ref 10–40)
BILIRUB SERPL-MCNC: 0.3 MG/DL (ref 0.1–1)
BUN SERPL-MCNC: 17 MG/DL (ref 8–23)
CALCIUM SERPL-MCNC: 9.5 MG/DL (ref 8.7–10.5)
CHLORIDE SERPL-SCNC: 102 MMOL/L (ref 95–110)
CHOLEST SERPL-MCNC: 123 MG/DL (ref 120–199)
CHOLEST/HDLC SERPL: 3.5 {RATIO} (ref 2–5)
CO2 SERPL-SCNC: 32 MMOL/L (ref 23–29)
CREAT SERPL-MCNC: 1.3 MG/DL (ref 0.5–1.4)
EST. GFR  (AFRICAN AMERICAN): 43.8 ML/MIN/1.73 M^2
EST. GFR  (NON AFRICAN AMERICAN): 38 ML/MIN/1.73 M^2
GLUCOSE SERPL-MCNC: 135 MG/DL (ref 70–110)
HDLC SERPL-MCNC: 35 MG/DL (ref 40–75)
HDLC SERPL: 28.5 % (ref 20–50)
LDLC SERPL CALC-MCNC: 52.2 MG/DL (ref 63–159)
NONHDLC SERPL-MCNC: 88 MG/DL
POTASSIUM SERPL-SCNC: 4.3 MMOL/L (ref 3.5–5.1)
PROT SERPL-MCNC: 7.4 G/DL (ref 6–8.4)
SODIUM SERPL-SCNC: 140 MMOL/L (ref 136–145)
TRIGL SERPL-MCNC: 179 MG/DL (ref 30–150)

## 2019-11-25 PROCEDURE — 1126F AMNT PAIN NOTED NONE PRSNT: CPT | Mod: S$GLB,,, | Performed by: INTERNAL MEDICINE

## 2019-11-25 PROCEDURE — 36415 COLL VENOUS BLD VENIPUNCTURE: CPT | Mod: PO

## 2019-11-25 PROCEDURE — 1101F PR PT FALLS ASSESS DOC 0-1 FALLS W/OUT INJ PAST YR: ICD-10-PCS | Mod: S$GLB,,, | Performed by: INTERNAL MEDICINE

## 2019-11-25 PROCEDURE — 99999 PR PBB SHADOW E&M-EST. PATIENT-LVL IV: CPT | Mod: PBBFAC,,, | Performed by: INTERNAL MEDICINE

## 2019-11-25 PROCEDURE — 99999 PR PBB SHADOW E&M-EST. PATIENT-LVL IV: ICD-10-PCS | Mod: PBBFAC,,, | Performed by: INTERNAL MEDICINE

## 2019-11-25 PROCEDURE — 1159F MED LIST DOCD IN RCRD: CPT | Mod: S$GLB,,, | Performed by: INTERNAL MEDICINE

## 2019-11-25 PROCEDURE — 99214 PR OFFICE/OUTPT VISIT, EST, LEVL IV, 30-39 MIN: ICD-10-PCS | Mod: S$GLB,,, | Performed by: INTERNAL MEDICINE

## 2019-11-25 PROCEDURE — 80053 COMPREHEN METABOLIC PANEL: CPT

## 2019-11-25 PROCEDURE — 80061 LIPID PANEL: CPT

## 2019-11-25 PROCEDURE — 1159F PR MEDICATION LIST DOCUMENTED IN MEDICAL RECORD: ICD-10-PCS | Mod: S$GLB,,, | Performed by: INTERNAL MEDICINE

## 2019-11-25 PROCEDURE — 3078F PR MOST RECENT DIASTOLIC BLOOD PRESSURE < 80 MM HG: ICD-10-PCS | Mod: S$GLB,,, | Performed by: INTERNAL MEDICINE

## 2019-11-25 PROCEDURE — 1101F PT FALLS ASSESS-DOCD LE1/YR: CPT | Mod: S$GLB,,, | Performed by: INTERNAL MEDICINE

## 2019-11-25 PROCEDURE — 3074F PR MOST RECENT SYSTOLIC BLOOD PRESSURE < 130 MM HG: ICD-10-PCS | Mod: S$GLB,,, | Performed by: INTERNAL MEDICINE

## 2019-11-25 PROCEDURE — 99214 OFFICE O/P EST MOD 30 MIN: CPT | Mod: S$GLB,,, | Performed by: INTERNAL MEDICINE

## 2019-11-25 PROCEDURE — 3074F SYST BP LT 130 MM HG: CPT | Mod: S$GLB,,, | Performed by: INTERNAL MEDICINE

## 2019-11-25 PROCEDURE — 1126F PR PAIN SEVERITY QUANTIFIED, NO PAIN PRESENT: ICD-10-PCS | Mod: S$GLB,,, | Performed by: INTERNAL MEDICINE

## 2019-11-25 PROCEDURE — 3078F DIAST BP <80 MM HG: CPT | Mod: S$GLB,,, | Performed by: INTERNAL MEDICINE

## 2019-11-25 RX ORDER — CLOPIDOGREL BISULFATE 75 MG/1
75 TABLET ORAL DAILY
Qty: 90 TABLET | Refills: 1 | Status: SHIPPED | OUTPATIENT
Start: 2019-11-25 | End: 2020-07-27

## 2019-11-25 RX ORDER — ATORVASTATIN CALCIUM 40 MG/1
40 TABLET, FILM COATED ORAL DAILY
Qty: 90 TABLET | Refills: 1 | Status: SHIPPED | OUTPATIENT
Start: 2019-11-25 | End: 2020-06-08

## 2019-11-25 RX ORDER — LOSARTAN POTASSIUM 25 MG/1
25 TABLET ORAL DAILY
Qty: 90 TABLET | Refills: 1 | Status: SHIPPED | OUTPATIENT
Start: 2019-11-25 | End: 2020-06-08

## 2019-11-25 NOTE — PROGRESS NOTES
Subjective:    Patient ID:  Dolores B LeJeune is a 84 y.o. female who presents for Cardiomyopathy (LABS)        HPI  ONLY LABS HBA1C 6.3%, DOING WELL, NO CHEST PAIN, NO SIGNIFICANT SHORTNESS OF BREATH, NO TIA TYPE SYMPTOMS, NO NEAR-SYNCOPE, SEE ROS    Past Medical History:   Diagnosis Date    Acute MI     Anticoagulant long-term use     Carpal tunnel syndrome     Cataracts, bilateral     Colon polyps     Coronary artery disease     DM type 2 (diabetes mellitus, type 2)     HTN (hypertension)     Hyperlipidemia     Osteoarthritis     Peripheral neuropathy      Past Surgical History:   Procedure Laterality Date    CAROTID STENT      1    COLONOSCOPY  2008  [facility]    A 3 mm by 6 mm polyp in the cecum.  FRAGMENTS OF TUBULAR ADENOMA.    A 1 mm polyp in the recto-sigmoid colon.  HYPERPLASTIC POLYP.    Extremely redundant colon.      CORONARY ARTERY BYPASS GRAFT  2018    [facility], Dr. Echols. LIMA to LAD, SVG to OM1, SVG to OM2, SVG to PDA    EYE SURGERY      eye surgery    HYSTERECTOMY      ovaries remain    ptyregium      TONSILLECTOMY       Family History   Problem Relation Age of Onset    Tremor Mother     Hypertension Mother     Heart disease Mother     Stroke Father      Social History     Socioeconomic History    Marital status:      Spouse name: Not on file    Number of children: 4    Years of education: Not on file    Highest education level: Not on file   Occupational History    Not on file   Social Needs    Financial resource strain: Not on file    Food insecurity:     Worry: Not on file     Inability: Not on file    Transportation needs:     Medical: Not on file     Non-medical: Not on file   Tobacco Use    Smoking status: Former Smoker     Last attempt to quit: 3/15/1972     Years since quittin.7    Smokeless tobacco: Never Used   Substance and Sexual Activity    Alcohol use: No    Drug use: No    Sexual activity: Not on file   Lifestyle    Physical  activity:     Days per week: Not on file     Minutes per session: Not on file    Stress: Not on file   Relationships    Social connections:     Talks on phone: Not on file     Gets together: Not on file     Attends Latter day service: Not on file     Active member of club or organization: Not on file     Attends meetings of clubs or organizations: Not on file     Relationship status: Not on file   Other Topics Concern    Not on file   Social History Narrative    Not on file       Review of patient's allergies indicates:   Allergen Reactions    Albuterol Other (See Comments)     Palpitations/tremor      Sulfa (sulfonamide antibiotics) Rash       Current Outpatient Medications:     ACCU-CHEK SOFTCLIX LANCETS Misc, USE ONE AS DIRECTED TWICE DAILY, Disp: 200 each, Rfl: 10    aspirin (ECOTRIN) 81 MG EC tablet, Take 81 mg by mouth once daily., Disp: , Rfl:     atorvastatin (LIPITOR) 40 MG tablet, Take 1 tablet (40 mg total) by mouth once daily., Disp: 90 tablet, Rfl: 1    biotin 5,000 mcg TbDL, Take by mouth., Disp: , Rfl:     CALCIUM CARBONATE/VITAMIN D3 (CALCIUM 500 WITH D ORAL), Take by mouth.  , Disp: , Rfl:     clopidogrel (PLAVIX) 75 mg tablet, Take 1 tablet (75 mg total) by mouth once daily., Disp: 90 tablet, Rfl: 1    DEXTRAN 70/HYPROMELLOSE (ARTIFICIAL TEARS OPHT), Apply to eye., Disp: , Rfl:     gabapentin (NEURONTIN) 300 MG capsule, TAKE  ONE CAPSULE BY MOUTH DAILY, Disp: 90 capsule, Rfl: 0    glimepiride (AMARYL) 2 MG tablet, TAKE ONE TABLET BY MOUTH DAILY WITH BREAKFAST, Disp: 90 tablet, Rfl: 2    ipratropium (ATROVENT) 42 mcg (0.06 %) nasal spray, INSTILL 2 SPRAYS IN EACH NOSTRIL THREE TIMES DAILY, Disp: 15 mL, Rfl: 4    losartan (COZAAR) 25 MG tablet, Take 1 tablet (25 mg total) by mouth once daily., Disp: 90 tablet, Rfl: 1    meclizine (ANTIVERT) 25 mg tablet, Take 1 tablet by mouth Three times daily as needed., Disp: , Rfl:     metFORMIN (GLUCOPHAGE) 500 MG tablet, TAKE 1/2 TABLET BY  MOUTH TWICE DAILY WITH FOOD, Disp: 90 tablet, Rfl: 0    mupirocin (BACTROBAN) 2 % ointment, Apply topically 3 (three) times daily., Disp: 22 g, Rfl: 0    nitroGLYCERIN (NITROSTAT) 0.4 MG SL tablet, DISSOLVE ONE TABLET UNDER TONGUE EVERY 3 TO 5 MINUTES FOR CHEST PAIN. IF NO RELIEF AFTER 3 DOSES SEEK EMERGENCY COURTNEY, Disp: 25 tablet, Rfl: 0    ONETOUCH DELICA LANCETS 33 gauge Misc, , Disp: , Rfl:     ONETOUCH VERIO Strp, 1 each by NOT APPLICABLE route 2 (two) times daily., Disp: 100 each, Rfl: 11    oxybutynin (DITROPAN-XL) 10 MG 24 hr tablet, Take 1 tablet (10 mg total) by mouth once daily., Disp: 90 tablet, Rfl: 1    RUTIN/HESP/BIOFLAV/C/[id number] (BIOFLEX ORAL), Take 1 tablet by mouth once daily at 6am., Disp: , Rfl:     pantoprazole (PROTONIX) 40 MG tablet, Take 1 tablet (40 mg total) by mouth once daily., Disp: 30 tablet, Rfl: 11    Review of Systems   Constitution: Negative for chills, diaphoresis, fever, malaise/fatigue, night sweats and weight gain (SOME).   HENT: Negative for congestion, hearing loss and nosebleeds.    Eyes: Negative for blurred vision and visual disturbance.   Cardiovascular: Negative for chest pain, claudication, cyanosis, dyspnea on exertion, irregular heartbeat, leg swelling (LLE, MILD), near-syncope, orthopnea, palpitations, paroxysmal nocturnal dyspnea and syncope.   Respiratory: Negative for cough, hemoptysis, shortness of breath and wheezing.    Endocrine: Negative for cold intolerance, heat intolerance and polyuria.   Hematologic/Lymphatic: Negative for adenopathy. Does not bruise/bleed easily.   Skin: Negative for itching and rash.   Musculoskeletal: Negative for back pain, falls and joint pain.   Gastrointestinal: Negative for abdominal pain, change in bowel habit, heartburn, hematemesis, jaundice and melena.   Genitourinary: Negative for dysuria, flank pain and frequency.   Neurological: Negative for brief paralysis, focal weakness, light-headedness, loss of balance,  "numbness, sensory change, tremors and weakness.   Psychiatric/Behavioral: Negative for altered mental status, depression and memory loss.   Allergic/Immunologic: Negative for hives and persistent infections.        Objective:      Vitals:    11/25/19 1308   BP: 107/60   Pulse: 92   Weight: 67.4 kg (148 lb 9.4 oz)   Height: 4' 11" (1.499 m)   PainSc: 0-No pain     Body mass index is 30.01 kg/m².    Physical Exam   Constitutional: She is oriented to person, place, and time. She appears well-developed and well-nourished. She is active.   OVERWEIGHT   HENT:   Head: Normocephalic and atraumatic.   Mouth/Throat: Oropharynx is clear and moist and mucous membranes are normal.   Eyes: Pupils are equal, round, and reactive to light. Conjunctivae and EOM are normal.   Neck: Trachea normal and normal range of motion. Neck supple. Normal carotid pulses, no hepatojugular reflux and no JVD present. Carotid bruit is not present. No edema and no erythema present. No thyromegaly present.   Cardiovascular: Normal rate, regular rhythm and normal heart sounds.  No extrasystoles are present. PMI is not displaced. Exam reveals no gallop and no friction rub.   No murmur heard.  Pulses:       Carotid pulses are 2+ on the right side, and 2+ on the left side.       Radial pulses are 2+ on the right side, and 2+ on the left side.        Femoral pulses are 2+ on the right side, and 2+ on the left side.       Popliteal pulses are 2+ on the right side, and 2+ on the left side.        Dorsalis pedis pulses are 2+ on the right side, and 2+ on the left side.        Posterior tibial pulses are 2+ on the right side, and 2+ on the left side.   Pulmonary/Chest: Effort normal and breath sounds normal. No tachypnea and no bradypnea. She has no wheezes. She has no rales. She exhibits no tenderness.   Abdominal: Soft. Bowel sounds are normal. She exhibits no distension and no mass. There is no hepatosplenomegaly. There is no CVA tenderness. "   Musculoskeletal: Normal range of motion. She exhibits no edema or tenderness.   TRACE TO MILD EDEMA LLE, VARICOSE VEINS   Lymphadenopathy:     She has no cervical adenopathy.   Neurological: She is alert and oriented to person, place, and time. She has normal strength. She displays no tremor. No cranial nerve deficit (MILDLY [place]).   Skin: Skin is warm and dry. No cyanosis or erythema. No pallor.   Psychiatric: She has a normal mood and affect. Her speech is normal and behavior is normal.               ..    Chemistry        Component Value Date/Time     11/25/2019 1353    K 4.3 11/25/2019 1353     11/25/2019 1353    CO2 32 (H) 11/25/2019 1353    BUN 17 11/25/2019 1353    CREATININE 1.3 11/25/2019 1353     (H) 11/25/2019 1353        Component Value Date/Time    CALCIUM 9.5 11/25/2019 1353    ALKPHOS 78 11/25/2019 1353    AST 21 11/25/2019 1353    ALT 13 11/25/2019 1353    BILITOT 0.3 11/25/2019 1353    ESTGFRAFRICA 43.8 (A) 11/25/2019 1353    EGFRNONAA 38.0 (A) 11/25/2019 1353            ..  Lab Results   Component Value Date    CHOL 123 11/25/2019    CHOL 147 05/15/2019    CHOL 122 07/02/2018     Lab Results   Component Value Date    HDL 35 (L) 11/25/2019    HDL 39 (L) 05/15/2019    HDL 31 (L) 07/02/2018     Lab Results   Component Value Date    LDLCALC 52.2 (L) 11/25/2019    LDLCALC 87.6 05/15/2019    LDLCALC 73.4 07/02/2018     Lab Results   Component Value Date    TRIG 179 (H) 11/25/2019    TRIG 102 05/15/2019    TRIG 88 07/02/2018     Lab Results   Component Value Date    CHOLHDL 28.5 11/25/2019    CHOLHDL 26.5 05/15/2019    CHOLHDL 25.4 07/02/2018     ..  Lab Results   Component Value Date    WBC 11.93 02/28/2018    HGB 11.6 (L) 05/15/2019    HCT 31.8 (L) 02/28/2018    MCV 91 02/28/2018     02/28/2018       Test(s) Reviewed  I have reviewed the following in detail:  [] Stress test   [] Angiography   [] Echocardiogram   [x] Labs   [] Other:       Assessment:         ICD-10-CM ICD-9-CM    1. Ischemic cardiomyopathy I25.5 414.8   2. Essential hypertension I10 401.9   3. Long term (current) use of antithrombotics/antiplatelets Z79.02 V58.63   4. Diastolic dysfunction I51.89 429.9   5. Hypercholesterolemia E78.00 272.0   6. Obesity, Class I, BMI 30-34.9 E66.9 278.00     Problem List Items Addressed This Visit        Cardiac/Vascular    Hypertension    Relevant Medications    losartan (COZAAR) 25 MG tablet    Other Relevant Orders    Comprehensive metabolic panel (Completed)    Hypercholesterolemia    Relevant Orders    Comprehensive metabolic panel (Completed)    Lipid panel (Completed)    Ischemic cardiomyopathy - Primary    Relevant Orders    Comprehensive metabolic panel (Completed)    Echo    Diastolic dysfunction    Relevant Orders    Comprehensive metabolic panel (Completed)    Echo       Hematology    Long term (current) use of antithrombotics/antiplatelets    Relevant Orders    Comprehensive metabolic panel (Completed)       Endocrine    Obesity, Class I, BMI 30-34.9           Plan:     LABS SOON, ECHO TO REASSESS EJECTION FRACTION ALL CV CLINICALLY STABLE, NO ANGINA, NO HF, NO TIA, NO CLINICAL ARRHYTHMIA,CONTINUE CURRENT MEDS, EDUCATION, DIET, EXERCISE, WEIGHT LOSS, RETURN TO CLINIC IN 6 MONTHS WITH LABS, DISCUSSED PLAN WITH THE PATIENT AND HER       Ischemic cardiomyopathy  -     Comprehensive metabolic panel; Future; Expected date: 11/25/2019  -     Echo; Future    Essential hypertension  -     Comprehensive metabolic panel; Future; Expected date: 11/25/2019  -     losartan (COZAAR) 25 MG tablet; Take 1 tablet (25 mg total) by mouth once daily.  Dispense: 90 tablet; Refill: 1    Long term (current) use of antithrombotics/antiplatelets  -     Comprehensive metabolic panel; Future; Expected date: 11/25/2019    Diastolic dysfunction  -     Comprehensive metabolic panel; Future; Expected date: 11/25/2019  -     Echo; Future    Hypercholesterolemia  -     Comprehensive metabolic panel;  Future; Expected date: 11/25/2019  -     Lipid panel; Future; Expected date: 11/25/2019    Obesity, Class I, BMI 30-34.9    Other orders  -     clopidogrel (PLAVIX) 75 mg tablet; Take 1 tablet (75 mg total) by mouth once daily.  Dispense: 90 tablet; Refill: 1  -     atorvastatin (LIPITOR) 40 MG tablet; Take 1 tablet (40 mg total) by mouth once daily.  Dispense: 90 tablet; Refill: 1    RTC Low level/low impact aerobic exercise 5x's/wk. Heart healthy diet and risk factor modification.    See labs and med orders.    Aerobic exercise 5x's/wk. Heart healthy diet and risk factor modification.    See labs and med orders.

## 2019-12-14 DIAGNOSIS — N39.3 SUI (STRESS URINARY INCONTINENCE, FEMALE): ICD-10-CM

## 2019-12-16 RX ORDER — OXYBUTYNIN CHLORIDE 10 MG/1
TABLET, EXTENDED RELEASE ORAL
Qty: 90 TABLET | Refills: 1 | Status: SHIPPED | OUTPATIENT
Start: 2019-12-16 | End: 2020-06-17

## 2020-01-02 ENCOUNTER — CLINICAL SUPPORT (OUTPATIENT)
Dept: CARDIOLOGY | Facility: CLINIC | Age: 85
End: 2020-01-02
Attending: INTERNAL MEDICINE
Payer: MEDICARE

## 2020-01-02 VITALS — WEIGHT: 148 LBS | HEIGHT: 59 IN | BODY MASS INDEX: 29.84 KG/M2 | HEART RATE: 75 BPM

## 2020-01-02 DIAGNOSIS — I51.89 DIASTOLIC DYSFUNCTION: ICD-10-CM

## 2020-01-02 DIAGNOSIS — I25.5 ISCHEMIC CARDIOMYOPATHY: ICD-10-CM

## 2020-01-02 LAB
ASCENDING AORTA: 2.63 CM
AV INDEX (PROSTH): 0.66
AV MEAN GRADIENT: 5 MMHG
AV PEAK GRADIENT: 9 MMHG
AV VALVE AREA: 2.09 CM2
AV VELOCITY RATIO: 0.64
BSA FOR ECHO PROCEDURE: 1.67 M2
CV ECHO LV RWT: 0.38 CM
DOP CALC AO PEAK VEL: 1.5 M/S
DOP CALC AO VTI: 35.99 CM
DOP CALC LVOT AREA: 3.2 CM2
DOP CALC LVOT DIAMETER: 2.01 CM
DOP CALC LVOT PEAK VEL: 0.96 M/S
DOP CALC LVOT STROKE VOLUME: 75.23 CM3
DOP CALCLVOT PEAK VEL VTI: 23.72 CM
E WAVE DECELERATION TIME: 193.23 MSEC
E/A RATIO: 1.03
E/E' RATIO: 12.59 M/S
ECHO LV POSTERIOR WALL: 0.82 CM (ref 0.6–1.1)
FRACTIONAL SHORTENING: 24 % (ref 28–44)
INTERVENTRICULAR SEPTUM: 0.84 CM (ref 0.6–1.1)
IVRT: 0.08 MSEC
LA MAJOR: 4.08 CM
LA MINOR: 4.38 CM
LA WIDTH: 2.96 CM
LEFT ATRIUM SIZE: 3.92 CM
LEFT ATRIUM VOLUME INDEX: 25.7 ML/M2
LEFT ATRIUM VOLUME: 41.67 CM3
LEFT INTERNAL DIMENSION IN SYSTOLE: 3.24 CM (ref 2.1–4)
LEFT VENTRICLE DIASTOLIC VOLUME INDEX: 50.98 ML/M2
LEFT VENTRICLE DIASTOLIC VOLUME: 82.71 ML
LEFT VENTRICLE MASS INDEX: 68 G/M2
LEFT VENTRICLE SYSTOLIC VOLUME INDEX: 26 ML/M2
LEFT VENTRICLE SYSTOLIC VOLUME: 42.16 ML
LEFT VENTRICULAR INTERNAL DIMENSION IN DIASTOLE: 4.29 CM (ref 3.5–6)
LEFT VENTRICULAR MASS: 110.17 G
LV LATERAL E/E' RATIO: 9.73 M/S
LV SEPTAL E/E' RATIO: 17.83 M/S
MV PEAK A VEL: 1.04 M/S
MV PEAK E VEL: 1.07 M/S
PISA TR MAX VEL: 2.52 M/S
PULM VEIN S/D RATIO: 1.21
PV PEAK D VEL: 0.53 M/S
PV PEAK S VEL: 0.64 M/S
RA MAJOR: 4.02 CM
RA PRESSURE: 3 MMHG
RA WIDTH: 3.47 CM
RIGHT VENTRICULAR END-DIASTOLIC DIMENSION: 3.13 CM
SINUS: 2.62 CM
STJ: 2.43 CM
TDI LATERAL: 0.11 M/S
TDI SEPTAL: 0.06 M/S
TDI: 0.09 M/S
TR MAX PG: 25 MMHG
TRICUSPID ANNULAR PLANE SYSTOLIC EXCURSION: 1.78 CM
TV REST PULMONARY ARTERY PRESSURE: 28 MMHG

## 2020-01-02 PROCEDURE — 99999 PR PBB SHADOW E&M-EST. PATIENT-LVL I: CPT | Mod: PBBFAC,,,

## 2020-01-02 PROCEDURE — 93306 ECHO (CUPID ONLY): ICD-10-PCS | Mod: S$GLB,,, | Performed by: INTERNAL MEDICINE

## 2020-01-02 PROCEDURE — 93306 TTE W/DOPPLER COMPLETE: CPT | Mod: S$GLB,,, | Performed by: INTERNAL MEDICINE

## 2020-01-02 PROCEDURE — 99999 PR PBB SHADOW E&M-EST. PATIENT-LVL I: ICD-10-PCS | Mod: PBBFAC,,,

## 2020-02-03 RX ORDER — GABAPENTIN 300 MG/1
CAPSULE ORAL
Qty: 90 CAPSULE | Refills: 0 | Status: SHIPPED | OUTPATIENT
Start: 2020-02-03 | End: 2020-04-28 | Stop reason: SDUPTHER

## 2020-02-03 NOTE — PROGRESS NOTES
Refill Routing Note     Medication(s) are appropriate for refill:    Medication Outside of Protocol    Appointments  past 15m or future 3m with PCP    Date Provider   Last Visit   11/12/2019 BEBO Soto MD   Next Visit   5/14/2020 BEBO Soto MD       Automatic Epic Protocol Generated Data:    Requested Prescriptions   Pending Prescriptions Disp Refills    gabapentin (NEURONTIN) 300 MG capsule [Pharmacy Med Name: GABAPENTIN 300 MG        CAP] 90 capsule 0     Sig: TAKE ONE CAPSULE BY MOUTH DAILY       Anticonvulsants Protocol Passed - 2/1/2020 10:52 AM        Passed - Visit with Authorizing provider in past 9 months or upcoming 90 days        Passed - No active pregnancy on record           Note created:02/03/2020

## 2020-02-04 DIAGNOSIS — J30.1 SEASONAL ALLERGIC RHINITIS DUE TO POLLEN: ICD-10-CM

## 2020-02-06 RX ORDER — IPRATROPIUM BROMIDE 42 UG/1
SPRAY, METERED NASAL
Qty: 15 ML | Refills: 3 | Status: SHIPPED | OUTPATIENT
Start: 2020-02-06 | End: 2020-12-04

## 2020-02-06 NOTE — TELEPHONE ENCOUNTER
Refill Routing Note     Medication(s) are not appropriate for processing by [facility]:    Medication Outside of Protocol    Appointments  past 12m or future 3m with PCP    Date Provider   Last Visit   11/12/2019 BEBO Soto MD   Next Visit   5/14/2020 BEBO Soto MD           Automatic Epic Protocol Generated Data:    Requested Prescriptions   Pending Prescriptions Disp Refills    ipratropium (ATROVENT) 42 mcg (0.06 %) nasal spray [Pharmacy Med Name: IPRATROPIUM NS 0.06 %    SPR] 15 mL 3     Sig: INSTILL 2 SPRAYS IN EACH NOSTRIL THREE TIMES DAILY       Off-Protocol Failed - 2/4/2020  6:05 AM        Failed - Medication not assigned to a protocol, review manually.        Passed - Office visit in past 12 months or future 90 days.     Recent Outpatient Visits            2 months ago Ischemic cardiomyopathy    [facility] Shayla Galvez MD    2 months ago Hypertension associated with diabetes    [facility] Rashawn Soto MD    8 months ago Hypertension associated with diabetes    [facility] Rashawn Soto MD    8 months ago Ischemic cardiomyopathy    [facility] Shayla Galvez MD    1 year ago Controlled type 2 diabetes mellitus without complication, without long-term current use of insulin    [facility] Rashawn Soto MD          Future Appointments              In 3 months BEBO Soto MD [facility], [place]    In 3 months [facility]                   Note composed:11:00 AM 02/06/2020

## 2020-02-12 DIAGNOSIS — I15.2 HYPERTENSION ASSOCIATED WITH DIABETES: ICD-10-CM

## 2020-02-12 DIAGNOSIS — E11.69 TYPE 2 DIABETES MELLITUS WITH HYPERLIPIDEMIA: ICD-10-CM

## 2020-02-12 DIAGNOSIS — E78.5 TYPE 2 DIABETES MELLITUS WITH HYPERLIPIDEMIA: ICD-10-CM

## 2020-02-12 DIAGNOSIS — E11.59 HYPERTENSION ASSOCIATED WITH DIABETES: ICD-10-CM

## 2020-02-14 RX ORDER — METFORMIN HYDROCHLORIDE 500 MG/1
TABLET ORAL
Qty: 90 TABLET | Refills: 0 | Status: SHIPPED | OUTPATIENT
Start: 2020-02-14 | End: 2020-05-18

## 2020-02-14 NOTE — PROGRESS NOTES
Refill Authorization Note    Mrs.LeJeune is requesting a refill authorization.    Brief assessment and rationale for refill: APPROVE: prr                                         Comments:   Requested Prescriptions   Pending Prescriptions Disp Refills    metFORMIN (GLUCOPHAGE) 500 MG tablet [Pharmacy Med Name: METFORMIN 500 MG         TAB] 90 tablet 0     Sig: TAKE 1/2 TABLET BY MOUTH TWICE DAILY WITH FOOD       Endocrinology:  Diabetes - Biguanides Passed - 2/12/2020  9:09 AM        Passed - Patient is at least 18 years old        Passed - Office visit in past 12 months or future 90 days.     Recent Outpatient Visits            2 months ago Ischemic cardiomyopathy    [facility] Shayla Galvez MD    3 months ago Hypertension associated with diabetes    [facility]    8 months ago Hypertension associated with diabetes    [facility] BEBO Soto MD    9 months ago Ischemic cardiomyopathy    [facility] Shayla Galvez MD    1 year ago Controlled type 2 diabetes mellitus without complication, without long-term current use of insulin    [facility] BEBO Soto MD          Future Appointments              In 3 months BEBO Soto MD [facility]    In 3 months Shayla Galvez MD [facility]                Passed - Cr is 1.3 or below and within 360 days     Creatinine   Date Value Ref Range Status   11/25/2019 1.3 0.5 - 1.4 mg/dL Final   05/15/2019 1.3 0.5 - 1.4 mg/dL Final   11/12/2018 1.1 0.5 - 1.4 mg/dL Final              Passed - HBA1C is 7.9 or below and within 180 days     Hemoglobin A1C   Date Value Ref Range Status   11/12/2019 6.3 (H) 4.0 - 5.6 % Final     Comment:     ADA Screening Guidelines:  5.7-6.4%  Consistent with prediabetes  >or=6.5%  Consistent with diabetes  High levels of fetal hemoglobin interfere with the HbA1C  assay. Heterozygous hemoglobin variants  (HbS, HgC, etc)do  not significantly interfere with this assay.   However, presence of multiple variants may affect accuracy.     05/28/2019 6.5 (H) 4.0 - 5.6 % Final     Comment:     ADA Screening Guidelines:  5.7-6.4%  Consistent with prediabetes  >or=6.5%  Consistent with diabetes  High levels of fetal hemoglobin interfere with the HbA1C  assay. Heterozygous hemoglobin variants (HbS, HgC, etc)do  not significantly interfere with this assay.   However, presence of multiple variants may affect accuracy.     11/28/2018 6.2 (H) 4.0 - 5.6 % Final     Comment:     ADA Screening Guidelines:  5.7-6.4%  Consistent with prediabetes  >or=6.5%  Consistent with diabetes  High levels of fetal hemoglobin interfere with the HbA1C  assay. Heterozygous hemoglobin variants (HbS, HgC, etc)do  not significantly interfere with this assay.   However, presence of multiple variants may affect accuracy.                Passed - eGFR is 30 or above and within 360 days     eGFR if non    Date Value Ref Range Status   11/25/2019 38.0 (A) >60 mL/min/1.73 m^2 Final     Comment:     Calculation used to obtain the estimated glomerular filtration  rate (eGFR) is the CKD-EPI equation.      05/15/2019 38.3 (A) >60 mL/min/1.73 m^2 Final     Comment:     Calculation used to obtain the estimated glomerular filtration  rate (eGFR) is the CKD-EPI equation.      11/12/2018 46.9 (A) >60 mL/min/1.73 m^2 Final     Comment:     Calculation used to obtain the estimated glomerular filtration  rate (eGFR) is the CKD-EPI equation.        eGFR if    Date Value Ref Range Status   11/25/2019 43.8 (A) >60 mL/min/1.73 m^2 Final   05/15/2019 44.1 (A) >60 mL/min/1.73 m^2 Final   11/12/2018 54.0 (A) >60 mL/min/1.73 m^2 Final               Appointments  past 12m or future 3m with PCP    Date Provider   Last Visit   11/12/2019 BEBO oSto MD   Next Visit   5/14/2020 BEBO Soto MD   .  ED visits in past 90 days: 0       Note  composed:2:20 PM 02/14/2020

## 2020-02-15 DIAGNOSIS — E11.69 TYPE 2 DIABETES MELLITUS WITH HYPERLIPIDEMIA: ICD-10-CM

## 2020-02-15 DIAGNOSIS — E78.5 TYPE 2 DIABETES MELLITUS WITH HYPERLIPIDEMIA: ICD-10-CM

## 2020-02-15 DIAGNOSIS — I15.2 HYPERTENSION ASSOCIATED WITH DIABETES: ICD-10-CM

## 2020-02-15 DIAGNOSIS — E11.59 HYPERTENSION ASSOCIATED WITH DIABETES: ICD-10-CM

## 2020-02-19 RX ORDER — GLIMEPIRIDE 2 MG/1
TABLET ORAL
Qty: 90 TABLET | Refills: 0 | Status: SHIPPED | OUTPATIENT
Start: 2020-02-19 | End: 2020-05-18

## 2020-02-19 NOTE — PROGRESS NOTES
Refill Authorization Note    Mrs.LeJeune is requesting a refill authorization.    Brief assessment and rationale for refill: APPROVE: prr               Medication reconciliation completed: No                         Comments:   Requested Prescriptions   Pending Prescriptions Disp Refills    glimepiride (AMARYL) 2 MG tablet [Pharmacy Med Name: GLIMEPIRIDE 2 MG         TAB] 90 tablet 0     Sig: TAKE ONE TABLET BY MOUTH DAILY WITH BREAKFAST       Endocrinology:  Diabetes - Sulfonylureas Passed - 2/15/2020 12:09 PM        Passed - Patient is at least 18 years old        Passed - Office visit in past 12 months or future 90 days.     Recent Outpatient Visits            2 months ago Ischemic cardiomyopathy    [facility] Shayla Galvez MD    3 months ago Hypertension associated with diabetes    [facility]    8 months ago Hypertension associated with diabetes    [facility]    9 months ago Ischemic cardiomyopathy    [facility] Shayla Galvez MD    1 year ago Controlled type 2 diabetes mellitus without complication, without long-term current use of insulin    [facility] BEBO Soto MD          Future Appointments              In 2 months BEBO Soto MD [facility]    In 3 months Shayla Galvez MD [facility]                Passed - HBA1C is 7.9 or below and within 180 days     Hemoglobin A1C   Date Value Ref Range Status   11/12/2019 6.3 (H) 4.0 - 5.6 % Final     Comment:     ADA Screening Guidelines:  5.7-6.4%  Consistent with prediabetes  >or=6.5%  Consistent with diabetes  High levels of fetal hemoglobin interfere with the HbA1C  assay. Heterozygous hemoglobin variants (HbS, HgC, etc)do  not significantly interfere with this assay.   However, presence of multiple variants may affect accuracy.     05/28/2019 6.5 (H) 4.0 - 5.6 % Final     Comment:     ADA  Screening Guidelines:  5.7-6.4%  Consistent with prediabetes  >or=6.5%  Consistent with diabetes  High levels of fetal hemoglobin interfere with the HbA1C  assay. Heterozygous hemoglobin variants (HbS, HgC, etc)do  not significantly interfere with this assay.   However, presence of multiple variants may affect accuracy.     11/28/2018 6.2 (H) 4.0 - 5.6 % Final     Comment:     ADA Screening Guidelines:  5.7-6.4%  Consistent with prediabetes  >or=6.5%  Consistent with diabetes  High levels of fetal hemoglobin interfere with the HbA1C  assay. Heterozygous hemoglobin variants (HbS, HgC, etc)do  not significantly interfere with this assay.   However, presence of multiple variants may affect accuracy.                Passed - Cr is 1.3 or below and within 360 days     Creatinine   Date Value Ref Range Status   11/25/2019 1.3 0.5 - 1.4 mg/dL Final   05/15/2019 1.3 0.5 - 1.4 mg/dL Final   11/12/2018 1.1 0.5 - 1.4 mg/dL Final              Passed - eGFR is 30 or above and within 360 days     eGFR if non    Date Value Ref Range Status   11/25/2019 38.0 (A) >60 mL/min/1.73 m^2 Final     Comment:     Calculation used to obtain the estimated glomerular filtration  rate (eGFR) is the CKD-EPI equation.      05/15/2019 38.3 (A) >60 mL/min/1.73 m^2 Final     Comment:     Calculation used to obtain the estimated glomerular filtration  rate (eGFR) is the CKD-EPI equation.      11/12/2018 46.9 (A) >60 mL/min/1.73 m^2 Final     Comment:     Calculation used to obtain the estimated glomerular filtration  rate (eGFR) is the CKD-EPI equation.        eGFR if    Date Value Ref Range Status   11/25/2019 43.8 (A) >60 mL/min/1.73 m^2 Final   05/15/2019 44.1 (A) >60 mL/min/1.73 m^2 Final   11/12/2018 54.0 (A) >60 mL/min/1.73 m^2 Final               Appointments  past 12m or future 3m with PCP    Date Provider   Last Visit   11/12/2019 BEBO Soto MD   Next Visit   5/14/2020 BEBO Soto MD   .  ED visits in  past 90 days: 0       Note composed:8:56 AM 02/19/2020

## 2020-04-28 RX ORDER — GABAPENTIN 300 MG/1
CAPSULE ORAL
Qty: 90 CAPSULE | Refills: 0 | Status: SHIPPED | OUTPATIENT
Start: 2020-04-28 | End: 2020-07-27 | Stop reason: SDUPTHER

## 2020-04-28 NOTE — PROGRESS NOTES
Refill Routing Note   Medication(s) are not appropriate for processing by [facility]:       Outside of protocol               Medication reconciliation completed: No          Note composed:1:05 PM 04/28/2020        Automatic Epic Protocol Generated Data:    Requested Prescriptions   Pending Prescriptions Disp Refills    gabapentin (NEURONTIN) 300 MG capsule [Pharmacy Med Name: GABAPENTIN 300 MG        CAP] 90 capsule 0     Sig: TAKE ONE CAPSULE BY MOUTH DAILY       Anticonvulsants Protocol Passed - 4/28/2020 11:25 AM        Passed - Visit with Authorizing provider in past 9 months or upcoming 90 days        Passed - No active pregnancy on record           Appointments  past 12m or future 3m with PCP    Date Provider   Last Visit   11/12/2019 BEBO Soto MD   Next Visit   5/14/2020 BEBO Soto MD   ED visits in past 90 days: 0     Note composed:1:05 PM 04/28/2020

## 2020-05-05 ENCOUNTER — PATIENT MESSAGE (OUTPATIENT)
Dept: ADMINISTRATIVE | Facility: HOSPITAL | Age: 85
End: 2020-05-05

## 2020-05-15 DIAGNOSIS — I15.2 HYPERTENSION ASSOCIATED WITH DIABETES: ICD-10-CM

## 2020-05-15 DIAGNOSIS — E11.69 TYPE 2 DIABETES MELLITUS WITH HYPERLIPIDEMIA: ICD-10-CM

## 2020-05-15 DIAGNOSIS — E78.5 TYPE 2 DIABETES MELLITUS WITH HYPERLIPIDEMIA: ICD-10-CM

## 2020-05-15 DIAGNOSIS — E11.59 HYPERTENSION ASSOCIATED WITH DIABETES: ICD-10-CM

## 2020-05-18 RX ORDER — GLIMEPIRIDE 2 MG/1
TABLET ORAL
Qty: 90 TABLET | Refills: 0 | Status: SHIPPED | OUTPATIENT
Start: 2020-05-18 | End: 2020-07-16

## 2020-05-18 RX ORDER — METFORMIN HYDROCHLORIDE 500 MG/1
TABLET ORAL
Qty: 90 TABLET | Refills: 0 | Status: SHIPPED | OUTPATIENT
Start: 2020-05-18 | End: 2020-08-14

## 2020-05-18 NOTE — TELEPHONE ENCOUNTER
Please schedule patient for Labs (A1C)    Please also check with your provider if any further labs need to be added and scheduled together.    Thanks!  [facility]     Note composed: 05/18/2020 2:47 PM

## 2020-05-18 NOTE — TELEPHONE ENCOUNTER
Refill Authorization Note   Mrs.LeJeune is requesting a refill authorization.    Brief assessment and rationale for refill: APPROVE: needs labs     Medication-related problems identified: Requires labs    Medication Therapy Plan: NTBO(A1C)    Medication reconciliation completed: No                         Comments:      Requested Prescriptions   Signed Prescriptions Disp Refills    metFORMIN (GLUCOPHAGE) 500 MG tablet 90 tablet 0     Sig: TAKE 1/2 TABLET BY MOUTH TWICE DAILY WITH FOOD       Endocrinology:  Diabetes - Biguanides Failed - 5/15/2020 11:22 AM        Failed - HBA1C is 7.9 or below and within 180 days     Hemoglobin A1C   Date Value Ref Range Status   11/12/2019 6.3 (H) 4.0 - 5.6 % Final     Comment:     ADA Screening Guidelines:  5.7-6.4%  Consistent with prediabetes  >or=6.5%  Consistent with diabetes  High levels of fetal hemoglobin interfere with the HbA1C  assay. Heterozygous hemoglobin variants (HbS, HgC, etc)do  not significantly interfere with this assay.   However, presence of multiple variants may affect accuracy.     05/28/2019 6.5 (H) 4.0 - 5.6 % Final     Comment:     ADA Screening Guidelines:  5.7-6.4%  Consistent with prediabetes  >or=6.5%  Consistent with diabetes  High levels of fetal hemoglobin interfere with the HbA1C  assay. Heterozygous hemoglobin variants (HbS, HgC, etc)do  not significantly interfere with this assay.   However, presence of multiple variants may affect accuracy.     11/28/2018 6.2 (H) 4.0 - 5.6 % Final     Comment:     ADA Screening Guidelines:  5.7-6.4%  Consistent with prediabetes  >or=6.5%  Consistent with diabetes  High levels of fetal hemoglobin interfere with the HbA1C  assay. Heterozygous hemoglobin variants (HbS, HgC, etc)do  not significantly interfere with this assay.   However, presence of multiple variants may affect accuracy.                Passed - Patient is at least 18 years old        Passed - Office visit in past 12 months or future 90 days.      Recent Outpatient Visits            5 months ago Ischemic cardiomyopathy    [facility] Shayla Galvez MD    6 months ago Hypertension associated with diabetes    [facility] BEBO Soto MD    11 months ago Hypertension associated with diabetes    [facility]    1 year ago Ischemic cardiomyopathy    [facility] Shayla Galvez MD    1 year ago Controlled type 2 diabetes mellitus without complication, without long-term current use of insulin    [facility] BEBO Soto MD          Future Appointments              In 1 month BEBO Soto MD [facility]    In 2 months Shayla Galvez MD [facility]                Passed - Cr is 1.3 or below and within 360 days     Creatinine   Date Value Ref Range Status   11/25/2019 1.3 0.5 - 1.4 mg/dL Final   05/15/2019 1.3 0.5 - 1.4 mg/dL Final   11/12/2018 1.1 0.5 - 1.4 mg/dL Final              Passed - eGFR is 30 or above and within 360 days     eGFR if non    Date Value Ref Range Status   11/25/2019 38.0 (A) >60 mL/min/1.73 m^2 Final     Comment:     Calculation used to obtain the estimated glomerular filtration  rate (eGFR) is the CKD-EPI equation.      05/15/2019 38.3 (A) >60 mL/min/1.73 m^2 Final     Comment:     Calculation used to obtain the estimated glomerular filtration  rate (eGFR) is the CKD-EPI equation.      11/12/2018 46.9 (A) >60 mL/min/1.73 m^2 Final     Comment:     Calculation used to obtain the estimated glomerular filtration  rate (eGFR) is the CKD-EPI equation.        eGFR if    Date Value Ref Range Status   11/25/2019 43.8 (A) >60 mL/min/1.73 m^2 Final   05/15/2019 44.1 (A) >60 mL/min/1.73 m^2 Final   11/12/2018 54.0 (A) >60 mL/min/1.73 m^2 Final             glimepiride (AMARYL) 2 MG tablet 90 tablet 0     Sig: TAKE ONE TABLET BY MOUTH DAILY WITH BREAKFAST       Endocrinology:   Diabetes - Sulfonylureas Failed - 5/15/2020 11:22 AM        Failed - HBA1C is 7.9 or below and within 180 days     Hemoglobin A1C   Date Value Ref Range Status   11/12/2019 6.3 (H) 4.0 - 5.6 % Final     Comment:     ADA Screening Guidelines:  5.7-6.4%  Consistent with prediabetes  >or=6.5%  Consistent with diabetes  High levels of fetal hemoglobin interfere with the HbA1C  assay. Heterozygous hemoglobin variants (HbS, HgC, etc)do  not significantly interfere with this assay.   However, presence of multiple variants may affect accuracy.     05/28/2019 6.5 (H) 4.0 - 5.6 % Final     Comment:     ADA Screening Guidelines:  5.7-6.4%  Consistent with prediabetes  >or=6.5%  Consistent with diabetes  High levels of fetal hemoglobin interfere with the HbA1C  assay. Heterozygous hemoglobin variants (HbS, HgC, etc)do  not significantly interfere with this assay.   However, presence of multiple variants may affect accuracy.     11/28/2018 6.2 (H) 4.0 - 5.6 % Final     Comment:     ADA Screening Guidelines:  5.7-6.4%  Consistent with prediabetes  >or=6.5%  Consistent with diabetes  High levels of fetal hemoglobin interfere with the HbA1C  assay. Heterozygous hemoglobin variants (HbS, HgC, etc)do  not significantly interfere with this assay.   However, presence of multiple variants may affect accuracy.                Passed - Patient is at least 18 years old        Passed - Office visit in past 12 months or future 90 days.     Recent Outpatient Visits            5 months ago Ischemic cardiomyopathy    [place] [facility] Shayla Galvez MD    6 months ago Hypertension associated with diabetes    [facility] BEBO Soto MD    11 months ago Hypertension associated with diabetes    [facility] BEBO Soto MD    1 year ago Ischemic cardiomyopathy    [place] [facility] Shayla Galvez MD    1 year ago Controlled type 2 diabetes mellitus without complication, without long-term current use of  insulin    [facility] BEBO Soto MD          Future Appointments              In 1 month BEBO Soto MD [facility] [place]    In 2 months [facility]                Passed - Cr is 1.3 or below and within 360 days     Creatinine   Date Value Ref Range Status   11/25/2019 1.3 0.5 - 1.4 mg/dL Final   05/15/2019 1.3 0.5 - 1.4 mg/dL Final   11/12/2018 1.1 0.5 - 1.4 mg/dL Final              Passed - eGFR is 30 or above and within 360 days     eGFR if non    Date Value Ref Range Status   11/25/2019 38.0 (A) >60 mL/min/1.73 m^2 Final     Comment:     Calculation used to obtain the estimated glomerular filtration  rate (eGFR) is the CKD-EPI equation.      05/15/2019 38.3 (A) >60 mL/min/1.73 m^2 Final     Comment:     Calculation used to obtain the estimated glomerular filtration  rate (eGFR) is the CKD-EPI equation.      11/12/2018 46.9 (A) >60 mL/min/1.73 m^2 Final     Comment:     Calculation used to obtain the estimated glomerular filtration  rate (eGFR) is the CKD-EPI equation.        eGFR if    Date Value Ref Range Status   11/25/2019 43.8 (A) >60 mL/min/1.73 m^2 Final   05/15/2019 44.1 (A) >60 mL/min/1.73 m^2 Final   11/12/2018 54.0 (A) >60 mL/min/1.73 m^2 Final               Appointments  past 12m or future 3m with PCP    Date Provider   Last Visit   11/12/2019 BEBO Soto MD   Next Visit   7/16/2020 BEBO Soto MD   ED visits in past 90 days: [unfilled]     Note composed:2:47 PM 05/18/2020

## 2020-05-21 ENCOUNTER — PATIENT MESSAGE (OUTPATIENT)
Dept: FAMILY MEDICINE | Facility: CLINIC | Age: 85
End: 2020-05-21

## 2020-05-21 ENCOUNTER — TELEPHONE (OUTPATIENT)
Dept: FAMILY MEDICINE | Facility: CLINIC | Age: 85
End: 2020-05-21

## 2020-05-21 NOTE — TELEPHONE ENCOUNTER
----- Message from Zully Winter sent at 5/21/2020 10:59 AM CDT -----  Contact: Patient  Type:  Patient Returning Call    Who Called:  Patient  Who Left Message for Patient:  Oly  Does the patient know what this is regarding?:  yes  Best Call Back Number:    Additional Information:  Call to pod, no answer.

## 2020-06-16 DIAGNOSIS — N39.3 SUI (STRESS URINARY INCONTINENCE, FEMALE): ICD-10-CM

## 2020-06-17 RX ORDER — OXYBUTYNIN CHLORIDE 10 MG/1
TABLET, EXTENDED RELEASE ORAL
Qty: 90 TABLET | Refills: 0 | Status: SHIPPED | OUTPATIENT
Start: 2020-06-17 | End: 2020-09-16

## 2020-06-17 NOTE — PROGRESS NOTES
Refill Routing Note     Medication(s) are not appropriate for processing by [facility]:    Medication Outside of Protocol    Appointments  past 12m or future 3m with PCP    Date Provider   Last Visit   11/12/2019 BEBO Soto MD   Next Visit   7/16/2020 BEBO Soto MD           Automatic Epic Protocol Generated Data:    Requested Prescriptions   Pending Prescriptions Disp Refills    oxybutynin (DITROPAN-XL) 10 MG 24 hr tablet [Pharmacy Med Name: OXYBUTYNIN ER 10 MG      TAB] 90 tablet 0     Sig: TAKE  ONE TABLET BY MOUTH DAILY       There is no refill protocol information for this order           Note composed:7:27 AM 06/17/2020

## 2020-07-14 ENCOUNTER — LAB VISIT (OUTPATIENT)
Dept: LAB | Facility: HOSPITAL | Age: 85
End: 2020-07-14
Attending: FAMILY MEDICINE
Payer: MEDICARE

## 2020-07-14 DIAGNOSIS — E11.69 TYPE 2 DIABETES MELLITUS WITH HYPERLIPIDEMIA: ICD-10-CM

## 2020-07-14 DIAGNOSIS — E78.5 TYPE 2 DIABETES MELLITUS WITH HYPERLIPIDEMIA: ICD-10-CM

## 2020-07-14 LAB
ESTIMATED AVG GLUCOSE: 128 MG/DL (ref 68–131)
HBA1C MFR BLD HPLC: 6.1 % (ref 4–5.6)

## 2020-07-14 PROCEDURE — 83036 HEMOGLOBIN GLYCOSYLATED A1C: CPT

## 2020-07-14 PROCEDURE — 36415 COLL VENOUS BLD VENIPUNCTURE: CPT | Mod: PO

## 2020-07-16 ENCOUNTER — OFFICE VISIT (OUTPATIENT)
Dept: FAMILY MEDICINE | Facility: CLINIC | Age: 85
End: 2020-07-16
Payer: MEDICARE

## 2020-07-16 VITALS
OXYGEN SATURATION: 97 % | BODY MASS INDEX: 29.95 KG/M2 | SYSTOLIC BLOOD PRESSURE: 138 MMHG | TEMPERATURE: 99 F | WEIGHT: 148.56 LBS | HEART RATE: 97 BPM | DIASTOLIC BLOOD PRESSURE: 82 MMHG | HEIGHT: 59 IN

## 2020-07-16 DIAGNOSIS — N28.9 RENAL INSUFFICIENCY: ICD-10-CM

## 2020-07-16 DIAGNOSIS — E78.5 TYPE 2 DIABETES MELLITUS WITH HYPERLIPIDEMIA: Primary | ICD-10-CM

## 2020-07-16 DIAGNOSIS — E11.59 HYPERTENSION ASSOCIATED WITH DIABETES: ICD-10-CM

## 2020-07-16 DIAGNOSIS — E11.69 TYPE 2 DIABETES MELLITUS WITH HYPERLIPIDEMIA: Primary | ICD-10-CM

## 2020-07-16 DIAGNOSIS — R82.998 DARK URINE: ICD-10-CM

## 2020-07-16 DIAGNOSIS — I15.2 HYPERTENSION ASSOCIATED WITH DIABETES: ICD-10-CM

## 2020-07-16 DIAGNOSIS — E78.00 HYPERCHOLESTEROLEMIA: ICD-10-CM

## 2020-07-16 LAB
BILIRUB UR QL STRIP: NEGATIVE
CLARITY UR: CLEAR
COLOR UR: YELLOW
GLUCOSE UR QL STRIP: NEGATIVE
HGB UR QL STRIP: NEGATIVE
KETONES UR QL STRIP: NEGATIVE
LEUKOCYTE ESTERASE UR QL STRIP: NEGATIVE
NITRITE UR QL STRIP: NEGATIVE
PH UR STRIP: 6 [PH] (ref 5–8)
PROT UR QL STRIP: NEGATIVE
SP GR UR STRIP: 1.02 (ref 1–1.03)
URN SPEC COLLECT METH UR: NORMAL

## 2020-07-16 PROCEDURE — 3079F PR MOST RECENT DIASTOLIC BLOOD PRESSURE 80-89 MM HG: ICD-10-PCS | Mod: S$GLB,,, | Performed by: FAMILY MEDICINE

## 2020-07-16 PROCEDURE — 81003 URINALYSIS AUTO W/O SCOPE: CPT | Mod: PO

## 2020-07-16 PROCEDURE — 99999 PR PBB SHADOW E&M-EST. PATIENT-LVL IV: ICD-10-PCS | Mod: PBBFAC,,, | Performed by: FAMILY MEDICINE

## 2020-07-16 PROCEDURE — 99214 PR OFFICE/OUTPT VISIT, EST, LEVL IV, 30-39 MIN: ICD-10-PCS | Mod: S$GLB,,, | Performed by: FAMILY MEDICINE

## 2020-07-16 PROCEDURE — 1126F PR PAIN SEVERITY QUANTIFIED, NO PAIN PRESENT: ICD-10-PCS | Mod: S$GLB,,, | Performed by: FAMILY MEDICINE

## 2020-07-16 PROCEDURE — 1101F PT FALLS ASSESS-DOCD LE1/YR: CPT | Mod: S$GLB,,, | Performed by: FAMILY MEDICINE

## 2020-07-16 PROCEDURE — 1159F PR MEDICATION LIST DOCUMENTED IN MEDICAL RECORD: ICD-10-PCS | Mod: S$GLB,,, | Performed by: FAMILY MEDICINE

## 2020-07-16 PROCEDURE — 99214 OFFICE O/P EST MOD 30 MIN: CPT | Mod: S$GLB,,, | Performed by: FAMILY MEDICINE

## 2020-07-16 PROCEDURE — 1101F PR PT FALLS ASSESS DOC 0-1 FALLS W/OUT INJ PAST YR: ICD-10-PCS | Mod: S$GLB,,, | Performed by: FAMILY MEDICINE

## 2020-07-16 PROCEDURE — 1159F MED LIST DOCD IN RCRD: CPT | Mod: S$GLB,,, | Performed by: FAMILY MEDICINE

## 2020-07-16 PROCEDURE — 3079F DIAST BP 80-89 MM HG: CPT | Mod: S$GLB,,, | Performed by: FAMILY MEDICINE

## 2020-07-16 PROCEDURE — 3075F PR MOST RECENT SYSTOLIC BLOOD PRESS GE 130-139MM HG: ICD-10-PCS | Mod: S$GLB,,, | Performed by: FAMILY MEDICINE

## 2020-07-16 PROCEDURE — 3075F SYST BP GE 130 - 139MM HG: CPT | Mod: S$GLB,,, | Performed by: FAMILY MEDICINE

## 2020-07-16 PROCEDURE — 99999 PR PBB SHADOW E&M-EST. PATIENT-LVL IV: CPT | Mod: PBBFAC,,, | Performed by: FAMILY MEDICINE

## 2020-07-16 PROCEDURE — 1126F AMNT PAIN NOTED NONE PRSNT: CPT | Mod: S$GLB,,, | Performed by: FAMILY MEDICINE

## 2020-07-16 RX ORDER — PANTOPRAZOLE SODIUM 40 MG/1
40 TABLET, DELAYED RELEASE ORAL DAILY
Qty: 90 TABLET | Refills: 1 | Status: SHIPPED | OUTPATIENT
Start: 2020-07-16 | End: 2022-05-11

## 2020-07-16 RX ORDER — GLIMEPIRIDE 2 MG/1
1 TABLET ORAL
Qty: 90 TABLET | Refills: 0
Start: 2020-07-16 | End: 2020-08-14

## 2020-07-16 NOTE — PROGRESS NOTES
"Subjective:       Patient ID: Dolores B LeJeune is a 85 y.o. female.    Chief Complaint: Hypertension    Pt is known to me.  Pt is here for followup of chronic medical issues.  The pt is doing well.  She is being very careful regarding COVID.    The pt recently Dr. Estevez (primary care in [place])--he gave her clindamycin for an infected left great toe.  Her toe is much better.  Her recent HbA1c is 6.1.    She reports that she is having "yellow urine."  She has no pain with urinating.  She takes a stool softener daily.  She complains of dependent leg swelling that is gone by the following morning.  She has no orthopnea nor PND.    Review of Systems   Constitutional: Negative for activity change, appetite change, fatigue and unexpected weight change.   Eyes: Negative for visual disturbance.   Respiratory: Negative for cough, chest tightness and shortness of breath.    Cardiovascular: Positive for leg swelling. Negative for chest pain and palpitations.   Gastrointestinal: Negative for abdominal pain, constipation, diarrhea, nausea and vomiting.   Endocrine: Negative for cold intolerance, heat intolerance and polyuria.   Genitourinary: Positive for enuresis (when she holds her urine). Negative for decreased urine volume, dysuria and urgency.   Musculoskeletal: Positive for arthralgias. Negative for back pain.   Skin: Negative for rash.   Neurological: Negative for numbness and headaches.   Psychiatric/Behavioral: Negative for confusion and sleep disturbance.       Objective:       Vitals:    07/16/20 1010   BP: 138/82   Pulse: 97   Temp: 99.2 °F (37.3 °C)   TempSrc: Temporal   SpO2: 97%   Weight: 67.4 kg (148 lb 9.4 oz)   Height: 4' 11" (1.499 m)     Physical Exam    Assessment:       1. Type 2 diabetes mellitus with hyperlipidemia    2. Hypertension associated with diabetes    3. Hypercholesterolemia    4. Renal insufficiency    5. Dark urine        Plan:       Kelly was seen today for hypertension.    Diagnoses and all " orders for this visit:    Type 2 diabetes mellitus with hyperlipidemia  -     glimepiride (AMARYL) 2 MG tablet; Take 0.5 tablets (1 mg total) by mouth daily with breakfast.    Hypertension associated with diabetes  -     glimepiride (AMARYL) 2 MG tablet; Take 0.5 tablets (1 mg total) by mouth daily with breakfast.    Hypercholesterolemia    Renal insufficiency    Dark urine  -     Urinalysis; Future    Other orders  -     pantoprazole (PROTONIX) 40 MG tablet; Take 1 tablet (40 mg total) by mouth once daily.      During this visit, I reviewed the pt's history, medications, allergies, and problem list.

## 2020-07-20 ENCOUNTER — LAB VISIT (OUTPATIENT)
Dept: LAB | Facility: HOSPITAL | Age: 85
End: 2020-07-20
Attending: INTERNAL MEDICINE
Payer: MEDICARE

## 2020-07-20 ENCOUNTER — TELEPHONE (OUTPATIENT)
Dept: CARDIOLOGY | Facility: CLINIC | Age: 85
End: 2020-07-20

## 2020-07-20 DIAGNOSIS — E78.00 HYPERCHOLESTEROLEMIA: Primary | ICD-10-CM

## 2020-07-20 DIAGNOSIS — I10 ESSENTIAL HYPERTENSION: ICD-10-CM

## 2020-07-20 DIAGNOSIS — E78.00 HYPERCHOLESTEROLEMIA: ICD-10-CM

## 2020-07-20 LAB
ALBUMIN SERPL BCP-MCNC: 3.5 G/DL (ref 3.5–5.2)
ALP SERPL-CCNC: 63 U/L (ref 55–135)
ALT SERPL W/O P-5'-P-CCNC: 13 U/L (ref 10–44)
ANION GAP SERPL CALC-SCNC: 7 MMOL/L (ref 8–16)
AST SERPL-CCNC: 21 U/L (ref 10–40)
BILIRUB SERPL-MCNC: 0.4 MG/DL (ref 0.1–1)
BUN SERPL-MCNC: 14 MG/DL (ref 8–23)
CALCIUM SERPL-MCNC: 9.2 MG/DL (ref 8.7–10.5)
CHLORIDE SERPL-SCNC: 105 MMOL/L (ref 95–110)
CHOLEST SERPL-MCNC: 126 MG/DL (ref 120–199)
CHOLEST/HDLC SERPL: 3.7 {RATIO} (ref 2–5)
CO2 SERPL-SCNC: 29 MMOL/L (ref 23–29)
CREAT SERPL-MCNC: 1.1 MG/DL (ref 0.5–1.4)
EST. GFR  (AFRICAN AMERICAN): 53.3 ML/MIN/1.73 M^2
EST. GFR  (NON AFRICAN AMERICAN): 46.2 ML/MIN/1.73 M^2
GLUCOSE SERPL-MCNC: 88 MG/DL (ref 70–110)
HDLC SERPL-MCNC: 34 MG/DL (ref 40–75)
HDLC SERPL: 27 % (ref 20–50)
LDLC SERPL CALC-MCNC: 72 MG/DL (ref 63–159)
NONHDLC SERPL-MCNC: 92 MG/DL
POTASSIUM SERPL-SCNC: 4.5 MMOL/L (ref 3.5–5.1)
PROT SERPL-MCNC: 7.2 G/DL (ref 6–8.4)
SODIUM SERPL-SCNC: 141 MMOL/L (ref 136–145)
TRIGL SERPL-MCNC: 100 MG/DL (ref 30–150)

## 2020-07-20 PROCEDURE — 80053 COMPREHEN METABOLIC PANEL: CPT

## 2020-07-20 PROCEDURE — 80061 LIPID PANEL: CPT

## 2020-07-20 PROCEDURE — 36415 COLL VENOUS BLD VENIPUNCTURE: CPT | Mod: PO

## 2020-07-20 NOTE — TELEPHONE ENCOUNTER
----- Message from Zully Winter sent at 7/20/2020  8:56 AM CDT -----  Type: Needs Medical Advice  Who Called:  Patient  Best Call Back Number:   Additional Information: Patient says she was told orders for labs would be put in for her but they were not. She went to the lab today to have them done and was turned away.

## 2020-07-20 NOTE — TELEPHONE ENCOUNTER
----- Message from Princess CHARLI Bursn sent at 7/20/2020  9:10 AM CDT -----  Contact: pt  Type: Needs Medical Advice  Who Called:  pt   Best Call Back Number:     Additional Information: Calling to advise patient is waiting for her labs to be put in by the office. Please advise

## 2020-07-22 ENCOUNTER — PATIENT OUTREACH (OUTPATIENT)
Dept: ADMINISTRATIVE | Facility: OTHER | Age: 85
End: 2020-07-22

## 2020-07-23 ENCOUNTER — OFFICE VISIT (OUTPATIENT)
Dept: CARDIOLOGY | Facility: CLINIC | Age: 85
End: 2020-07-23
Payer: MEDICARE

## 2020-07-23 VITALS
BODY MASS INDEX: 30 KG/M2 | HEART RATE: 86 BPM | SYSTOLIC BLOOD PRESSURE: 129 MMHG | HEIGHT: 59 IN | DIASTOLIC BLOOD PRESSURE: 77 MMHG | WEIGHT: 148.81 LBS

## 2020-07-23 DIAGNOSIS — E78.00 HYPERCHOLESTEROLEMIA: ICD-10-CM

## 2020-07-23 DIAGNOSIS — E66.9 OBESITY, CLASS I, BMI 30-34.9: ICD-10-CM

## 2020-07-23 DIAGNOSIS — Z79.02 LONG TERM (CURRENT) USE OF ANTITHROMBOTICS/ANTIPLATELETS: ICD-10-CM

## 2020-07-23 DIAGNOSIS — I25.5 ISCHEMIC CARDIOMYOPATHY: Primary | ICD-10-CM

## 2020-07-23 DIAGNOSIS — E11.59 HYPERTENSION ASSOCIATED WITH DIABETES: ICD-10-CM

## 2020-07-23 DIAGNOSIS — I15.2 HYPERTENSION ASSOCIATED WITH DIABETES: ICD-10-CM

## 2020-07-23 PROCEDURE — 99999 PR PBB SHADOW E&M-EST. PATIENT-LVL IV: CPT | Mod: PBBFAC,,, | Performed by: INTERNAL MEDICINE

## 2020-07-23 PROCEDURE — 3074F SYST BP LT 130 MM HG: CPT | Mod: S$GLB,,, | Performed by: INTERNAL MEDICINE

## 2020-07-23 PROCEDURE — 1101F PT FALLS ASSESS-DOCD LE1/YR: CPT | Mod: S$GLB,,, | Performed by: INTERNAL MEDICINE

## 2020-07-23 PROCEDURE — 99999 PR PBB SHADOW E&M-EST. PATIENT-LVL IV: ICD-10-PCS | Mod: PBBFAC,,, | Performed by: INTERNAL MEDICINE

## 2020-07-23 PROCEDURE — 3078F PR MOST RECENT DIASTOLIC BLOOD PRESSURE < 80 MM HG: ICD-10-PCS | Mod: S$GLB,,, | Performed by: INTERNAL MEDICINE

## 2020-07-23 PROCEDURE — 1126F PR PAIN SEVERITY QUANTIFIED, NO PAIN PRESENT: ICD-10-PCS | Mod: S$GLB,,, | Performed by: INTERNAL MEDICINE

## 2020-07-23 PROCEDURE — 1101F PR PT FALLS ASSESS DOC 0-1 FALLS W/OUT INJ PAST YR: ICD-10-PCS | Mod: S$GLB,,, | Performed by: INTERNAL MEDICINE

## 2020-07-23 PROCEDURE — 1126F AMNT PAIN NOTED NONE PRSNT: CPT | Mod: S$GLB,,, | Performed by: INTERNAL MEDICINE

## 2020-07-23 PROCEDURE — 99214 PR OFFICE/OUTPT VISIT, EST, LEVL IV, 30-39 MIN: ICD-10-PCS | Mod: S$GLB,,, | Performed by: INTERNAL MEDICINE

## 2020-07-23 PROCEDURE — 1159F MED LIST DOCD IN RCRD: CPT | Mod: S$GLB,,, | Performed by: INTERNAL MEDICINE

## 2020-07-23 PROCEDURE — 99214 OFFICE O/P EST MOD 30 MIN: CPT | Mod: S$GLB,,, | Performed by: INTERNAL MEDICINE

## 2020-07-23 PROCEDURE — 1159F PR MEDICATION LIST DOCUMENTED IN MEDICAL RECORD: ICD-10-PCS | Mod: S$GLB,,, | Performed by: INTERNAL MEDICINE

## 2020-07-23 PROCEDURE — 3074F PR MOST RECENT SYSTOLIC BLOOD PRESSURE < 130 MM HG: ICD-10-PCS | Mod: S$GLB,,, | Performed by: INTERNAL MEDICINE

## 2020-07-23 PROCEDURE — 3078F DIAST BP <80 MM HG: CPT | Mod: S$GLB,,, | Performed by: INTERNAL MEDICINE

## 2020-07-23 RX ORDER — METOPROLOL SUCCINATE 25 MG/1
12.5 TABLET, EXTENDED RELEASE ORAL DAILY
Qty: 45 TABLET | Refills: 1 | Status: SHIPPED | OUTPATIENT
Start: 2020-07-23 | End: 2021-02-01 | Stop reason: SDUPTHER

## 2020-07-23 NOTE — PROGRESS NOTES
Subjective:    Patient ID:  Dolores B LeJeune is a 85 y.o. female who presents for Cardiomyopathy (labs), Hypertension, and Hyperlipidemia        HPI   DISCUSSED LABS AND GOALS CMP OK, LDL 72, DOING WELL, NO CHEST PAIN NO SIGNIFICANT SHORTNESS OF BREATH NO TIA TYPE SYMPTOMS NO NEAR-SYNCOPE, SEE ROS    Past Medical History:   Diagnosis Date    Acute MI     Anticoagulant long-term use     Carpal tunnel syndrome     Cataracts, bilateral     Colon polyps     Coronary artery disease     DM type 2 (diabetes mellitus, type 2)     HTN (hypertension)     Hyperlipidemia     Osteoarthritis     Peripheral neuropathy      Past Surgical History:   Procedure Laterality Date    CAROTID STENT      1    COLONOSCOPY  2008  [facility]    A 3 mm by 6 mm polyp in the cecum.  FRAGMENTS OF TUBULAR ADENOMA.    A 1 mm polyp in the recto-sigmoid colon.  HYPERPLASTIC POLYP.    Extremely redundant colon.      CORONARY ARTERY BYPASS GRAFT  2018    [facility], Dr. Echols. LIMA to LAD, SVG to OM1, SVG to OM2, SVG to PDA    EYE SURGERY      eye surgery    HYSTERECTOMY      ovaries remain    ptyregium      TONSILLECTOMY       Family History   Problem Relation Age of Onset    Tremor Mother     Hypertension Mother     Heart disease Mother     Stroke Father      Social History     Socioeconomic History    Marital status:      Spouse name: Not on file    Number of children: 4    Years of education: Not on file    Highest education level: Not on file   Occupational History    Not on file   Social Needs    Financial resource strain: Not on file    Food insecurity     Worry: Not on file     Inability: Not on file    Transportation needs     Medical: Not on file     Non-medical: Not on file   Tobacco Use    Smoking status: Former Smoker     Quit date: 3/15/1972     Years since quittin.4    Smokeless tobacco: Never Used   Substance and Sexual Activity    Alcohol use: No    Drug use: No    Sexual activity: Not on  file   Lifestyle    Physical activity     Days per week: Not on file     Minutes per session: Not on file    Stress: Not on file   Relationships    Social connections     Talks on phone: Not on file     Gets together: Not on file     Attends Yazidi service: Not on file     Active member of club or organization: Not on file     Attends meetings of clubs or organizations: Not on file     Relationship status: Not on file   Other Topics Concern    Not on file   Social History Narrative    Not on file       Review of patient's allergies indicates:   Allergen Reactions    Albuterol Other (See Comments)     Palpitations/tremor      Sulfa (sulfonamide antibiotics) Rash       Current Outpatient Medications:     aspirin (ECOTRIN) 81 MG EC tablet, Take 81 mg by mouth once daily., Disp: , Rfl:     atorvastatin (LIPITOR) 40 MG tablet, TAKE ONE TABLET BY MOUTH DAILY, Disp: 90 tablet, Rfl: 0    biotin 5,000 mcg TbDL, Take by mouth., Disp: , Rfl:     CALCIUM CARBONATE/VITAMIN D3 (CALCIUM 500 WITH D ORAL), Take by mouth.  , Disp: , Rfl:     clopidogrel (PLAVIX) 75 mg tablet, Take 1 tablet (75 mg total) by mouth once daily., Disp: 90 tablet, Rfl: 1    DEXTRAN 70/HYPROMELLOSE (ARTIFICIAL TEARS OPHT), Apply to eye., Disp: , Rfl:     gabapentin (NEURONTIN) 300 MG capsule, TAKE ONE CAPSULE BY MOUTH DAILY, Disp: 90 capsule, Rfl: 0    glimepiride (AMARYL) 2 MG tablet, Take 0.5 tablets (1 mg total) by mouth daily with breakfast., Disp: 90 tablet, Rfl: 0    ipratropium (ATROVENT) 42 mcg (0.06 %) nasal spray, INSTILL 2 SPRAYS IN EACH NOSTRIL THREE TIMES DAILY, Disp: 15 mL, Rfl: 3    losartan (COZAAR) 25 MG tablet, TAKE ONE TABLET BY MOUTH DAILY , Disp: 90 tablet, Rfl: 0    meclizine (ANTIVERT) 25 mg tablet, Take 1 tablet by mouth Three times daily as needed., Disp: , Rfl:     metFORMIN (GLUCOPHAGE) 500 MG tablet, TAKE 1/2 TABLET BY MOUTH TWICE DAILY WITH FOOD, Disp: 90 tablet, Rfl: 0    mupirocin (BACTROBAN) 2 %  ointment, Apply topically 3 (three) times daily., Disp: 22 g, Rfl: 0    nitroGLYCERIN (NITROSTAT) 0.4 MG SL tablet, DISSOLVE ONE TABLET UNDER TONGUE EVERY 3 TO 5 MINUTES FOR CHEST PAIN. IF NO RELIEF AFTER 3 DOSES SEEK EMERGENCY COURTNEY, Disp: 25 tablet, Rfl: 0    ONETOUCH DELICA LANCETS 33 gauge Misc, , Disp: , Rfl:     ONETOUCH VERIO Strp, 1 each by NOT APPLICABLE route 2 (two) times daily., Disp: 100 each, Rfl: 11    oxybutynin (DITROPAN-XL) 10 MG 24 hr tablet, TAKE  ONE TABLET BY MOUTH DAILY, Disp: 90 tablet, Rfl: 0    pantoprazole (PROTONIX) 40 MG tablet, Take 1 tablet (40 mg total) by mouth once daily., Disp: 90 tablet, Rfl: 1    RUTIN/HESP/BIOFLAV/C/[id number] (BIOFLEX ORAL), Take 1 tablet by mouth once daily at 6am., Disp: , Rfl:     ACCU-CHEK SOFTCLIX LANCETS Misc, USE ONE AS DIRECTED TWICE DAILY, Disp: 200 each, Rfl: 10    metoprolol succinate (TOPROL-XL) 25 MG 24 hr tablet, Take 0.5 tablets (12.5 mg total) by mouth once daily., Disp: 45 tablet, Rfl: 1    Review of Systems   Constitution: Negative for chills, diaphoresis, fever, malaise/fatigue, night sweats and weight gain (SOME).   HENT: Negative for congestion, hearing loss and nosebleeds.    Eyes: Negative for blurred vision and visual disturbance.   Cardiovascular: Negative for chest pain, claudication, cyanosis, dyspnea on exertion (MINIMAL), irregular heartbeat, leg swelling (LLE, MILD), near-syncope, orthopnea, palpitations, paroxysmal nocturnal dyspnea and syncope.   Respiratory: Negative for cough, hemoptysis, shortness of breath and wheezing.    Endocrine: Negative for cold intolerance, heat intolerance and polyuria.   Hematologic/Lymphatic: Negative for adenopathy. Does not bruise/bleed easily.   Skin: Negative for itching and rash.   Musculoskeletal: Negative for back pain, falls and joint pain.   Gastrointestinal: Negative for abdominal pain, change in bowel habit, heartburn, hematemesis, jaundice and melena.   Genitourinary: Negative for  "dysuria, flank pain and frequency.   Neurological: Negative for brief paralysis, focal weakness, light-headedness, loss of balance, numbness, sensory change, tremors and weakness.   Psychiatric/Behavioral: Negative for altered mental status, depression and memory loss.   Allergic/Immunologic: Negative for hives and persistent infections.        Objective:      Vitals:    07/23/20 1544   BP: 129/77   Pulse: 86   Weight: 67.5 kg (148 lb 13 oz)   Height: 4' 11" (1.499 m)   PainSc: 0-No pain     Body mass index is 30.06 kg/m².    Physical Exam   Constitutional: She is oriented to person, place, and time. She appears well-developed and well-nourished. She is active.   OVERWEIGHT   HENT:   Head: Normocephalic and atraumatic.   Mouth/Throat: Oropharynx is clear and moist and mucous membranes are normal.   Eyes: Pupils are equal, round, and reactive to light. Conjunctivae and EOM are normal.   Neck: Trachea normal and normal range of motion. Neck supple. Normal carotid pulses, no hepatojugular reflux and no JVD present. Carotid bruit is not present. No edema and no erythema present. No thyromegaly present.   Cardiovascular: Normal rate, regular rhythm and normal heart sounds.  No extrasystoles are present. PMI is not displaced. Exam reveals no gallop and no friction rub.   No murmur heard.  Pulses:       Carotid pulses are 2+ on the right side and 2+ on the left side.       Radial pulses are 2+ on the right side and 2+ on the left side.        Femoral pulses are 2+ on the right side and 2+ on the left side.       Popliteal pulses are 2+ on the right side and 2+ on the left side.        Dorsalis pedis pulses are 2+ on the right side and 2+ on the left side.        Posterior tibial pulses are 2+ on the right side and 2+ on the left side.   Pulmonary/Chest: Effort normal and breath sounds normal. No tachypnea and no bradypnea. She has no wheezes. She has no rales. She exhibits no tenderness.   Abdominal: Soft. Bowel sounds are " normal. She exhibits no distension and no mass. There is no hepatosplenomegaly. There is no CVA tenderness.   Musculoskeletal: Normal range of motion.         General: No tenderness or edema.      Comments: TRACE TO MILD EDEMA LLE, VARICOSE VEINS   Lymphadenopathy:     She has no cervical adenopathy.   Neurological: She is alert and oriented to person, place, and time. She has normal strength. She displays no tremor. No cranial nerve deficit ([place]).   Skin: Skin is warm and dry. No cyanosis or erythema. No pallor.   Psychiatric: She has a normal mood and affect. Her speech is normal and behavior is normal.               ..    Chemistry        Component Value Date/Time     07/20/2020 1030    K 4.5 07/20/2020 1030     07/20/2020 1030    CO2 29 07/20/2020 1030    BUN 14 07/20/2020 1030    CREATININE 1.1 07/20/2020 1030    GLU 88 07/20/2020 1030        Component Value Date/Time    CALCIUM 9.2 07/20/2020 1030    ALKPHOS 63 07/20/2020 1030    AST 21 07/20/2020 1030    ALT 13 07/20/2020 1030    BILITOT 0.4 07/20/2020 1030    ESTGFRAFRICA 53.3 (A) 07/20/2020 1030    EGFRNONAA 46.2 (A) 07/20/2020 1030            ..  Lab Results   Component Value Date    CHOL 126 07/20/2020    CHOL 123 11/25/2019    CHOL 147 05/15/2019     Lab Results   Component Value Date    HDL 34 (L) 07/20/2020    HDL 35 (L) 11/25/2019    HDL 39 (L) 05/15/2019     Lab Results   Component Value Date    LDLCALC 72.0 07/20/2020    LDLCALC 52.2 (L) 11/25/2019    LDLCALC 87.6 05/15/2019     Lab Results   Component Value Date    TRIG 100 07/20/2020    TRIG 179 (H) 11/25/2019    TRIG 102 05/15/2019     Lab Results   Component Value Date    CHOLHDL 27.0 07/20/2020    CHOLHDL 28.5 11/25/2019    CHOLHDL 26.5 05/15/2019     ..  Lab Results   Component Value Date    WBC 11.93 02/28/2018    HGB 11.6 (L) 05/15/2019    HCT 31.8 (L) 02/28/2018    MCV 91 02/28/2018     02/28/2018       Test(s) Reviewed  I have reviewed the following in detail:  []  Stress test   [] Angiography   [] Echocardiogram   [x] Labs   [] Other:       Assessment:         ICD-10-CM ICD-9-CM   1. Ischemic cardiomyopathy  I25.5 414.8   2. Hypertension associated with diabetes  E11.59 250.80    I10 401.9   3. Long term (current) use of antithrombotics/antiplatelets  Z79.02 V58.63   4. Obesity, Class I, BMI 30-34.9  E66.9 278.00   5. Hypercholesterolemia  E78.00 272.0     Problem List Items Addressed This Visit        Cardiac/Vascular    Hypercholesterolemia    Relevant Orders    Comprehensive metabolic panel    Hypertension associated with diabetes    Relevant Orders    Comprehensive metabolic panel    Ischemic cardiomyopathy - Primary    Relevant Orders    Comprehensive metabolic panel       Hematology    Long term (current) use of antithrombotics/antiplatelets    Relevant Orders    Comprehensive metabolic panel       Endocrine    Obesity, Class I, BMI 30-34.9    Relevant Orders    Comprehensive metabolic panel           Plan:         ADD TOPROL TO MEDICAL REGIMEN DUE FOR CARDIOMYOPATHY AND SLIGHTLY INCREASED HEART RATE,ALL CV CLINICALLY STABLE, NO ANGINA, NO HF, NO TIA, NO CLINICAL ARRHYTHMIA,CONTINUE CURRENT MEDS, EDUCATION, DIET, EXERCISE, WEIGHT LOSS, RETURN TO CLINIC IN, 6 MO WITH LABS  Ischemic cardiomyopathy  -     Comprehensive metabolic panel; Future; Expected date: 01/23/2021    Hypertension associated with diabetes  -     Comprehensive metabolic panel; Future; Expected date: 01/23/2021    Long term (current) use of antithrombotics/antiplatelets  -     Comprehensive metabolic panel; Future; Expected date: 01/23/2021    Obesity, Class I, BMI 30-34.9  -     Comprehensive metabolic panel; Future; Expected date: 01/23/2021    Hypercholesterolemia  -     Comprehensive metabolic panel; Future; Expected date: 01/23/2021    Other orders  -     metoprolol succinate (TOPROL-XL) 25 MG 24 hr tablet; Take 0.5 tablets (12.5 mg total) by mouth once daily.  Dispense: 45 tablet; Refill: 1    RTC  Low level/low impact aerobic exercise 5x's/wk. Heart healthy diet and risk factor modification.    See labs and med orders.    Aerobic exercise 5x's/wk. Heart healthy diet and risk factor modification.    See labs and med orders.

## 2020-07-27 RX ORDER — GABAPENTIN 300 MG/1
CAPSULE ORAL
Qty: 90 CAPSULE | Refills: 0 | Status: SHIPPED | OUTPATIENT
Start: 2020-07-27 | End: 2020-11-02 | Stop reason: SDUPTHER

## 2020-07-27 NOTE — PROGRESS NOTES
Refill Routing Note   Medication(s) are not appropriate for processing by [facility]:    - Outside of Protocol       Automatic Epic Protocol Generated Data:    Requested Prescriptions   Pending Prescriptions Disp Refills    gabapentin (NEURONTIN) 300 MG capsule [Pharmacy Med Name: GABAPENTIN 300 MG        CAP] 90 capsule 0     Sig: TAKE ONE CAPSULE BY MOUTH DAILY       Anticonvulsants Protocol Passed - 7/27/2020 10:28 AM        Passed - Visit with Authorizing provider in past 9 months or upcoming 90 days        Passed - No active pregnancy on record              Appointments [id number] or future 3m with PCP    Date Provider   Last Visit   7/16/2020 BEBO Soto MD   Next Visit   1/19/2021 BEBO Soto MD   ED visits in past 90 days: 0     Note composed:11:24 AM 07/27/2020

## 2020-08-13 DIAGNOSIS — I15.2 HYPERTENSION ASSOCIATED WITH DIABETES: ICD-10-CM

## 2020-08-13 DIAGNOSIS — E78.5 TYPE 2 DIABETES MELLITUS WITH HYPERLIPIDEMIA: ICD-10-CM

## 2020-08-13 DIAGNOSIS — E11.69 TYPE 2 DIABETES MELLITUS WITH HYPERLIPIDEMIA: ICD-10-CM

## 2020-08-13 DIAGNOSIS — E11.59 HYPERTENSION ASSOCIATED WITH DIABETES: ICD-10-CM

## 2020-08-14 RX ORDER — METFORMIN HYDROCHLORIDE 500 MG/1
TABLET ORAL
Qty: 90 TABLET | Refills: 1 | Status: SHIPPED | OUTPATIENT
Start: 2020-08-14 | End: 2021-02-22

## 2020-08-14 RX ORDER — GLIMEPIRIDE 2 MG/1
TABLET ORAL
Qty: 90 TABLET | Refills: 0 | Status: SHIPPED | OUTPATIENT
Start: 2020-08-14 | End: 2021-01-19 | Stop reason: ALTCHOICE

## 2020-08-14 NOTE — PROGRESS NOTES
Refill Authorization Note    Mrs.LeJeune is requesting a refill authorization.    Brief assessment and rationale for refill: DEFER: glimepiride -recent dose adjustment // APPROVE: metformin -prr     Medication-related problems identified: Dose adjustment    Medication Therapy Plan: CDMR. Glimiperide recently decreased from 2mg daily to 1mg daily(7/16/20); Undergoing a recent dose adjustment; Defer glimepiride; Approve metformin    Medication reconciliation completed: No                         Comments:   Automatic Epic Protocol Generated Data:    Requested Prescriptions   Pending Prescriptions Disp Refills    glimepiride (AMARYL) 2 MG tablet [Pharmacy Med Name: GLIMEPIRIDE 2 MG         TAB] 90 tablet 0     Sig: TAKE ONE TABLET BY MOUTH DAILY WITH BREAKFAST       Endocrinology:  Diabetes - Sulfonylureas Passed - 8/13/2020 11:07 PM        Passed - Patient is at least 18 years old        Passed - Office visit in past 12 months or future 90 days.     Recent Outpatient Visits            3 weeks ago Ischemic cardiomyopathy    [place] [facility] Shayla Galvez MD    4 weeks ago Type 2 diabetes mellitus with hyperlipidemia    [facility] Medicine BEBO Soto MD    8 months ago Ischemic cardiomyopathy    [place] [facility] Shayla Galvez MD    9 months ago [facility]ension [facility] with diabetes    [facility]    1 year ago Hypertension associated with diabetes    [facility] BEBO Soto MD          Future Appointments              In 5 days [facility]    In 5 months BE[facility] Soto MD [facility]    In 5 months [facility]ATORY, [place] [facility]    In 5 months [facility]                Passed - HBA1C is 7.9 or below and within 180 days     Hemoglobin A1C   Date Value Ref Range Status   07/14/2020 6.1 (H) 4.0 - 5.6 %  Final     Comment:     ADA Screening Guidelines:  5.7-6.4%  Consistent with prediabetes  >or=6.5%  Consistent with diabetes  High levels of fetal hemoglobin interfere with the HbA1C  assay. Heterozygous hemoglobin variants (HbS, HgC, etc)do  not significantly interfere with this assay.   However, presence of multiple variants may affect accuracy.     11/12/2019 6.3 (H) 4.0 - 5.6 % Final     Comment:     ADA Screening Guidelines:  5.7-6.4%  Consistent with prediabetes  >or=6.5%  Consistent with diabetes  High levels of fetal hemoglobin interfere with the HbA1C  assay. Heterozygous hemoglobin variants (HbS, HgC, etc)do  not significantly interfere with this assay.   However, presence of multiple variants may affect accuracy.     05/28/2019 6.5 (H) 4.0 - 5.6 % Final     Comment:     ADA Screening Guidelines:  5.7-6.4%  Consistent with prediabetes  >or=6.5%  Consistent with diabetes  High levels of fetal hemoglobin interfere with the HbA1C  assay. Heterozygous hemoglobin variants (HbS, HgC, etc)do  not significantly interfere with this assay.   However, presence of multiple variants may affect accuracy.                Passed - Cr is 1.3 or below and within 360 days     Creatinine   Date Value Ref Range Status   07/20/2020 1.1 0.5 - 1.4 mg/dL Final   11/25/2019 1.3 0.5 - 1.4 mg/dL Final   05/15/2019 1.3 0.5 - 1.4 mg/dL Final              Passed - eGFR is 30 or above and within 360 days     eGFR if non    Date Value Ref Range Status   07/20/2020 46.2 (A) >60 mL/min/1.73 m^2 Final     Comment:     Calculation used to obtain the estimated glomerular filtration  rate (eGFR) is the CKD-EPI equation.      11/25/2019 38.0 (A) >60 mL/min/1.73 m^2 Final     Comment:     Calculation used to obtain the estimated glomerular filtration  rate (eGFR) is the CKD-EPI equation.      05/15/2019 38.3 (A) >60 mL/min/1.73 m^2 Final     Comment:     Calculation used to obtain the estimated glomerular filtration  rate (eGFR) is  the CKD-EPI equation.        eGFR if    Date Value Ref Range Status   07/20/2020 53.3 (A) >60 mL/min/1.73 m^2 Final   11/25/2019 43.8 (A) >60 mL/min/1.73 m^2 Final   05/15/2019 44.1 (A) >60 mL/min/1.73 m^2 Final               Signed Prescriptions Disp Refills    metFORMIN (GLUCOPHAGE) 500 MG tablet 90 tablet 1     Sig: TAKE 1/2 TABLET BY MOUTH TWICE DAILY WITH FOOD       Endocrinology:  Diabetes - Biguanides Passed - 8/13/2020 11:07 PM        Passed - Patient is at least 18 years old        Passed - Office visit in past 12 months or future 90 days.     Recent Outpatient Visits            3 weeks ago Ischemic cardiomyopathy    [facility] Card[facility] Shayla Galvez MD    4 weeks ago Type 2 diabetes mellitus with hyperlipidemia    [facility] BEBO Soto MD    8 months ago Ischemic cardiomyopathy    [facility] Shayla Galvez MD    9 months ago Hypertension associated with diabetes    [facility] Medicine BEBO Soot MD    1 year ago Hypertension associated with diabetes    [facility] BEBO Soto MD          Future Appointments              In 5 days [facility]    In 5 months BEBO Soto MD [facility]    In 5 months [facility]ATORY, [place] [facility]    In 5 months Shayla Galvez MD [facility]                Passed - Cr is 1.3 or below and within 360 days     Creatinine   Date Value Ref Range Status   07/20/2020 1.1 0.5 - 1.4 mg/dL Final   11/25/2019 1.3 0.5 - 1.4 mg/dL Final   05/15/2019 1.3 0.5 - 1.4 mg/dL Final              Passed - HBA1C is 7.9 or below and within 180 days     Hemoglobin A1C   Date Value Ref Range Status   07/14/2020 6.1 (H) 4.0 - 5.6 % Final     Comment:     ADA Screening Guidelines:  5.7-6.4%  Consistent with prediabetes  >or=6.5%  Consistent with diabetes  High levels of fetal hemoglobin  interfere with the HbA1C  assay. Heterozygous hemoglobin variants (HbS, HgC, etc)do  not significantly interfere with this assay.   However, presence of multiple variants may affect accuracy.     11/12/2019 6.3 (H) 4.0 - 5.6 % Final     Comment:     ADA Screening Guidelines:  5.7-6.4%  Consistent with prediabetes  >or=6.5%  Consistent with diabetes  High levels of fetal hemoglobin interfere with the HbA1C  assay. Heterozygous hemoglobin variants (HbS, HgC, etc)do  not significantly interfere with this assay.   However, presence of multiple variants may affect accuracy.     05/28/2019 6.5 (H) 4.0 - 5.6 % Final     Comment:     ADA Screening Guidelines:  5.7-6.4%  Consistent with prediabetes  >or=6.5%  Consistent with diabetes  High levels of fetal hemoglobin interfere with the HbA1C  assay. Heterozygous hemoglobin variants (HbS, HgC, etc)do  not significantly interfere with this assay.   However, presence of multiple variants may affect accuracy.                Passed - eGFR is 30 or above and within 360 days     eGFR if non    Date Value Ref Range Status   07/20/2020 46.2 (A) >60 mL/min/1.73 m^2 Final     Comment:     Calculation used to obtain the estimated glomerular filtration  rate (eGFR) is the CKD-EPI equation.      11/25/2019 38.0 (A) >60 mL/min/1.73 m^2 Final     Comment:     Calculation used to obtain the estimated glomerular filtration  rate (eGFR) is the CKD-EPI equation.      05/15/2019 38.3 (A) >60 mL/min/1.73 m^2 Final     Comment:     Calculation used to obtain the estimated glomerular filtration  rate (eGFR) is the CKD-EPI equation.        eGFR if    Date Value Ref Range Status   07/20/2020 53.3 (A) >60 mL/min/1.73 m^2 Final   11/25/2019 43.8 (A) >60 mL/min/1.73 m^2 Final   05/15/2019 44.1 (A) >60 mL/min/1.73 m^2 Final                    Appointments  past 12m or future 3m with PCP    Date Provider   Last Visit   7/16/2020 BEBO Soto MD   Next Visit   1/19/2021 BEBO  Silvia Soto MD   ED visits in past 90 days: 0     Note composed:11:32 AM 08/14/2020

## 2020-08-14 NOTE — TELEPHONE ENCOUNTER
No new care gaps identified.  Powered by [employer] Reference number: [id number]. 8/13/2020 11:08:20 PM   CDT

## 2020-08-19 ENCOUNTER — HOSPITAL ENCOUNTER (OUTPATIENT)
Dept: RADIOLOGY | Facility: HOSPITAL | Age: 85
Discharge: HOME | End: 2020-08-19
Attending: FAMILY MEDICINE
Payer: MEDICARE

## 2020-08-19 DIAGNOSIS — Z12.31 ENCOUNTER FOR SCREENING MAMMOGRAM FOR BREAST CANCER: ICD-10-CM

## 2020-08-19 PROCEDURE — 77067 MAMMO DIGITAL SCREENING BILAT WITH TOMOSYNTHESIS_CAD: ICD-10-PCS | Mod: 26,,, | Performed by: RADIOLOGY

## 2020-08-19 PROCEDURE — 77063 MAMMO DIGITAL SCREENING BILAT WITH TOMOSYNTHESIS_CAD: ICD-10-PCS | Mod: 26,,, | Performed by: RADIOLOGY

## 2020-08-19 PROCEDURE — 77063 BREAST TOMOSYNTHESIS BI: CPT | Mod: 26,,, | Performed by: RADIOLOGY

## 2020-08-19 PROCEDURE — 77067 SCR MAMMO BI INCL CAD: CPT | Mod: TC,PO

## 2020-08-19 PROCEDURE — 77067 SCR MAMMO BI INCL CAD: CPT | Mod: 26,,, | Performed by: RADIOLOGY

## 2020-08-22 DIAGNOSIS — E11.8 TYPE 2 DIABETES MELLITUS WITH COMPLICATION, WITHOUT LONG-TERM CURRENT USE OF INSULIN: Primary | ICD-10-CM

## 2020-08-22 NOTE — TELEPHONE ENCOUNTER
No new care gaps identified.  Powered by [employer]. Reference number: [id number]. 8/22/2020 8:49:41 AM IVÁNT

## 2020-08-23 RX ORDER — BLOOD-GLUCOSE METER
EACH MISCELLANEOUS
Qty: 200 EACH | Refills: 3 | Status: SHIPPED | OUTPATIENT
Start: 2020-08-23 | End: 2022-03-01

## 2020-08-24 NOTE — PROGRESS NOTES
Refill Authorization Note   Mrs.LeJeune is requesting a refill authorization.    Brief assessment and rationale for refill: APPROVE: prr  Name and strength of medication: test strip             Medication reconciliation completed: No                       Comments:      Orders Placed This Encounter    ONETOUCH VERIO TEST STRIPS Strp      Requested Prescriptions   Signed Prescriptions Disp Refills    ONETOUCH VERIO TEST STRIPS Strp 200 each 3     Sig: USE ONE STRIP FOR TESTING TWICE DAILY       There is no refill protocol information for this order         Diabetic Supplies Testing Frequency   BID    Last A1c:    Lab Results   Component Value Date    HGBA1C 6.1 (H) 07/14/2020    HGBA1C 6.3 (H) 11/12/2019    HGBA1C 6.5 (H) 05/28/2019    No results found for: LABA1C     Digital Medicine Diabetes Data: values will display if enrolled   Last 6 Patient Entered Readings                                          Most Recent A1c:      There is no flowsheet data to display.        - History of CABG  - History of MI               Appointments     Date Provider   Last Visit   7/16/2020 BEBO Soto MD   Next Visit   1/19/2021 BEBO Soto MD     ED Visits in past 90 days:    0     Note composed:9:44 PM 08/23/2020        Appointments  past 12m or future 3m with PCP    Date Provider   Last Visit   7/16/2020 BEBO Soto MD   Next Visit   1/19/2021 BEBO Soto MD   ED visits in past 90 days: 0     Note composed:9:44 PM 08/23/2020

## 2020-09-16 DIAGNOSIS — N39.3 SUI (STRESS URINARY INCONTINENCE, FEMALE): ICD-10-CM

## 2020-09-16 RX ORDER — OXYBUTYNIN CHLORIDE 10 MG/1
TABLET, EXTENDED RELEASE ORAL
Qty: 90 TABLET | Refills: 1 | Status: SHIPPED | OUTPATIENT
Start: 2020-09-16 | End: 2021-03-17 | Stop reason: SDUPTHER

## 2020-09-16 NOTE — PROGRESS NOTES
Refill Routing Note   Medication(s) are not appropriate for processing by [facility] for the following reason(s)   - Outside of Protocol    Medication reconciliation completed: No   Automatic Epic Protocol Generated Data:    Requested Prescriptions   Pending Prescriptions Disp Refills    oxybutynin (DITROPAN-XL) 10 MG 24 hr tablet [Pharmacy Med Name: OXYBUTYNIN ER 10 MG TAB AJAN] 90 tablet 0     Sig: TAKE ONE TABLET BY MOUTH DAILY       Off-Protocol Failed - 9/16/2020 10:46 AM        Failed - Medication not assigned to a protocol, review manually.        Passed - Office visit in past 12 months or future 90 days.     Recent Outpatient Visits            1 month ago Ischemic cardiomyopathy    [facility] Shayla Galvez MD    2 months ago Type 2 diabetes mellitus with hyperlipidemia    [facility]    9 months ago Ischemic cardiomyopathy    [facility] Shayla Galvez MD    10 months ago Hypertension associated with diabetes    [facility] Rashawn Soto MD    1 year ago Hypertension associated with diabetes    [facility] BEBO Soto MD          Future Appointments              In 4 months BEBO Soto MD [facility]    In 4 months LABORATORY, [place] [facility]    In 4 months [facility]                      Appointments     Date Provider   Last Visit   7/16/2020 BEBO Soto MD   Next Visit   1/19/2021 BEBO Soto MD   ED visits in past 90 days: 0    Note composed:2:06 PM 09/16/2020

## 2020-10-30 RX ORDER — GABAPENTIN 300 MG/1
300 CAPSULE ORAL DAILY
Qty: 90 CAPSULE | Refills: 0 | Status: CANCELLED | OUTPATIENT
Start: 2020-10-30

## 2020-10-30 NOTE — PROGRESS NOTES
Refill Routing Note   Medication(s) are not appropriate for processing by [facility] for the following reason(s):     - Outside of protocol    [facility] actions taken in this encounter: Route          Medication reconciliation completed: No   Automatic Epic Generated Protocol Data:        Requested Prescriptions   Pending Prescriptions Disp Refills    gabapentin (NEURONTIN) 300 MG capsule [Pharmacy Med Name: GABAPENTIN 300 MG        CAP] 90 capsule 0     Sig: TAKE ONE CAPSULE BY MOUTH DAILY       Anticonvulsants Protocol Passed - 10/30/2020  8:22 AM        Passed - Visit with Authorizing provider in past 9 months or upcoming 90 days        Passed - No active pregnancy on record              Appointments  past 12m or future 3m with PCP    Date Provider   Last Visit   7/16/2020 BEBO Soto MD   Next Visit   1/19/2021 BEBO Soto MD   ED visits in past 90 days: 0        Note composed:8:32 AM 10/30/2020

## 2020-11-02 RX ORDER — GABAPENTIN 300 MG/1
CAPSULE ORAL
Qty: 90 CAPSULE | Refills: 0 | Status: SHIPPED | OUTPATIENT
Start: 2020-11-02 | End: 2021-01-19 | Stop reason: SDUPTHER

## 2020-12-04 DIAGNOSIS — J30.1 SEASONAL ALLERGIC RHINITIS DUE TO POLLEN: ICD-10-CM

## 2020-12-04 RX ORDER — IPRATROPIUM BROMIDE 42 UG/1
SPRAY, METERED NASAL
Qty: 45 ML | Refills: 2 | Status: SHIPPED | OUTPATIENT
Start: 2020-12-04 | End: 2022-03-03

## 2020-12-04 NOTE — PROGRESS NOTES
Refill Authorization Note   Dolores LeJeune  is requesting a refill authorization.  Brief Assessment and Rationale for Refill:  Approve     Medication Therapy Plan:  [facility]    Medication Reconciliation Completed: No   Comments:   Orders Placed This Encounter    ipratropium (ATROVENT) 42 mcg (0.06 %) nasal spray      Requested Prescriptions   Signed Prescriptions Disp Refills    ipratropium (ATROVENT) 42 mcg (0.06 %) nasal spray 45 mL 2     Sig: INSTILL 2 SPRAYS IN EACH NOSTRIL THREE TIMES DAILY       There is no refill protocol information for this order         Appointments  past 12m or future 3m with PCP    Date Provider   Last Visit   7/16/2020 BEBO Soto MD   Next Visit   1/19/2021 BEBO Soto MD   ED visits in past 90 days: 0     Note composed:12:37 PM 12/04/2020

## 2021-01-14 ENCOUNTER — LAB VISIT (OUTPATIENT)
Dept: LAB | Facility: HOSPITAL | Age: 86
End: 2021-01-14
Attending: FAMILY MEDICINE
Payer: MEDICARE

## 2021-01-14 DIAGNOSIS — E11.8 TYPE 2 DIABETES MELLITUS WITH COMPLICATION, WITHOUT LONG-TERM CURRENT USE OF INSULIN: ICD-10-CM

## 2021-01-14 LAB
ALBUMIN/CREAT UR: 27.2 UG/MG (ref 0–30)
CREAT UR-MCNC: 81 MG/DL (ref 15–325)
MICROALBUMIN UR DL<=1MG/L-MCNC: 22 UG/ML

## 2021-01-14 PROCEDURE — 82043 UR ALBUMIN QUANTITATIVE: CPT

## 2021-01-14 PROCEDURE — 82570 ASSAY OF URINE CREATININE: CPT

## 2021-01-19 ENCOUNTER — OFFICE VISIT (OUTPATIENT)
Dept: FAMILY MEDICINE | Facility: CLINIC | Age: 86
End: 2021-01-19
Payer: MEDICARE

## 2021-01-19 VITALS
BODY MASS INDEX: 29.16 KG/M2 | DIASTOLIC BLOOD PRESSURE: 64 MMHG | OXYGEN SATURATION: 98 % | WEIGHT: 144.63 LBS | HEIGHT: 59 IN | HEART RATE: 84 BPM | SYSTOLIC BLOOD PRESSURE: 128 MMHG

## 2021-01-19 DIAGNOSIS — I15.2 HYPERTENSION ASSOCIATED WITH DIABETES: ICD-10-CM

## 2021-01-19 DIAGNOSIS — E11.21 TYPE 2 DIABETES MELLITUS WITH DIABETIC NEPHROPATHY, WITHOUT LONG-TERM CURRENT USE OF INSULIN: Primary | ICD-10-CM

## 2021-01-19 DIAGNOSIS — I25.10 CORONARY ARTERY DISEASE, ANGINA PRESENCE UNSPECIFIED, UNSPECIFIED VESSEL OR LESION TYPE, UNSPECIFIED WHETHER NATIVE OR TRANSPLANTED HEART: ICD-10-CM

## 2021-01-19 DIAGNOSIS — E11.59 HYPERTENSION ASSOCIATED WITH DIABETES: ICD-10-CM

## 2021-01-19 DIAGNOSIS — G62.9 NEUROPATHY: ICD-10-CM

## 2021-01-19 PROCEDURE — 99214 PR OFFICE/OUTPT VISIT, EST, LEVL IV, 30-39 MIN: ICD-10-PCS | Mod: S$GLB,,, | Performed by: FAMILY MEDICINE

## 2021-01-19 PROCEDURE — 1126F AMNT PAIN NOTED NONE PRSNT: CPT | Mod: S$GLB,,, | Performed by: FAMILY MEDICINE

## 2021-01-19 PROCEDURE — 3288F PR FALLS RISK ASSESSMENT DOCUMENTED: ICD-10-PCS | Mod: S$GLB,,, | Performed by: FAMILY MEDICINE

## 2021-01-19 PROCEDURE — 99999 PR PBB SHADOW E&M-EST. PATIENT-LVL IV: CPT | Mod: PBBFAC,,, | Performed by: FAMILY MEDICINE

## 2021-01-19 PROCEDURE — 3078F DIAST BP <80 MM HG: CPT | Mod: S$GLB,,, | Performed by: FAMILY MEDICINE

## 2021-01-19 PROCEDURE — 3074F PR MOST RECENT SYSTOLIC BLOOD PRESSURE < 130 MM HG: ICD-10-PCS | Mod: S$GLB,,, | Performed by: FAMILY MEDICINE

## 2021-01-19 PROCEDURE — 1101F PT FALLS ASSESS-DOCD LE1/YR: CPT | Mod: S$GLB,,, | Performed by: FAMILY MEDICINE

## 2021-01-19 PROCEDURE — 3288F FALL RISK ASSESSMENT DOCD: CPT | Mod: S$GLB,,, | Performed by: FAMILY MEDICINE

## 2021-01-19 PROCEDURE — 1126F PR PAIN SEVERITY QUANTIFIED, NO PAIN PRESENT: ICD-10-PCS | Mod: S$GLB,,, | Performed by: FAMILY MEDICINE

## 2021-01-19 PROCEDURE — 1101F PR PT FALLS ASSESS DOC 0-1 FALLS W/OUT INJ PAST YR: ICD-10-PCS | Mod: S$GLB,,, | Performed by: FAMILY MEDICINE

## 2021-01-19 PROCEDURE — 3074F SYST BP LT 130 MM HG: CPT | Mod: S$GLB,,, | Performed by: FAMILY MEDICINE

## 2021-01-19 PROCEDURE — 99214 OFFICE O/P EST MOD 30 MIN: CPT | Mod: S$GLB,,, | Performed by: FAMILY MEDICINE

## 2021-01-19 PROCEDURE — 1159F PR MEDICATION LIST DOCUMENTED IN MEDICAL RECORD: ICD-10-PCS | Mod: S$GLB,,, | Performed by: FAMILY MEDICINE

## 2021-01-19 PROCEDURE — 99999 PR PBB SHADOW E&M-EST. PATIENT-LVL IV: ICD-10-PCS | Mod: PBBFAC,,, | Performed by: FAMILY MEDICINE

## 2021-01-19 PROCEDURE — 3078F PR MOST RECENT DIASTOLIC BLOOD PRESSURE < 80 MM HG: ICD-10-PCS | Mod: S$GLB,,, | Performed by: FAMILY MEDICINE

## 2021-01-19 PROCEDURE — 1159F MED LIST DOCD IN RCRD: CPT | Mod: S$GLB,,, | Performed by: FAMILY MEDICINE

## 2021-01-19 RX ORDER — GABAPENTIN 300 MG/1
300 CAPSULE ORAL DAILY
Qty: 90 CAPSULE | Refills: 3 | Status: SHIPPED | OUTPATIENT
Start: 2021-01-19 | End: 2022-01-31

## 2021-01-25 ENCOUNTER — LAB VISIT (OUTPATIENT)
Dept: LAB | Facility: HOSPITAL | Age: 86
End: 2021-01-25
Attending: INTERNAL MEDICINE

## 2021-01-25 DIAGNOSIS — Z79.02 LONG TERM (CURRENT) USE OF ANTITHROMBOTICS/ANTIPLATELETS: ICD-10-CM

## 2021-01-25 DIAGNOSIS — I25.5 ISCHEMIC CARDIOMYOPATHY: ICD-10-CM

## 2021-01-25 DIAGNOSIS — E78.00 HYPERCHOLESTEROLEMIA: ICD-10-CM

## 2021-01-25 DIAGNOSIS — I15.2 HYPERTENSION ASSOCIATED WITH DIABETES: ICD-10-CM

## 2021-01-25 DIAGNOSIS — E66.9 OBESITY, CLASS I, BMI 30-34.9: ICD-10-CM

## 2021-01-25 DIAGNOSIS — E11.59 HYPERTENSION ASSOCIATED WITH DIABETES: ICD-10-CM

## 2021-01-25 LAB
ALBUMIN SERPL BCP-MCNC: 3.6 G/DL (ref 3.5–5.2)
ALP SERPL-CCNC: 65 U/L (ref 55–135)
ALT SERPL W/O P-5'-P-CCNC: 11 U/L (ref 10–44)
ANION GAP SERPL CALC-SCNC: 8 MMOL/L (ref 8–16)
AST SERPL-CCNC: 16 U/L (ref 10–40)
BILIRUB SERPL-MCNC: 0.4 MG/DL (ref 0.1–1)
BUN SERPL-MCNC: 21 MG/DL (ref 8–23)
CALCIUM SERPL-MCNC: 8.9 MG/DL (ref 8.7–10.5)
CHLORIDE SERPL-SCNC: 104 MMOL/L (ref 95–110)
CO2 SERPL-SCNC: 27 MMOL/L (ref 23–29)
CREAT SERPL-MCNC: 1.3 MG/DL (ref 0.5–1.4)
EST. GFR  (AFRICAN AMERICAN): 43.5 ML/MIN/1.73 M^2
EST. GFR  (NON AFRICAN AMERICAN): 37.8 ML/MIN/1.73 M^2
GLUCOSE SERPL-MCNC: 117 MG/DL (ref 70–110)
POTASSIUM SERPL-SCNC: 4.8 MMOL/L (ref 3.5–5.1)
PROT SERPL-MCNC: 6.9 G/DL (ref 6–8.4)
SODIUM SERPL-SCNC: 139 MMOL/L (ref 136–145)

## 2021-01-25 PROCEDURE — 80053 COMPREHEN METABOLIC PANEL: CPT

## 2021-01-25 PROCEDURE — 36415 COLL VENOUS BLD VENIPUNCTURE: CPT | Mod: PO

## 2021-01-28 ENCOUNTER — PATIENT OUTREACH (OUTPATIENT)
Dept: ADMINISTRATIVE | Facility: OTHER | Age: 86
End: 2021-01-28

## 2021-02-01 ENCOUNTER — OFFICE VISIT (OUTPATIENT)
Dept: CARDIOLOGY | Facility: CLINIC | Age: 86
End: 2021-02-01
Payer: MEDICARE

## 2021-02-01 VITALS
RESPIRATION RATE: 14 BRPM | BODY MASS INDEX: 29.19 KG/M2 | DIASTOLIC BLOOD PRESSURE: 60 MMHG | HEIGHT: 59 IN | SYSTOLIC BLOOD PRESSURE: 132 MMHG | WEIGHT: 144.81 LBS | HEART RATE: 83 BPM

## 2021-02-01 DIAGNOSIS — E66.3 OVERWEIGHT (BMI 25.0-29.9): ICD-10-CM

## 2021-02-01 DIAGNOSIS — E78.5 TYPE 2 DIABETES MELLITUS WITH HYPERLIPIDEMIA: ICD-10-CM

## 2021-02-01 DIAGNOSIS — E11.69 TYPE 2 DIABETES MELLITUS WITH HYPERLIPIDEMIA: ICD-10-CM

## 2021-02-01 DIAGNOSIS — I25.5 ISCHEMIC CARDIOMYOPATHY: Primary | ICD-10-CM

## 2021-02-01 DIAGNOSIS — E11.59 HYPERTENSION ASSOCIATED WITH DIABETES: ICD-10-CM

## 2021-02-01 DIAGNOSIS — Z79.02 LONG TERM (CURRENT) USE OF ANTITHROMBOTICS/ANTIPLATELETS: ICD-10-CM

## 2021-02-01 DIAGNOSIS — N18.32 STAGE 3B CHRONIC KIDNEY DISEASE: ICD-10-CM

## 2021-02-01 DIAGNOSIS — I15.2 HYPERTENSION ASSOCIATED WITH DIABETES: ICD-10-CM

## 2021-02-01 PROCEDURE — 1159F PR MEDICATION LIST DOCUMENTED IN MEDICAL RECORD: ICD-10-PCS | Mod: S$GLB,,, | Performed by: INTERNAL MEDICINE

## 2021-02-01 PROCEDURE — 99999 PR PBB SHADOW E&M-EST. PATIENT-LVL V: CPT | Mod: PBBFAC,,, | Performed by: INTERNAL MEDICINE

## 2021-02-01 PROCEDURE — 3075F SYST BP GE 130 - 139MM HG: CPT | Mod: S$GLB,,, | Performed by: INTERNAL MEDICINE

## 2021-02-01 PROCEDURE — 1101F PT FALLS ASSESS-DOCD LE1/YR: CPT | Mod: S$GLB,,, | Performed by: INTERNAL MEDICINE

## 2021-02-01 PROCEDURE — 1126F AMNT PAIN NOTED NONE PRSNT: CPT | Mod: S$GLB,,, | Performed by: INTERNAL MEDICINE

## 2021-02-01 PROCEDURE — 3288F PR FALLS RISK ASSESSMENT DOCUMENTED: ICD-10-PCS | Mod: S$GLB,,, | Performed by: INTERNAL MEDICINE

## 2021-02-01 PROCEDURE — 99999 PR PBB SHADOW E&M-EST. PATIENT-LVL V: ICD-10-PCS | Mod: PBBFAC,,, | Performed by: INTERNAL MEDICINE

## 2021-02-01 PROCEDURE — 99214 PR OFFICE/OUTPT VISIT, EST, LEVL IV, 30-39 MIN: ICD-10-PCS | Mod: S$GLB,,, | Performed by: INTERNAL MEDICINE

## 2021-02-01 PROCEDURE — 3078F DIAST BP <80 MM HG: CPT | Mod: S$GLB,,, | Performed by: INTERNAL MEDICINE

## 2021-02-01 PROCEDURE — 3288F FALL RISK ASSESSMENT DOCD: CPT | Mod: S$GLB,,, | Performed by: INTERNAL MEDICINE

## 2021-02-01 PROCEDURE — 1159F MED LIST DOCD IN RCRD: CPT | Mod: S$GLB,,, | Performed by: INTERNAL MEDICINE

## 2021-02-01 PROCEDURE — 1101F PR PT FALLS ASSESS DOC 0-1 FALLS W/OUT INJ PAST YR: ICD-10-PCS | Mod: S$GLB,,, | Performed by: INTERNAL MEDICINE

## 2021-02-01 PROCEDURE — 1126F PR PAIN SEVERITY QUANTIFIED, NO PAIN PRESENT: ICD-10-PCS | Mod: S$GLB,,, | Performed by: INTERNAL MEDICINE

## 2021-02-01 PROCEDURE — 3075F PR MOST RECENT SYSTOLIC BLOOD PRESS GE 130-139MM HG: ICD-10-PCS | Mod: S$GLB,,, | Performed by: INTERNAL MEDICINE

## 2021-02-01 PROCEDURE — 3078F PR MOST RECENT DIASTOLIC BLOOD PRESSURE < 80 MM HG: ICD-10-PCS | Mod: S$GLB,,, | Performed by: INTERNAL MEDICINE

## 2021-02-01 PROCEDURE — 99214 OFFICE O/P EST MOD 30 MIN: CPT | Mod: S$GLB,,, | Performed by: INTERNAL MEDICINE

## 2021-02-01 RX ORDER — CLOPIDOGREL BISULFATE 75 MG/1
75 TABLET ORAL DAILY
Qty: 90 TABLET | Refills: 1 | Status: SHIPPED | OUTPATIENT
Start: 2021-02-01 | End: 2021-08-03

## 2021-02-01 RX ORDER — METOPROLOL SUCCINATE 25 MG/1
12.5 TABLET, EXTENDED RELEASE ORAL DAILY
Qty: 45 TABLET | Refills: 1 | Status: SHIPPED | OUTPATIENT
Start: 2021-02-01 | End: 2021-08-03

## 2021-02-19 DIAGNOSIS — E11.59 HYPERTENSION ASSOCIATED WITH DIABETES: ICD-10-CM

## 2021-02-19 DIAGNOSIS — I15.2 HYPERTENSION ASSOCIATED WITH DIABETES: ICD-10-CM

## 2021-02-19 DIAGNOSIS — E78.5 TYPE 2 DIABETES MELLITUS WITH HYPERLIPIDEMIA: ICD-10-CM

## 2021-02-19 DIAGNOSIS — E11.69 TYPE 2 DIABETES MELLITUS WITH HYPERLIPIDEMIA: ICD-10-CM

## 2021-02-22 RX ORDER — METFORMIN HYDROCHLORIDE 500 MG/1
TABLET ORAL
Qty: 90 TABLET | Refills: 1 | Status: SHIPPED | OUTPATIENT
Start: 2021-02-22 | End: 2021-08-27

## 2021-03-16 DIAGNOSIS — N39.3 SUI (STRESS URINARY INCONTINENCE, FEMALE): ICD-10-CM

## 2021-03-17 RX ORDER — OXYBUTYNIN CHLORIDE 10 MG/1
TABLET, EXTENDED RELEASE ORAL
Qty: 90 TABLET | Refills: 1 | Status: SHIPPED | OUTPATIENT
Start: 2021-03-17 | End: 2021-08-16

## 2021-04-29 ENCOUNTER — PATIENT MESSAGE (OUTPATIENT)
Dept: RESEARCH | Facility: HOSPITAL | Age: 86
End: 2021-04-29

## 2021-05-10 NOTE — TELEPHONE ENCOUNTER
HISTORY OF PRESENT ILLNESS Espinoza Love is a 73 y.o. female. HPI ESTABLISHED patient here for post op follow up, s/p RIGHT excisional biopsy/lumpectomy. RIGHT breast feels a little swollen. Incision dry and intact. No redness. Denies pain. 5/25/21 - Re-excision lumpectomy scheduled. Breast cancer history: 
4/27/21 - RIGHT breast excisional biopsy/lumpectomy -  Had papillomas excised Found dcis DCISRT pending Needs re-excision lumpectomy  
  
1/29/2021 - RIGHT breast biopsy revealed atypical intraductal papillary lesion. History of RIGHT breast biopsy done in 2019, benign per patient. Review of Systems All other systems reviewed and are negative. Physical Exam 
Vitals signs and nursing note reviewed. Chest:  
 
 
   Comments: Right breast incision healing well ASSESSMENT and PLAN 
  ICD-10-CM ICD-9-CM 1. Ductal carcinoma in situ (DCIS) of right breast  D05.11 233.0   
 
- healing well Will do re-excision Had excisional biopsy for papilloma  
dcis on surg path With + margins will re-exise Re-requested

## 2021-08-04 ENCOUNTER — PATIENT MESSAGE (OUTPATIENT)
Dept: ADMINISTRATIVE | Facility: HOSPITAL | Age: 86
End: 2021-08-04

## 2021-08-09 ENCOUNTER — LAB VISIT (OUTPATIENT)
Dept: LAB | Facility: HOSPITAL | Age: 86
End: 2021-08-09
Attending: NURSE PRACTITIONER
Payer: MEDICARE

## 2021-08-09 DIAGNOSIS — E11.21 TYPE 2 DIABETES MELLITUS WITH DIABETIC NEPHROPATHY, WITHOUT LONG-TERM CURRENT USE OF INSULIN: ICD-10-CM

## 2021-08-09 PROCEDURE — 83036 HEMOGLOBIN GLYCOSYLATED A1C: CPT | Performed by: FAMILY MEDICINE

## 2021-08-09 PROCEDURE — 36415 COLL VENOUS BLD VENIPUNCTURE: CPT | Mod: PO | Performed by: FAMILY MEDICINE

## 2021-08-10 LAB
ESTIMATED AVG GLUCOSE: 140 MG/DL (ref 68–131)
HBA1C MFR BLD: 6.5 % (ref 4–5.6)

## 2021-08-16 ENCOUNTER — OFFICE VISIT (OUTPATIENT)
Dept: FAMILY MEDICINE | Facility: CLINIC | Age: 86
End: 2021-08-16
Payer: MEDICARE

## 2021-08-16 ENCOUNTER — LAB VISIT (OUTPATIENT)
Dept: LAB | Facility: HOSPITAL | Age: 86
End: 2021-08-16
Attending: FAMILY MEDICINE
Payer: MEDICARE

## 2021-08-16 VITALS
BODY MASS INDEX: 28.17 KG/M2 | SYSTOLIC BLOOD PRESSURE: 112 MMHG | TEMPERATURE: 98 F | DIASTOLIC BLOOD PRESSURE: 66 MMHG | HEIGHT: 59 IN | WEIGHT: 139.75 LBS | OXYGEN SATURATION: 98 % | HEART RATE: 80 BPM

## 2021-08-16 DIAGNOSIS — E11.59 HYPERTENSION ASSOCIATED WITH DIABETES: Primary | ICD-10-CM

## 2021-08-16 DIAGNOSIS — E11.59 HYPERTENSION ASSOCIATED WITH DIABETES: ICD-10-CM

## 2021-08-16 DIAGNOSIS — E11.8 TYPE 2 DIABETES MELLITUS WITH COMPLICATION, WITHOUT LONG-TERM CURRENT USE OF INSULIN: ICD-10-CM

## 2021-08-16 DIAGNOSIS — I15.2 HYPERTENSION ASSOCIATED WITH DIABETES: ICD-10-CM

## 2021-08-16 DIAGNOSIS — N39.41 URINARY INCONTINENCE, URGE: ICD-10-CM

## 2021-08-16 DIAGNOSIS — R63.0 ANOREXIA: ICD-10-CM

## 2021-08-16 DIAGNOSIS — I15.2 HYPERTENSION ASSOCIATED WITH DIABETES: Primary | ICD-10-CM

## 2021-08-16 DIAGNOSIS — E78.00 HYPERCHOLESTEROLEMIA: ICD-10-CM

## 2021-08-16 LAB
ALBUMIN SERPL BCP-MCNC: 3.6 G/DL (ref 3.5–5.2)
ALP SERPL-CCNC: 67 U/L (ref 55–135)
ALT SERPL W/O P-5'-P-CCNC: 16 U/L (ref 10–44)
ANION GAP SERPL CALC-SCNC: 11 MMOL/L (ref 8–16)
AST SERPL-CCNC: 21 U/L (ref 10–40)
BILIRUB SERPL-MCNC: 0.4 MG/DL (ref 0.1–1)
BUN SERPL-MCNC: 18 MG/DL (ref 8–23)
CALCIUM SERPL-MCNC: 8.9 MG/DL (ref 8.7–10.5)
CHLORIDE SERPL-SCNC: 103 MMOL/L (ref 95–110)
CHOLEST SERPL-MCNC: 113 MG/DL (ref 120–199)
CHOLEST/HDLC SERPL: 3.3 {RATIO} (ref 2–5)
CO2 SERPL-SCNC: 23 MMOL/L (ref 23–29)
CREAT SERPL-MCNC: 1.2 MG/DL (ref 0.5–1.4)
EST. GFR  (AFRICAN AMERICAN): 47.6 ML/MIN/1.73 M^2
EST. GFR  (NON AFRICAN AMERICAN): 41.3 ML/MIN/1.73 M^2
GLUCOSE SERPL-MCNC: 125 MG/DL (ref 70–110)
HDLC SERPL-MCNC: 34 MG/DL (ref 40–75)
HDLC SERPL: 30.1 % (ref 20–50)
LDLC SERPL CALC-MCNC: 51.6 MG/DL (ref 63–159)
NONHDLC SERPL-MCNC: 79 MG/DL
POTASSIUM SERPL-SCNC: 4.6 MMOL/L (ref 3.5–5.1)
PROT SERPL-MCNC: 7.1 G/DL (ref 6–8.4)
SODIUM SERPL-SCNC: 137 MMOL/L (ref 136–145)
TRIGL SERPL-MCNC: 137 MG/DL (ref 30–150)
TSH SERPL DL<=0.005 MIU/L-ACNC: 2.15 UIU/ML (ref 0.4–4)

## 2021-08-16 PROCEDURE — 1160F PR REVIEW ALL MEDS BY PRESCRIBER/CLIN PHARMACIST DOCUMENTED: ICD-10-PCS | Mod: S$GLB,,, | Performed by: FAMILY MEDICINE

## 2021-08-16 PROCEDURE — 3078F DIAST BP <80 MM HG: CPT | Mod: S$GLB,,, | Performed by: FAMILY MEDICINE

## 2021-08-16 PROCEDURE — 1101F PR PT FALLS ASSESS DOC 0-1 FALLS W/OUT INJ PAST YR: ICD-10-PCS | Mod: S$GLB,,, | Performed by: FAMILY MEDICINE

## 2021-08-16 PROCEDURE — 3078F PR MOST RECENT DIASTOLIC BLOOD PRESSURE < 80 MM HG: ICD-10-PCS | Mod: S$GLB,,, | Performed by: FAMILY MEDICINE

## 2021-08-16 PROCEDURE — 1126F AMNT PAIN NOTED NONE PRSNT: CPT | Mod: S$GLB,,, | Performed by: FAMILY MEDICINE

## 2021-08-16 PROCEDURE — 99999 PR PBB SHADOW E&M-EST. PATIENT-LVL III: ICD-10-PCS | Mod: PBBFAC,,, | Performed by: FAMILY MEDICINE

## 2021-08-16 PROCEDURE — 3074F SYST BP LT 130 MM HG: CPT | Mod: S$GLB,,, | Performed by: FAMILY MEDICINE

## 2021-08-16 PROCEDURE — 99214 OFFICE O/P EST MOD 30 MIN: CPT | Mod: S$GLB,,, | Performed by: FAMILY MEDICINE

## 2021-08-16 PROCEDURE — 1160F RVW MEDS BY RX/DR IN RCRD: CPT | Mod: S$GLB,,, | Performed by: FAMILY MEDICINE

## 2021-08-16 PROCEDURE — 99214 PR OFFICE/OUTPT VISIT, EST, LEVL IV, 30-39 MIN: ICD-10-PCS | Mod: S$GLB,,, | Performed by: FAMILY MEDICINE

## 2021-08-16 PROCEDURE — 1126F PR PAIN SEVERITY QUANTIFIED, NO PAIN PRESENT: ICD-10-PCS | Mod: S$GLB,,, | Performed by: FAMILY MEDICINE

## 2021-08-16 PROCEDURE — 85025 COMPLETE CBC W/AUTO DIFF WBC: CPT | Performed by: FAMILY MEDICINE

## 2021-08-16 PROCEDURE — 80061 LIPID PANEL: CPT | Performed by: FAMILY MEDICINE

## 2021-08-16 PROCEDURE — 1159F MED LIST DOCD IN RCRD: CPT | Mod: S$GLB,,, | Performed by: FAMILY MEDICINE

## 2021-08-16 PROCEDURE — 99999 PR PBB SHADOW E&M-EST. PATIENT-LVL III: CPT | Mod: PBBFAC,,, | Performed by: FAMILY MEDICINE

## 2021-08-16 PROCEDURE — 3288F FALL RISK ASSESSMENT DOCD: CPT | Mod: S$GLB,,, | Performed by: FAMILY MEDICINE

## 2021-08-16 PROCEDURE — 80053 COMPREHEN METABOLIC PANEL: CPT | Performed by: FAMILY MEDICINE

## 2021-08-16 PROCEDURE — 84443 ASSAY THYROID STIM HORMONE: CPT | Performed by: FAMILY MEDICINE

## 2021-08-16 PROCEDURE — 1159F PR MEDICATION LIST DOCUMENTED IN MEDICAL RECORD: ICD-10-PCS | Mod: S$GLB,,, | Performed by: FAMILY MEDICINE

## 2021-08-16 PROCEDURE — 1101F PT FALLS ASSESS-DOCD LE1/YR: CPT | Mod: S$GLB,,, | Performed by: FAMILY MEDICINE

## 2021-08-16 PROCEDURE — 3288F PR FALLS RISK ASSESSMENT DOCUMENTED: ICD-10-PCS | Mod: S$GLB,,, | Performed by: FAMILY MEDICINE

## 2021-08-16 PROCEDURE — 3074F PR MOST RECENT SYSTOLIC BLOOD PRESSURE < 130 MM HG: ICD-10-PCS | Mod: S$GLB,,, | Performed by: FAMILY MEDICINE

## 2021-08-16 PROCEDURE — 36415 COLL VENOUS BLD VENIPUNCTURE: CPT | Mod: PO | Performed by: FAMILY MEDICINE

## 2021-08-16 RX ORDER — MIRABEGRON 50 MG/1
50 TABLET, FILM COATED, EXTENDED RELEASE ORAL DAILY
Qty: 90 TABLET | Refills: 1 | Status: SHIPPED | OUTPATIENT
Start: 2021-08-16 | End: 2022-02-14 | Stop reason: SDUPTHER

## 2021-08-17 LAB
BASOPHILS # BLD AUTO: 0.03 K/UL (ref 0–0.2)
BASOPHILS NFR BLD: 0.3 % (ref 0–1.9)
DIFFERENTIAL METHOD: ABNORMAL
EOSINOPHIL # BLD AUTO: 0.2 K/UL (ref 0–0.5)
EOSINOPHIL NFR BLD: 2.3 % (ref 0–8)
ERYTHROCYTE [DISTWIDTH] IN BLOOD BY AUTOMATED COUNT: 13.1 % (ref 11.5–14.5)
HCT VFR BLD AUTO: 34.4 % (ref 37–48.5)
HGB BLD-MCNC: 10.8 G/DL (ref 12–16)
IMM GRANULOCYTES # BLD AUTO: 0.02 K/UL (ref 0–0.04)
IMM GRANULOCYTES NFR BLD AUTO: 0.2 % (ref 0–0.5)
LYMPHOCYTES # BLD AUTO: 2 K/UL (ref 1–4.8)
LYMPHOCYTES NFR BLD: 21.2 % (ref 18–48)
MCH RBC QN AUTO: 29.5 PG (ref 27–31)
MCHC RBC AUTO-ENTMCNC: 31.4 G/DL (ref 32–36)
MCV RBC AUTO: 94 FL (ref 82–98)
MONOCYTES # BLD AUTO: 0.6 K/UL (ref 0.3–1)
MONOCYTES NFR BLD: 6.7 % (ref 4–15)
NEUTROPHILS # BLD AUTO: 6.6 K/UL (ref 1.8–7.7)
NEUTROPHILS NFR BLD: 69.3 % (ref 38–73)
NRBC BLD-RTO: 0 /100 WBC
PLATELET # BLD AUTO: 260 K/UL (ref 150–450)
PMV BLD AUTO: 10.7 FL (ref 9.2–12.9)
RBC # BLD AUTO: 3.66 M/UL (ref 4–5.4)
WBC # BLD AUTO: 9.51 K/UL (ref 3.9–12.7)

## 2021-08-26 DIAGNOSIS — E78.5 TYPE 2 DIABETES MELLITUS WITH HYPERLIPIDEMIA: ICD-10-CM

## 2021-08-26 DIAGNOSIS — E11.69 TYPE 2 DIABETES MELLITUS WITH HYPERLIPIDEMIA: ICD-10-CM

## 2021-08-26 DIAGNOSIS — E11.59 HYPERTENSION ASSOCIATED WITH DIABETES: ICD-10-CM

## 2021-08-26 DIAGNOSIS — I15.2 HYPERTENSION ASSOCIATED WITH DIABETES: ICD-10-CM

## 2021-08-27 RX ORDER — METFORMIN HYDROCHLORIDE 500 MG/1
TABLET ORAL
Qty: 90 TABLET | Refills: 1 | Status: SHIPPED | OUTPATIENT
Start: 2021-08-27 | End: 2021-12-02

## 2021-10-18 ENCOUNTER — PATIENT MESSAGE (OUTPATIENT)
Dept: ADMINISTRATIVE | Facility: HOSPITAL | Age: 86
End: 2021-10-18
Payer: MEDICARE

## 2022-01-07 ENCOUNTER — PATIENT MESSAGE (OUTPATIENT)
Dept: FAMILY MEDICINE | Facility: CLINIC | Age: 87
End: 2022-01-07
Payer: MEDICARE

## 2022-01-27 NOTE — TELEPHONE ENCOUNTER
No new care gaps identified.  Powered by [employer]. Reference number: [id number].   1/27/2022 5:36:32 PM CST

## 2022-01-31 RX ORDER — GABAPENTIN 300 MG/1
CAPSULE ORAL
Qty: 90 CAPSULE | Refills: 3 | Status: SHIPPED | OUTPATIENT
Start: 2022-01-31 | End: 2023-01-20 | Stop reason: SDUPTHER

## 2022-02-22 ENCOUNTER — TELEPHONE (OUTPATIENT)
Dept: FAMILY MEDICINE | Facility: CLINIC | Age: 87
End: 2022-02-22
Payer: MEDICARE

## 2022-02-22 DIAGNOSIS — E11.21 TYPE 2 DIABETES MELLITUS WITH DIABETIC NEPHROPATHY, WITHOUT LONG-TERM CURRENT USE OF INSULIN: ICD-10-CM

## 2022-02-22 DIAGNOSIS — I15.2 HYPERTENSION ASSOCIATED WITH DIABETES: Primary | ICD-10-CM

## 2022-02-22 DIAGNOSIS — D64.9 ANEMIA, UNSPECIFIED TYPE: ICD-10-CM

## 2022-02-22 DIAGNOSIS — E11.59 HYPERTENSION ASSOCIATED WITH DIABETES: Primary | ICD-10-CM

## 2022-02-22 DIAGNOSIS — N18.32 STAGE 3B CHRONIC KIDNEY DISEASE: ICD-10-CM

## 2022-02-22 NOTE — TELEPHONE ENCOUNTER
----- Message from Zuleyma Fernandes sent at 2/22/2022  9:18 AM CST -----  Contact: patient  Type: Needs Medical Advice  Who Called:  patient   Symptoms (please be specific):    How long has patient had these symptoms:    Pharmacy name and phone #:    Best Call Back Number: [phone] (home)     Additional Information: patient scheduled an appointment for 03/03/22 is requesting labs prior, please contact to advise.

## 2022-02-24 ENCOUNTER — LAB VISIT (OUTPATIENT)
Dept: LAB | Facility: HOSPITAL | Age: 87
End: 2022-02-24
Attending: FAMILY MEDICINE
Payer: MEDICARE

## 2022-02-24 DIAGNOSIS — I15.2 HYPERTENSION ASSOCIATED WITH DIABETES: ICD-10-CM

## 2022-02-24 DIAGNOSIS — N18.32 STAGE 3B CHRONIC KIDNEY DISEASE: ICD-10-CM

## 2022-02-24 DIAGNOSIS — E11.21 TYPE 2 DIABETES MELLITUS WITH DIABETIC NEPHROPATHY, WITHOUT LONG-TERM CURRENT USE OF INSULIN: ICD-10-CM

## 2022-02-24 DIAGNOSIS — E11.59 HYPERTENSION ASSOCIATED WITH DIABETES: ICD-10-CM

## 2022-02-24 DIAGNOSIS — D64.9 ANEMIA, UNSPECIFIED TYPE: ICD-10-CM

## 2022-02-24 LAB
ALBUMIN SERPL BCP-MCNC: 3.6 G/DL (ref 3.5–5.2)
ALP SERPL-CCNC: 65 U/L (ref 55–135)
ALT SERPL W/O P-5'-P-CCNC: 15 U/L (ref 10–44)
ANION GAP SERPL CALC-SCNC: 9 MMOL/L (ref 8–16)
AST SERPL-CCNC: 19 U/L (ref 10–40)
BASOPHILS # BLD AUTO: 0.03 K/UL (ref 0–0.2)
BASOPHILS NFR BLD: 0.4 % (ref 0–1.9)
BILIRUB SERPL-MCNC: 0.6 MG/DL (ref 0.1–1)
BUN SERPL-MCNC: 17 MG/DL (ref 8–23)
CALCIUM SERPL-MCNC: 9.6 MG/DL (ref 8.7–10.5)
CHLORIDE SERPL-SCNC: 101 MMOL/L (ref 95–110)
CO2 SERPL-SCNC: 30 MMOL/L (ref 23–29)
CREAT SERPL-MCNC: 1.3 MG/DL (ref 0.5–1.4)
DIFFERENTIAL METHOD: ABNORMAL
EOSINOPHIL # BLD AUTO: 0.2 K/UL (ref 0–0.5)
EOSINOPHIL NFR BLD: 2.3 % (ref 0–8)
ERYTHROCYTE [DISTWIDTH] IN BLOOD BY AUTOMATED COUNT: 13.6 % (ref 11.5–14.5)
EST. GFR  (AFRICAN AMERICAN): 43.2 ML/MIN/1.73 M^2
EST. GFR  (NON AFRICAN AMERICAN): 37.5 ML/MIN/1.73 M^2
ESTIMATED AVG GLUCOSE: 157 MG/DL (ref 68–131)
GLUCOSE SERPL-MCNC: 117 MG/DL (ref 70–110)
HBA1C MFR BLD: 7.1 % (ref 4–5.6)
HCT VFR BLD AUTO: 34 % (ref 37–48.5)
HGB BLD-MCNC: 10.9 G/DL (ref 12–16)
IMM GRANULOCYTES # BLD AUTO: 0.02 K/UL (ref 0–0.04)
IMM GRANULOCYTES NFR BLD AUTO: 0.2 % (ref 0–0.5)
LYMPHOCYTES # BLD AUTO: 1.7 K/UL (ref 1–4.8)
LYMPHOCYTES NFR BLD: 20.4 % (ref 18–48)
MCH RBC QN AUTO: 30.1 PG (ref 27–31)
MCHC RBC AUTO-ENTMCNC: 32.1 G/DL (ref 32–36)
MCV RBC AUTO: 94 FL (ref 82–98)
MONOCYTES # BLD AUTO: 0.6 K/UL (ref 0.3–1)
MONOCYTES NFR BLD: 7.1 % (ref 4–15)
NEUTROPHILS # BLD AUTO: 5.9 K/UL (ref 1.8–7.7)
NEUTROPHILS NFR BLD: 69.6 % (ref 38–73)
NRBC BLD-RTO: 0 /100 WBC
PLATELET # BLD AUTO: 245 K/UL (ref 150–450)
PMV BLD AUTO: 10.6 FL (ref 9.2–12.9)
POTASSIUM SERPL-SCNC: 4.8 MMOL/L (ref 3.5–5.1)
PROT SERPL-MCNC: 6.7 G/DL (ref 6–8.4)
RBC # BLD AUTO: 3.62 M/UL (ref 4–5.4)
SODIUM SERPL-SCNC: 140 MMOL/L (ref 136–145)
WBC # BLD AUTO: 8.4 K/UL (ref 3.9–12.7)

## 2022-02-24 PROCEDURE — 83036 HEMOGLOBIN GLYCOSYLATED A1C: CPT | Performed by: FAMILY MEDICINE

## 2022-02-24 PROCEDURE — 80053 COMPREHEN METABOLIC PANEL: CPT | Performed by: FAMILY MEDICINE

## 2022-02-24 PROCEDURE — 36415 COLL VENOUS BLD VENIPUNCTURE: CPT | Mod: PO | Performed by: FAMILY MEDICINE

## 2022-02-24 PROCEDURE — 85025 COMPLETE CBC W/AUTO DIFF WBC: CPT | Performed by: FAMILY MEDICINE

## 2022-02-25 DIAGNOSIS — E11.8 TYPE 2 DIABETES MELLITUS WITH COMPLICATION, WITHOUT LONG-TERM CURRENT USE OF INSULIN: ICD-10-CM

## 2022-02-25 NOTE — TELEPHONE ENCOUNTER
No new care gaps identified.  Powered by [employer]. Reference number: [id number].   2/25/2022 8:07:50 AM CST

## 2022-03-01 DIAGNOSIS — E11.59 HYPERTENSION ASSOCIATED WITH DIABETES: ICD-10-CM

## 2022-03-01 DIAGNOSIS — E11.69 TYPE 2 DIABETES MELLITUS WITH HYPERLIPIDEMIA: ICD-10-CM

## 2022-03-01 DIAGNOSIS — I15.2 HYPERTENSION ASSOCIATED WITH DIABETES: ICD-10-CM

## 2022-03-01 DIAGNOSIS — E78.5 TYPE 2 DIABETES MELLITUS WITH HYPERLIPIDEMIA: ICD-10-CM

## 2022-03-01 RX ORDER — BLOOD-GLUCOSE METER
EACH MISCELLANEOUS
Qty: 200 EACH | Refills: 3 | Status: SHIPPED | OUTPATIENT
Start: 2022-03-01 | End: 2023-03-13 | Stop reason: SDUPTHER

## 2022-03-01 NOTE — TELEPHONE ENCOUNTER
Refill Authorization Note   Dolores LeJeune  is requesting a refill authorization.  Brief Assessment and Rationale for Refill:  Approve     Medication Therapy Plan:       Medication Reconciliation Completed: No   Comments:   --->Care Gap information included below if applicable.   Orders Placed This Encounter    ONETOUCH VERIO TEST STRIPS Strp      Requested Prescriptions   Signed Prescriptions Disp Refills    ONETOUCH VERIO TEST STRIPS Strp 200 each 3     Sig: USE ONE STRIP FOR TESTING TWICE DAILY       Endocrinology: Diabetes - Supplies Passed - 2/25/2022  8:07 AM        Passed - Patient is at least 18 years old        Passed - Valid encounter within last 15 months     Recent Visits  Date Type Provider Dept   08/16/21 Office Visit BEBO Soto MD [facility]   01/19/21 Office Visit BEBO Soto MD [facility]   07/16/20 Office Visit BEBO Soto MD [facility]   Showing recent visits within past 720 days and meeting all other requirements  Future Appointments  No visits were found meeting these conditions.  Showing future appointments within next 150 days and meeting all other requirements      Future Appointments              In 2 days BEBO Soto MD [facility]                Passed - HBA1C within 1080 days     Lab Results   Component Value Date    HGBA1C 7.1 (H) 02/24/2022    HGBA1C 6.5 (H) 08/09/2021    HGBA1C 5.9 (H) 01/14/2021                  Appointments  past 12m or future 3m with PCP    Date Provider   Last Visit   8/16/2021 BEBO Soto MD   Next Visit   3/1/2022 BEBO Soto MD   ED visits in past 90 days: 0     Note composed:2:51 PM 03/01/2022

## 2022-03-01 NOTE — TELEPHONE ENCOUNTER
No new care gaps identified.  Powered by [email or web address]. Reference number: [id number].   3/01/2022 10:26:34 AM CST

## 2022-03-02 RX ORDER — METFORMIN HYDROCHLORIDE 500 MG/1
TABLET ORAL
Qty: 90 TABLET | Refills: 0 | Status: SHIPPED | OUTPATIENT
Start: 2022-03-02 | End: 2022-06-06 | Stop reason: SDUPTHER

## 2022-03-03 ENCOUNTER — OFFICE VISIT (OUTPATIENT)
Dept: FAMILY MEDICINE | Facility: CLINIC | Age: 87
End: 2022-03-03
Payer: MEDICARE

## 2022-03-03 VITALS
WEIGHT: 142.63 LBS | DIASTOLIC BLOOD PRESSURE: 56 MMHG | SYSTOLIC BLOOD PRESSURE: 104 MMHG | OXYGEN SATURATION: 98 % | BODY MASS INDEX: 28.76 KG/M2 | HEART RATE: 77 BPM | HEIGHT: 59 IN

## 2022-03-03 DIAGNOSIS — F43.21 GRIEF REACTION: Primary | ICD-10-CM

## 2022-03-03 DIAGNOSIS — Z12.31 ENCOUNTER FOR SCREENING MAMMOGRAM FOR MALIGNANT NEOPLASM OF BREAST: ICD-10-CM

## 2022-03-03 PROCEDURE — 1126F AMNT PAIN NOTED NONE PRSNT: CPT | Mod: CPTII,S$GLB,, | Performed by: FAMILY MEDICINE

## 2022-03-03 PROCEDURE — 1160F RVW MEDS BY RX/DR IN RCRD: CPT | Mod: CPTII,S$GLB,, | Performed by: FAMILY MEDICINE

## 2022-03-03 PROCEDURE — 1160F PR REVIEW ALL MEDS BY PRESCRIBER/CLIN PHARMACIST DOCUMENTED: ICD-10-PCS | Mod: CPTII,S$GLB,, | Performed by: FAMILY MEDICINE

## 2022-03-03 PROCEDURE — 1126F PR PAIN SEVERITY QUANTIFIED, NO PAIN PRESENT: ICD-10-PCS | Mod: CPTII,S$GLB,, | Performed by: FAMILY MEDICINE

## 2022-03-03 PROCEDURE — 3078F DIAST BP <80 MM HG: CPT | Mod: CPTII,S$GLB,, | Performed by: FAMILY MEDICINE

## 2022-03-03 PROCEDURE — 3078F PR MOST RECENT DIASTOLIC BLOOD PRESSURE < 80 MM HG: ICD-10-PCS | Mod: CPTII,S$GLB,, | Performed by: FAMILY MEDICINE

## 2022-03-03 PROCEDURE — 1159F PR MEDICATION LIST DOCUMENTED IN MEDICAL RECORD: ICD-10-PCS | Mod: CPTII,S$GLB,, | Performed by: FAMILY MEDICINE

## 2022-03-03 PROCEDURE — 99999 PR PBB SHADOW E&M-EST. PATIENT-LVL IV: ICD-10-PCS | Mod: PBBFAC,,, | Performed by: FAMILY MEDICINE

## 2022-03-03 PROCEDURE — 3288F PR FALLS RISK ASSESSMENT DOCUMENTED: ICD-10-PCS | Mod: CPTII,S$GLB,, | Performed by: FAMILY MEDICINE

## 2022-03-03 PROCEDURE — 1101F PT FALLS ASSESS-DOCD LE1/YR: CPT | Mod: CPTII,S$GLB,, | Performed by: FAMILY MEDICINE

## 2022-03-03 PROCEDURE — 3074F PR MOST RECENT SYSTOLIC BLOOD PRESSURE < 130 MM HG: ICD-10-PCS | Mod: CPTII,S$GLB,, | Performed by: FAMILY MEDICINE

## 2022-03-03 PROCEDURE — 1101F PR PT FALLS ASSESS DOC 0-1 FALLS W/OUT INJ PAST YR: ICD-10-PCS | Mod: CPTII,S$GLB,, | Performed by: FAMILY MEDICINE

## 2022-03-03 PROCEDURE — 99214 OFFICE O/P EST MOD 30 MIN: CPT | Mod: S$GLB,,, | Performed by: FAMILY MEDICINE

## 2022-03-03 PROCEDURE — 99214 PR OFFICE/OUTPT VISIT, EST, LEVL IV, 30-39 MIN: ICD-10-PCS | Mod: S$GLB,,, | Performed by: FAMILY MEDICINE

## 2022-03-03 PROCEDURE — 99999 PR PBB SHADOW E&M-EST. PATIENT-LVL IV: CPT | Mod: PBBFAC,,, | Performed by: FAMILY MEDICINE

## 2022-03-03 PROCEDURE — 3288F FALL RISK ASSESSMENT DOCD: CPT | Mod: CPTII,S$GLB,, | Performed by: FAMILY MEDICINE

## 2022-03-03 PROCEDURE — 1159F MED LIST DOCD IN RCRD: CPT | Mod: CPTII,S$GLB,, | Performed by: FAMILY MEDICINE

## 2022-03-03 PROCEDURE — 3074F SYST BP LT 130 MM HG: CPT | Mod: CPTII,S$GLB,, | Performed by: FAMILY MEDICINE

## 2022-03-03 NOTE — PROGRESS NOTES
"Subjective:       Patient ID: Dolores B LeJeune is a 86 y.o. female.    Chief Complaint: Routine Health Maintenance    Pt is known to me.  Pt is here for followup of chronic medical issues.  The pt's  recently  and she is grieving.  She has excellent family support and a group of friends that she is close to.    Review of Systems   Constitutional: Negative for activity change, appetite change, fatigue and unexpected weight change.   Eyes: Negative for visual disturbance.   Respiratory: Negative for cough, chest tightness and shortness of breath.    Cardiovascular: Positive for leg swelling. Negative for chest pain and palpitations.   Gastrointestinal: Negative for abdominal pain, constipation, diarrhea, nausea and vomiting.   Endocrine: Negative for cold intolerance, heat intolerance and polyuria.   Genitourinary: Positive for enuresis (when she holds her urine). Negative for decreased urine volume, dysuria and urgency.   Musculoskeletal: Positive for arthralgias. Negative for back pain.   Skin: Negative for rash.   Neurological: Negative for numbness and headaches.   Psychiatric/Behavioral: Positive for dysphoric mood. Negative for confusion, sleep disturbance and suicidal ideas.       Objective:       Vitals:    22 1307   BP: (!) 104/56   Pulse: 77   SpO2: 98%   Weight: 64.7 kg (142 lb 10.2 oz)   Height: 4' 11" (1.499 m)     Physical Exam  Vitals and nursing note reviewed.   Constitutional:       Appearance: She is well-developed.   HENT:      Head: Normocephalic.   Eyes:      Conjunctiva/sclera: Conjunctivae normal.      Pupils: Pupils are equal, round, and reactive to light.   Neck:      Thyroid: No thyromegaly.   Cardiovascular:      Rate and Rhythm: Normal rate and regular rhythm.      Pulses:           Dorsalis pedis pulses are 2+ on the right side and 2+ on the left side.        Posterior tibial pulses are 2+ on the right side and 2+ on the left side.      Heart sounds: Normal heart sounds. "   Pulmonary:      Effort: Pulmonary effort is normal.      Breath sounds: Normal breath sounds.   Abdominal:      General: Bowel sounds are normal.      Palpations: Abdomen is soft.      Tenderness: There is no abdominal tenderness.   Musculoskeletal:         General: No tenderness or deformity. Normal range of motion.      Cervical back: Normal range of motion and neck supple.      Right foot: Normal range of motion. No deformity.      Left foot: Normal range of motion. No deformity.   Feet:      Right foot:      Protective Sensation: 7 sites tested. 7 sites sensed.      Skin integrity: No ulcer.      Left foot:      Protective Sensation: 7 sites tested. 7 sites sensed.      Skin integrity: No ulcer.   Lymphadenopathy:      Cervical: No cervical adenopathy.   Skin:     General: Skin is warm and dry.   Neurological:      Mental Status: She is alert and oriented to person, place, and time.      Cranial Nerves: No cranial nerve deficit.      Motor: No abnormal muscle tone.      Coordination: Coordination normal.      Deep Tendon Reflexes: Reflexes normal.   Psychiatric:         Behavior: Behavior normal.      Comments: Tearful when talking about her          Assessment:       1. Grief reaction    2. Encounter for screening mammogram for malignant neoplasm of breast        Plan:       Kelly was seen today for routine health maintenance.    Diagnoses and all orders for this visit:    Grief reaction    Encounter for screening mammogram for malignant neoplasm of breast  -     Mammo Digital Screening Bilat; Future      During this visit, I reviewed the pt's history, medications, allergies, and problem list.    The pt does not want any antidepressant med nor counseling now.  She understands the stages of grief and relies upon her family/friend support.

## 2022-03-21 ENCOUNTER — TELEPHONE (OUTPATIENT)
Dept: FAMILY MEDICINE | Facility: CLINIC | Age: 87
End: 2022-03-21
Payer: MEDICARE

## 2022-03-21 NOTE — TELEPHONE ENCOUNTER
----- Message from BEBO Soto MD sent at 3/20/2022  5:32 PM CDT -----  Let pt know labs look good.

## 2022-05-09 ENCOUNTER — PATIENT MESSAGE (OUTPATIENT)
Dept: SMOKING CESSATION | Facility: CLINIC | Age: 87
End: 2022-05-09
Payer: MEDICARE

## 2022-05-11 ENCOUNTER — OFFICE VISIT (OUTPATIENT)
Dept: FAMILY MEDICINE | Facility: CLINIC | Age: 87
End: 2022-05-11
Payer: MEDICARE

## 2022-05-11 ENCOUNTER — HOSPITAL ENCOUNTER (OUTPATIENT)
Dept: RADIOLOGY | Facility: HOSPITAL | Age: 87
Discharge: HOME | End: 2022-05-11
Attending: FAMILY MEDICINE
Payer: MEDICARE

## 2022-05-11 VITALS
BODY MASS INDEX: 29.07 KG/M2 | HEART RATE: 81 BPM | WEIGHT: 144.19 LBS | OXYGEN SATURATION: 96 % | SYSTOLIC BLOOD PRESSURE: 112 MMHG | DIASTOLIC BLOOD PRESSURE: 50 MMHG | HEIGHT: 59 IN

## 2022-05-11 DIAGNOSIS — Z01.818 PRE-OP EVALUATION: Primary | ICD-10-CM

## 2022-05-11 DIAGNOSIS — Z12.31 ENCOUNTER FOR SCREENING MAMMOGRAM FOR MALIGNANT NEOPLASM OF BREAST: ICD-10-CM

## 2022-05-11 DIAGNOSIS — H25.9 SENILE CATARACT OF RIGHT EYE, UNSPECIFIED AGE-RELATED CATARACT TYPE: ICD-10-CM

## 2022-05-11 PROCEDURE — 3288F FALL RISK ASSESSMENT DOCD: CPT | Mod: CPTII,S$GLB,, | Performed by: FAMILY MEDICINE

## 2022-05-11 PROCEDURE — 77063 BREAST TOMOSYNTHESIS BI: CPT | Mod: TC,PO

## 2022-05-11 PROCEDURE — 3078F PR MOST RECENT DIASTOLIC BLOOD PRESSURE < 80 MM HG: ICD-10-PCS | Mod: CPTII,S$GLB,, | Performed by: FAMILY MEDICINE

## 2022-05-11 PROCEDURE — 3074F PR MOST RECENT SYSTOLIC BLOOD PRESSURE < 130 MM HG: ICD-10-PCS | Mod: CPTII,S$GLB,, | Performed by: FAMILY MEDICINE

## 2022-05-11 PROCEDURE — 3074F SYST BP LT 130 MM HG: CPT | Mod: CPTII,S$GLB,, | Performed by: FAMILY MEDICINE

## 2022-05-11 PROCEDURE — 1159F PR MEDICATION LIST DOCUMENTED IN MEDICAL RECORD: ICD-10-PCS | Mod: CPTII,S$GLB,, | Performed by: FAMILY MEDICINE

## 2022-05-11 PROCEDURE — 77067 SCR MAMMO BI INCL CAD: CPT | Mod: TC,PO

## 2022-05-11 PROCEDURE — 99999 PR PBB SHADOW E&M-EST. PATIENT-LVL IV: ICD-10-PCS | Mod: PBBFAC,,, | Performed by: FAMILY MEDICINE

## 2022-05-11 PROCEDURE — 1101F PR PT FALLS ASSESS DOC 0-1 FALLS W/OUT INJ PAST YR: ICD-10-PCS | Mod: CPTII,S$GLB,, | Performed by: FAMILY MEDICINE

## 2022-05-11 PROCEDURE — 1126F AMNT PAIN NOTED NONE PRSNT: CPT | Mod: CPTII,S$GLB,, | Performed by: FAMILY MEDICINE

## 2022-05-11 PROCEDURE — 77063 MAMMO DIGITAL SCREENING BILAT WITH TOMO: ICD-10-PCS | Mod: 26,,, | Performed by: RADIOLOGY

## 2022-05-11 PROCEDURE — 77067 SCR MAMMO BI INCL CAD: CPT | Mod: 26,,, | Performed by: RADIOLOGY

## 2022-05-11 PROCEDURE — 1159F MED LIST DOCD IN RCRD: CPT | Mod: CPTII,S$GLB,, | Performed by: FAMILY MEDICINE

## 2022-05-11 PROCEDURE — 1126F PR PAIN SEVERITY QUANTIFIED, NO PAIN PRESENT: ICD-10-PCS | Mod: CPTII,S$GLB,, | Performed by: FAMILY MEDICINE

## 2022-05-11 PROCEDURE — 3078F DIAST BP <80 MM HG: CPT | Mod: CPTII,S$GLB,, | Performed by: FAMILY MEDICINE

## 2022-05-11 PROCEDURE — 1101F PT FALLS ASSESS-DOCD LE1/YR: CPT | Mod: CPTII,S$GLB,, | Performed by: FAMILY MEDICINE

## 2022-05-11 PROCEDURE — 3288F PR FALLS RISK ASSESSMENT DOCUMENTED: ICD-10-PCS | Mod: CPTII,S$GLB,, | Performed by: FAMILY MEDICINE

## 2022-05-11 PROCEDURE — 99214 OFFICE O/P EST MOD 30 MIN: CPT | Mod: S$GLB,,, | Performed by: FAMILY MEDICINE

## 2022-05-11 PROCEDURE — 99214 PR OFFICE/OUTPT VISIT, EST, LEVL IV, 30-39 MIN: ICD-10-PCS | Mod: S$GLB,,, | Performed by: FAMILY MEDICINE

## 2022-05-11 PROCEDURE — 1160F PR REVIEW ALL MEDS BY PRESCRIBER/CLIN PHARMACIST DOCUMENTED: ICD-10-PCS | Mod: CPTII,S$GLB,, | Performed by: FAMILY MEDICINE

## 2022-05-11 PROCEDURE — 1160F RVW MEDS BY RX/DR IN RCRD: CPT | Mod: CPTII,S$GLB,, | Performed by: FAMILY MEDICINE

## 2022-05-11 PROCEDURE — 77067 MAMMO DIGITAL SCREENING BILAT WITH TOMO: ICD-10-PCS | Mod: 26,,, | Performed by: RADIOLOGY

## 2022-05-11 PROCEDURE — 99999 PR PBB SHADOW E&M-EST. PATIENT-LVL IV: CPT | Mod: PBBFAC,,, | Performed by: FAMILY MEDICINE

## 2022-05-11 PROCEDURE — 77063 BREAST TOMOSYNTHESIS BI: CPT | Mod: 26,,, | Performed by: RADIOLOGY

## 2022-05-11 NOTE — PROGRESS NOTES
"Subjective:       Patient ID: Dolores B LeJeune is a 86 y.o. female.    Chief Complaint: Pre-op Exam (Cataract surgery)    Pt is known to me.  The pt presents for pre op for cataract surgery.  She sees Dr. Galo in [place].  The pt is doing well in general.  Her glucoses are controlled.   She says her BP readings at home are "alright."      Review of Systems   Constitutional: Negative for activity change, appetite change, fatigue and unexpected weight change.   Eyes: Negative for visual disturbance.   Respiratory: Negative for cough, chest tightness and shortness of breath.    Cardiovascular: Positive for leg swelling (chronic late day). Negative for chest pain and palpitations.   Gastrointestinal: Negative for abdominal pain, constipation, diarrhea, nausea and vomiting.   Endocrine: Negative for cold intolerance, heat intolerance and polyuria.   Genitourinary: Positive for enuresis (when she holds her urine). Negative for decreased urine volume, dysuria and urgency.   Musculoskeletal: Positive for arthralgias. Negative for back pain.   Skin: Negative for rash.   Neurological: Negative for numbness and headaches.   Psychiatric/Behavioral: Positive for dysphoric mood (getting better--still grieving 's death). Negative for confusion and sleep disturbance.       Objective:       Vitals:    05/11/22 1245   BP: (!) 112/50   Pulse: 81   SpO2: 96%   Weight: 65.4 kg (144 lb 2.9 oz)   Height: 4' 11" (1.499 m)     Physical Exam  Vitals and nursing note reviewed.   Constitutional:       Appearance: She is well-developed.   HENT:      Head: Normocephalic.   Eyes:      Conjunctiva/sclera: Conjunctivae normal.      Pupils: Pupils are equal, round, and reactive to light.   Neck:      Thyroid: No thyromegaly.   Cardiovascular:      Rate and Rhythm: Normal rate and regular rhythm.      Pulses:           Dorsalis pedis pulses are 2+ on the right side and 2+ on the left side.        Posterior tibial pulses are 2+ on the " right side and 2+ on the left side.      Heart sounds: Normal heart sounds.   Pulmonary:      Effort: Pulmonary effort is normal.      Breath sounds: Normal breath sounds.   Abdominal:      General: Bowel sounds are normal.      Palpations: Abdomen is soft.      Tenderness: There is no abdominal tenderness.   Musculoskeletal:         General: No tenderness or deformity. Normal range of motion.      Cervical back: Normal range of motion and neck supple.      Right foot: Normal range of motion. No deformity.      Left foot: Normal range of motion. No deformity.   Feet:      Right foot:      Protective Sensation: 7 sites tested. 7 sites sensed.      Skin integrity: No ulcer.      Left foot:      Protective Sensation: 7 sites tested. 7 sites sensed.      Skin integrity: No ulcer.   Lymphadenopathy:      Cervical: No cervical adenopathy.   Skin:     General: Skin is warm and dry.   Neurological:      General: No focal deficit present.      Mental Status: She is alert and oriented to person, place, and time.      Cranial Nerves: No cranial nerve deficit.      Motor: No abnormal muscle tone.      Coordination: Coordination normal.      Deep Tendon Reflexes: Reflexes normal.   Psychiatric:         Mood and Affect: Mood normal.         Behavior: Behavior normal.         Thought Content: Thought content normal.         Assessment:       1. Pre-op evaluation    2. Senile cataract of right eye, unspecified age-related cataract type        Plan:       Kelly was seen today for pre-op exam.    Diagnoses and all orders for this visit:    Pre-op evaluation    Senile cataract of right eye, unspecified age-related cataract type      During this visit, I reviewed the pt's history, medications, allergies, and problem list.      The pt is cleared for upcoming cataract surgery.

## 2022-05-12 ENCOUNTER — PATIENT MESSAGE (OUTPATIENT)
Dept: FAMILY MEDICINE | Facility: CLINIC | Age: 87
End: 2022-05-12
Payer: MEDICARE

## 2022-06-03 ENCOUNTER — OFFICE VISIT (OUTPATIENT)
Dept: FAMILY MEDICINE | Facility: CLINIC | Age: 87
End: 2022-06-03
Payer: MEDICARE

## 2022-06-03 VITALS
DIASTOLIC BLOOD PRESSURE: 52 MMHG | HEART RATE: 72 BPM | BODY MASS INDEX: 29.02 KG/M2 | HEIGHT: 59 IN | SYSTOLIC BLOOD PRESSURE: 128 MMHG | WEIGHT: 143.94 LBS | OXYGEN SATURATION: 99 %

## 2022-06-03 DIAGNOSIS — E11.21 TYPE 2 DIABETES MELLITUS WITH DIABETIC NEPHROPATHY, WITHOUT LONG-TERM CURRENT USE OF INSULIN: ICD-10-CM

## 2022-06-03 DIAGNOSIS — B35.1 ONYCHOMYCOSIS: ICD-10-CM

## 2022-06-03 DIAGNOSIS — L60.0 INGROWN NAIL: Primary | ICD-10-CM

## 2022-06-03 PROCEDURE — 1125F AMNT PAIN NOTED PAIN PRSNT: CPT | Mod: CPTII,S$GLB,, | Performed by: PHYSICIAN ASSISTANT

## 2022-06-03 PROCEDURE — 3078F PR MOST RECENT DIASTOLIC BLOOD PRESSURE < 80 MM HG: ICD-10-PCS | Mod: CPTII,S$GLB,, | Performed by: PHYSICIAN ASSISTANT

## 2022-06-03 PROCEDURE — 1159F MED LIST DOCD IN RCRD: CPT | Mod: CPTII,S$GLB,, | Performed by: PHYSICIAN ASSISTANT

## 2022-06-03 PROCEDURE — 3288F FALL RISK ASSESSMENT DOCD: CPT | Mod: CPTII,S$GLB,, | Performed by: PHYSICIAN ASSISTANT

## 2022-06-03 PROCEDURE — 1101F PR PT FALLS ASSESS DOC 0-1 FALLS W/OUT INJ PAST YR: ICD-10-PCS | Mod: CPTII,S$GLB,, | Performed by: PHYSICIAN ASSISTANT

## 2022-06-03 PROCEDURE — 3288F PR FALLS RISK ASSESSMENT DOCUMENTED: ICD-10-PCS | Mod: CPTII,S$GLB,, | Performed by: PHYSICIAN ASSISTANT

## 2022-06-03 PROCEDURE — 99214 PR OFFICE/OUTPT VISIT, EST, LEVL IV, 30-39 MIN: ICD-10-PCS | Mod: S$GLB,,, | Performed by: PHYSICIAN ASSISTANT

## 2022-06-03 PROCEDURE — 1101F PT FALLS ASSESS-DOCD LE1/YR: CPT | Mod: CPTII,S$GLB,, | Performed by: PHYSICIAN ASSISTANT

## 2022-06-03 PROCEDURE — 3051F HG A1C>EQUAL 7.0%<8.0%: CPT | Mod: CPTII,S$GLB,, | Performed by: PHYSICIAN ASSISTANT

## 2022-06-03 PROCEDURE — 99999 PR PBB SHADOW E&M-EST. PATIENT-LVL V: CPT | Mod: PBBFAC,,, | Performed by: PHYSICIAN ASSISTANT

## 2022-06-03 PROCEDURE — 1159F PR MEDICATION LIST DOCUMENTED IN MEDICAL RECORD: ICD-10-PCS | Mod: CPTII,S$GLB,, | Performed by: PHYSICIAN ASSISTANT

## 2022-06-03 PROCEDURE — 1125F PR PAIN SEVERITY QUANTIFIED, PAIN PRESENT: ICD-10-PCS | Mod: CPTII,S$GLB,, | Performed by: PHYSICIAN ASSISTANT

## 2022-06-03 PROCEDURE — 3078F DIAST BP <80 MM HG: CPT | Mod: CPTII,S$GLB,, | Performed by: PHYSICIAN ASSISTANT

## 2022-06-03 PROCEDURE — 3051F PR MOST RECENT HEMOGLOBIN A1C LEVEL 7.0 - < 8.0%: ICD-10-PCS | Mod: CPTII,S$GLB,, | Performed by: PHYSICIAN ASSISTANT

## 2022-06-03 PROCEDURE — 99999 PR PBB SHADOW E&M-EST. PATIENT-LVL V: ICD-10-PCS | Mod: PBBFAC,,, | Performed by: PHYSICIAN ASSISTANT

## 2022-06-03 PROCEDURE — 3074F SYST BP LT 130 MM HG: CPT | Mod: CPTII,S$GLB,, | Performed by: PHYSICIAN ASSISTANT

## 2022-06-03 PROCEDURE — 3074F PR MOST RECENT SYSTOLIC BLOOD PRESSURE < 130 MM HG: ICD-10-PCS | Mod: CPTII,S$GLB,, | Performed by: PHYSICIAN ASSISTANT

## 2022-06-03 PROCEDURE — 99214 OFFICE O/P EST MOD 30 MIN: CPT | Mod: S$GLB,,, | Performed by: PHYSICIAN ASSISTANT

## 2022-06-03 RX ORDER — CEPHALEXIN 500 MG/1
500 CAPSULE ORAL 4 TIMES DAILY
Qty: 28 CAPSULE | Refills: 0 | Status: SHIPPED | OUTPATIENT
Start: 2022-06-03 | End: 2022-06-10

## 2022-06-03 NOTE — PROGRESS NOTES
Subjective:       Patient ID: Dolores B LeJeune is a 86 y.o. female       Chief Complaint: Foot Swelling (Right foot swelling, it started Wednesday) and Nail Problem (Big toe on left foot )    HPI  Patient's  PCP: ENDER Soto MD.  The patient's last visit with me was on Visit date not found  Patient's past medical history includes:  CAD with CABG, ischemic cardiomyopathy, HTN, HLD, type II DM    The patient presents today CO right foot swelling that began 2 days ago.      Review of Systems   Constitutional: Negative for activity change, chills and fever.   HENT: Negative for congestion and sore throat.    Eyes: Negative for visual disturbance.   Respiratory: Negative for cough and shortness of breath.    Cardiovascular: Positive for leg swelling. Negative for chest pain and palpitations.   Gastrointestinal: Negative for abdominal pain, diarrhea, nausea and vomiting.   Endocrine: Negative for polydipsia and polyuria.   Musculoskeletal: Positive for arthralgias. Negative for myalgias.   Skin: Negative for rash.   Neurological: Negative for headaches.   Psychiatric/Behavioral: The patient is not nervous/anxious.         Objective:   Physical Exam  Constitutional:       General: She is not in acute distress.     Appearance: She is well-developed. She is not diaphoretic.   HENT:      Head: Normocephalic and atraumatic.   Eyes:      Pupils: Pupils are equal, round, and reactive to light.   Cardiovascular:      Rate and Rhythm: Normal rate and regular rhythm.      Heart sounds: Normal heart sounds. No murmur heard.    No friction rub. No gallop.   Pulmonary:      Effort: Pulmonary effort is normal. No respiratory distress.      Breath sounds: Normal breath sounds. No wheezing or rales.   Chest:      Chest wall: No tenderness.   Abdominal:      General: Bowel sounds are normal. There is no distension.      Palpations: Abdomen is soft. There is no mass.      Tenderness: There is no abdominal tenderness. There is no  guarding.   Musculoskeletal:         General: Swelling (slight edema and erythema present in the right foot) present. Normal range of motion.      Cervical back: Normal range of motion and neck supple.   Skin:     General: Skin is warm and dry.      Findings: No rash.   Neurological:      Mental Status: She is alert and oriented to person, place, and time.   Psychiatric:         Behavior: Behavior normal.         Thought Content: Thought content normal.         Judgment: Judgment normal.          Assessment:       1. Ingrown nail  Ambulatory referral/consult to Podiatry    cephALEXin (KEFLEX) 500 MG capsule   2. Type 2 diabetes mellitus with diabetic nephropathy, without long-term current use of insulin  Ambulatory referral/consult to Podiatry   3. Onychomycosis  Ambulatory referral/consult to Podiatry        Plan:       Ingrown nail  -     Ambulatory referral/consult to Podiatry; Future; Expected date: 06/10/2022  -     cephALEXin (KEFLEX) 500 MG capsule; Take 1 capsule (500 mg total) by mouth 4 (four) times daily. for 7 days  Dispense: 28 capsule; Refill: 0    Type 2 diabetes mellitus with diabetic nephropathy, without long-term current use of insulin  -     Ambulatory referral/consult to Podiatry; Future; Expected date: 06/10/2022    Onychomycosis  -     Ambulatory referral/consult to Podiatry; Future; Expected date: 06/10/2022         No follow-ups on file.       Mariama Montes De Oca PA-C   06/15/2022   1:59 PM

## 2022-06-06 DIAGNOSIS — E78.5 TYPE 2 DIABETES MELLITUS WITH HYPERLIPIDEMIA: ICD-10-CM

## 2022-06-06 DIAGNOSIS — E11.69 TYPE 2 DIABETES MELLITUS WITH HYPERLIPIDEMIA: ICD-10-CM

## 2022-06-06 DIAGNOSIS — E11.59 HYPERTENSION ASSOCIATED WITH DIABETES: ICD-10-CM

## 2022-06-06 DIAGNOSIS — I15.2 HYPERTENSION ASSOCIATED WITH DIABETES: ICD-10-CM

## 2022-06-06 RX ORDER — METFORMIN HYDROCHLORIDE 500 MG/1
TABLET ORAL
Qty: 90 TABLET | Refills: 0 | Status: SHIPPED | OUTPATIENT
Start: 2022-06-06 | End: 2022-12-07

## 2022-06-06 NOTE — TELEPHONE ENCOUNTER
Refill Routing Note   Medication(s) are not appropriate for processing by [facility] for the following reason(s):      - Required laboratory values are abnormal    [facility] action(s):  Defer Medication-related problems identified: Requires labs        Medication reconciliation completed: No     Appointments  past 12m or future 3m with PCP    Date Provider   Last Visit   5/11/2022 BEBO Soto MD   Next Visit   Visit date not found BEBO Soto MD   ED visits in past 90 days: 0        Note composed:6:07 PM 06/06/2022

## 2022-06-06 NOTE — TELEPHONE ENCOUNTER
Care Due:                  Date            Visit Type   Department     Provider  --------------------------------------------------------------------------------                                EP -                              PRIMARY      [facility] FAMILY  Last Visit: 05-      CARE ([facility])   MEDICINE       BEBO SEVILLA  Next Visit: None Scheduled  None         None Found                                                            Last  Test          Frequency    Reason                     Performed    Due Date  --------------------------------------------------------------------------------    HBA1C.......  6 months...  metFORMIN................  02- 08-    [facility] Embedded Care Gaps. Reference number: [id number]. 6/06/2022   7:49:25 AM CDT

## 2022-06-17 ENCOUNTER — TELEPHONE (OUTPATIENT)
Dept: PODIATRY | Facility: CLINIC | Age: 87
End: 2022-06-17
Payer: MEDICARE

## 2022-06-17 ENCOUNTER — OFFICE VISIT (OUTPATIENT)
Dept: PODIATRY | Facility: CLINIC | Age: 87
End: 2022-06-17
Payer: MEDICARE

## 2022-06-17 VITALS — WEIGHT: 143.94 LBS | BODY MASS INDEX: 29.02 KG/M2 | HEIGHT: 59 IN

## 2022-06-17 DIAGNOSIS — E11.21 TYPE 2 DIABETES MELLITUS WITH DIABETIC NEPHROPATHY, WITHOUT LONG-TERM CURRENT USE OF INSULIN: ICD-10-CM

## 2022-06-17 DIAGNOSIS — I73.9 PAD (PERIPHERAL ARTERY DISEASE): Primary | ICD-10-CM

## 2022-06-17 DIAGNOSIS — B35.1 ONYCHOMYCOSIS: ICD-10-CM

## 2022-06-17 DIAGNOSIS — L60.0 INGROWN NAIL: ICD-10-CM

## 2022-06-17 PROCEDURE — 99203 PR OFFICE/OUTPT VISIT, NEW, LEVL III, 30-44 MIN: ICD-10-PCS | Mod: S$GLB,,, | Performed by: STUDENT IN AN ORGANIZED HEALTH CARE EDUCATION/TRAINING PROGRAM

## 2022-06-17 PROCEDURE — 1159F MED LIST DOCD IN RCRD: CPT | Mod: CPTII,S$GLB,, | Performed by: STUDENT IN AN ORGANIZED HEALTH CARE EDUCATION/TRAINING PROGRAM

## 2022-06-17 PROCEDURE — 1125F AMNT PAIN NOTED PAIN PRSNT: CPT | Mod: CPTII,S$GLB,, | Performed by: STUDENT IN AN ORGANIZED HEALTH CARE EDUCATION/TRAINING PROGRAM

## 2022-06-17 PROCEDURE — 3288F PR FALLS RISK ASSESSMENT DOCUMENTED: ICD-10-PCS | Mod: CPTII,S$GLB,, | Performed by: STUDENT IN AN ORGANIZED HEALTH CARE EDUCATION/TRAINING PROGRAM

## 2022-06-17 PROCEDURE — 99999 PR PBB SHADOW E&M-EST. PATIENT-LVL III: CPT | Mod: PBBFAC,,, | Performed by: STUDENT IN AN ORGANIZED HEALTH CARE EDUCATION/TRAINING PROGRAM

## 2022-06-17 PROCEDURE — 1101F PR PT FALLS ASSESS DOC 0-1 FALLS W/OUT INJ PAST YR: ICD-10-PCS | Mod: CPTII,S$GLB,, | Performed by: STUDENT IN AN ORGANIZED HEALTH CARE EDUCATION/TRAINING PROGRAM

## 2022-06-17 PROCEDURE — 1101F PT FALLS ASSESS-DOCD LE1/YR: CPT | Mod: CPTII,S$GLB,, | Performed by: STUDENT IN AN ORGANIZED HEALTH CARE EDUCATION/TRAINING PROGRAM

## 2022-06-17 PROCEDURE — 1160F PR REVIEW ALL MEDS BY PRESCRIBER/CLIN PHARMACIST DOCUMENTED: ICD-10-PCS | Mod: CPTII,S$GLB,, | Performed by: STUDENT IN AN ORGANIZED HEALTH CARE EDUCATION/TRAINING PROGRAM

## 2022-06-17 PROCEDURE — 1159F PR MEDICATION LIST DOCUMENTED IN MEDICAL RECORD: ICD-10-PCS | Mod: CPTII,S$GLB,, | Performed by: STUDENT IN AN ORGANIZED HEALTH CARE EDUCATION/TRAINING PROGRAM

## 2022-06-17 PROCEDURE — 3051F PR MOST RECENT HEMOGLOBIN A1C LEVEL 7.0 - < 8.0%: ICD-10-PCS | Mod: CPTII,S$GLB,, | Performed by: STUDENT IN AN ORGANIZED HEALTH CARE EDUCATION/TRAINING PROGRAM

## 2022-06-17 PROCEDURE — 99203 OFFICE O/P NEW LOW 30 MIN: CPT | Mod: S$GLB,,, | Performed by: STUDENT IN AN ORGANIZED HEALTH CARE EDUCATION/TRAINING PROGRAM

## 2022-06-17 PROCEDURE — 1160F RVW MEDS BY RX/DR IN RCRD: CPT | Mod: CPTII,S$GLB,, | Performed by: STUDENT IN AN ORGANIZED HEALTH CARE EDUCATION/TRAINING PROGRAM

## 2022-06-17 PROCEDURE — 3288F FALL RISK ASSESSMENT DOCD: CPT | Mod: CPTII,S$GLB,, | Performed by: STUDENT IN AN ORGANIZED HEALTH CARE EDUCATION/TRAINING PROGRAM

## 2022-06-17 PROCEDURE — 1125F PR PAIN SEVERITY QUANTIFIED, PAIN PRESENT: ICD-10-PCS | Mod: CPTII,S$GLB,, | Performed by: STUDENT IN AN ORGANIZED HEALTH CARE EDUCATION/TRAINING PROGRAM

## 2022-06-17 PROCEDURE — 99999 PR PBB SHADOW E&M-EST. PATIENT-LVL III: ICD-10-PCS | Mod: PBBFAC,,, | Performed by: STUDENT IN AN ORGANIZED HEALTH CARE EDUCATION/TRAINING PROGRAM

## 2022-06-17 PROCEDURE — 3051F HG A1C>EQUAL 7.0%<8.0%: CPT | Mod: CPTII,S$GLB,, | Performed by: STUDENT IN AN ORGANIZED HEALTH CARE EDUCATION/TRAINING PROGRAM

## 2022-06-17 NOTE — PROGRESS NOTES
CHIEF CONCERN: Nail Problem (Left first toenail )         HPI:    Dolores B LeJeune is a 86 y.o. female with concerns of painful toenail on the right hallux.    The pain started months ago.  Pain aggravated by direct pressure and close toe shoes.   Patient denies acute trauma to the toe.    Home treatment:  nothing      Patient Active Problem List   Diagnosis    Hypertension    Neuropathy    Colon polyps    Hypercholesterolemia    HTN (hypertension)    DM (diabetes mellitus), type 2 with renal complications    Hypertension associated with diabetes    Type 2 diabetes mellitus with hyperlipidemia    Type 2 diabetes mellitus with complication, without long-term current use of insulin    Cataract, right eye    Controlled type 2 diabetes mellitus, without long-term current use of insulin    Renal insufficiency    Orthostatic dizziness    Acute bronchitis    S/P CABG (coronary artery bypass graft)    Coronary artery disease    Bilateral impacted cerumen    Ischemic cardiomyopathy    Long term (current) use of antithrombotics/antiplatelets    Venous insufficiency of left leg    Hyperkalemia    Diastolic dysfunction    Overweight (BMI 25.0-29.9)    Stage 3b chronic kidney disease           Current Outpatient Medications on File Prior to Visit   Medication Sig Dispense Refill    ACCU-CHEK SOFTCLIX LANCETS Misc USE ONE AS DIRECTED TWICE DAILY 200 each 10    aspirin (ECOTRIN) 81 MG EC tablet Take 81 mg by mouth once daily.      atorvastatin (LIPITOR) 40 MG tablet TAKE ONE TABLET BY MOUTH DAILY 90 tablet 0    biotin 5,000 mcg TbDL Take by mouth.      CALCIUM CARBONATE/VITAMIN D3 (CALCIUM 500 WITH D ORAL) Take by mouth.      clopidogreL (PLAVIX) 75 mg tablet TAKE ONE TABLET BY MOUTH DAILY 90 tablet 1    DEXTRAN 70/HYPROMELLOSE (ARTIFICIAL TEARS OPHT) Apply to eye.      gabapentin (NEURONTIN) 300 MG capsule TAKE ONE CAPSULE BY MOUTH DAILY 90 capsule 3    losartan (COZAAR) 25 MG tablet TAKE  ONE  "TABLET BY MOUTH DAILY 90 tablet 1    meclizine (ANTIVERT) 25 mg tablet Take 1 tablet by mouth Three times daily as needed.      metFORMIN (GLUCOPHAGE) 500 MG tablet TAKE 1/2 TABLET BY MOUTH TWICE DAILY WITH FOOD 90 tablet 0    metoprolol succinate (TOPROL-XL) 25 MG 24 hr tablet TAKE ONE-HALF TABLET BY MOUTH DAILY 45 tablet 1    MYRBETRIQ 50 mg Tb24 TAKE ONE TABLET BY MOUTH DAILY 90 tablet 1    ONETOUCH DELICA LANCETS 33 gauge Misc       ONETOUCH VERIO TEST STRIPS Strp USE ONE STRIP FOR TESTING TWICE DAILY 200 each 3    pantoprazole (PROTONIX) 40 MG tablet Take 1 tablet (40 mg total) by mouth once daily. (Patient taking differently: Take 40 mg by mouth as needed.) 90 tablet 1    RUTIN/HESP/BIOFLAV/C/[id number] (BIOFLEX ORAL) Take 1 tablet by mouth once daily at 6am.       No current facility-administered medications on file prior to visit.            Review of patient's allergies indicates:   Allergen Reactions    Albuterol Other (See Comments)     Palpitations/tremor      Sulfa (sulfonamide antibiotics) Rash           ROS:  General ROS: negative for chills, fatigue or fever  Cardiovascular ROS: no chest pain or dyspnea on exertion  Musculoskeletal ROS: negative for - joint pain or joint stiffness.  Negative for loss of strength  Neuro ROS: Negative for syncope, numbness, or muscle weakness  Skin ROS: Negative for rash, itching or hair changes.  +Toenail changes        EXAM:   Vitals:    06/17/22 0906   Weight: 65.3 kg (143 lb 15.4 oz)   Height: 4' 11" (1.499 m)       Right LOWER EXTREMITY EXAM:    Vascular:    Dorsalis pedis and posterior tibial pulses are nonpalpable. capillary refill time is delayed >3s  Toes are cool to touch.    There is absence of digital hair.    There is  no localized edema to the affected toe.     Neurological:    Light touch, proprioception, and sharp/dull sensation are all intact.   Mulders click:  Absent  Tinels:  Absent.   No LOPS    Dermatological:    The toenail is incurvated, " Bilateral border of the right hallux.      no erythema noted to the affected area.     Absent paronychia.     Absent abscess      Musculoskeletal:    Muscle strength is 5/5 in all groups .    No swelling/crepitus at the interphalangeal joints.        ASSESSMENT/PLAN     Problem List Items Addressed This Visit        Endocrine    DM (diabetes mellitus), type 2 with renal complications      Other Visit Diagnoses     PAD (peripheral artery disease)    -  Primary    Relevant Orders    Ankle Brachial Indices (SEE)    Ingrown nail        Onychomycosis                I counseled the patient on the patient's  conditions, their implications and medical management.     Discussed risks and complications of proceeding with a potential PNA. Patient would like for it to be done vs conservative measures. Due to her PAD and h/o DMII, I recommend obtaining ABIs prior to proceeding with a procedure.     F/u after ABIs    Jann Dillard DPM

## 2022-06-17 NOTE — TELEPHONE ENCOUNTER
Left message informing Pt of canceled appointment. Pt was advised to return call to clinic for rescheduling.

## 2022-07-21 ENCOUNTER — CLINICAL SUPPORT (OUTPATIENT)
Dept: CARDIOLOGY | Facility: HOSPITAL | Age: 87
End: 2022-07-21
Attending: STUDENT IN AN ORGANIZED HEALTH CARE EDUCATION/TRAINING PROGRAM
Payer: MEDICARE

## 2022-07-21 DIAGNOSIS — I73.9 PAD (PERIPHERAL ARTERY DISEASE): ICD-10-CM

## 2022-07-21 LAB
LEFT ABI: 0.94
LEFT ARM BP: 179 MMHG
LEFT DORSALIS PEDIS: 165 MMHG
LEFT POSTERIOR TIBIAL: 168 MMHG
LEFT TBI: 0.91
LEFT TOE PRESSURE: 162 MMHG
RIGHT ABI: 1.01
RIGHT ARM BP: 174 MMHG
RIGHT DORSALIS PEDIS: 175 MMHG
RIGHT POSTERIOR TIBIAL: 180 MMHG
RIGHT TBI: 0.8
RIGHT TOE PRESSURE: 143 MMHG

## 2022-07-21 PROCEDURE — 93922 UPR/L XTREMITY ART 2 LEVELS: CPT | Mod: PO

## 2022-07-21 PROCEDURE — 93922 UPR/L XTREMITY ART 2 LEVELS: CPT | Mod: 26,,, | Performed by: INTERNAL MEDICINE

## 2022-07-21 PROCEDURE — 93922 ANKLE BRACHIAL INDICES (ABI): ICD-10-PCS | Mod: 26,,, | Performed by: INTERNAL MEDICINE

## 2022-07-28 ENCOUNTER — OFFICE VISIT (OUTPATIENT)
Dept: PODIATRY | Facility: CLINIC | Age: 87
End: 2022-07-28
Payer: MEDICARE

## 2022-07-28 DIAGNOSIS — B35.1 ONYCHOMYCOSIS: Primary | ICD-10-CM

## 2022-07-28 DIAGNOSIS — M79.89 LEG SWELLING: ICD-10-CM

## 2022-07-28 DIAGNOSIS — I73.9 PAD (PERIPHERAL ARTERY DISEASE): ICD-10-CM

## 2022-07-28 PROCEDURE — 3288F FALL RISK ASSESSMENT DOCD: CPT | Mod: CPTII,S$GLB,, | Performed by: STUDENT IN AN ORGANIZED HEALTH CARE EDUCATION/TRAINING PROGRAM

## 2022-07-28 PROCEDURE — 99999 PR PBB SHADOW E&M-EST. PATIENT-LVL III: ICD-10-PCS | Mod: PBBFAC,,, | Performed by: STUDENT IN AN ORGANIZED HEALTH CARE EDUCATION/TRAINING PROGRAM

## 2022-07-28 PROCEDURE — 99999 PR PBB SHADOW E&M-EST. PATIENT-LVL III: CPT | Mod: PBBFAC,,, | Performed by: STUDENT IN AN ORGANIZED HEALTH CARE EDUCATION/TRAINING PROGRAM

## 2022-07-28 PROCEDURE — 1101F PT FALLS ASSESS-DOCD LE1/YR: CPT | Mod: CPTII,S$GLB,, | Performed by: STUDENT IN AN ORGANIZED HEALTH CARE EDUCATION/TRAINING PROGRAM

## 2022-07-28 PROCEDURE — 11721 DEBRIDE NAIL 6 OR MORE: CPT | Mod: S$GLB,,, | Performed by: STUDENT IN AN ORGANIZED HEALTH CARE EDUCATION/TRAINING PROGRAM

## 2022-07-28 PROCEDURE — 1160F RVW MEDS BY RX/DR IN RCRD: CPT | Mod: CPTII,S$GLB,, | Performed by: STUDENT IN AN ORGANIZED HEALTH CARE EDUCATION/TRAINING PROGRAM

## 2022-07-28 PROCEDURE — 1160F PR REVIEW ALL MEDS BY PRESCRIBER/CLIN PHARMACIST DOCUMENTED: ICD-10-PCS | Mod: CPTII,S$GLB,, | Performed by: STUDENT IN AN ORGANIZED HEALTH CARE EDUCATION/TRAINING PROGRAM

## 2022-07-28 PROCEDURE — 11721 ROUTINE FOOT CARE: ICD-10-PCS | Mod: S$GLB,,, | Performed by: STUDENT IN AN ORGANIZED HEALTH CARE EDUCATION/TRAINING PROGRAM

## 2022-07-28 PROCEDURE — 1126F AMNT PAIN NOTED NONE PRSNT: CPT | Mod: CPTII,S$GLB,, | Performed by: STUDENT IN AN ORGANIZED HEALTH CARE EDUCATION/TRAINING PROGRAM

## 2022-07-28 PROCEDURE — 1159F PR MEDICATION LIST DOCUMENTED IN MEDICAL RECORD: ICD-10-PCS | Mod: CPTII,S$GLB,, | Performed by: STUDENT IN AN ORGANIZED HEALTH CARE EDUCATION/TRAINING PROGRAM

## 2022-07-28 PROCEDURE — 99213 OFFICE O/P EST LOW 20 MIN: CPT | Mod: 25,S$GLB,, | Performed by: STUDENT IN AN ORGANIZED HEALTH CARE EDUCATION/TRAINING PROGRAM

## 2022-07-28 PROCEDURE — 1159F MED LIST DOCD IN RCRD: CPT | Mod: CPTII,S$GLB,, | Performed by: STUDENT IN AN ORGANIZED HEALTH CARE EDUCATION/TRAINING PROGRAM

## 2022-07-28 PROCEDURE — 3288F PR FALLS RISK ASSESSMENT DOCUMENTED: ICD-10-PCS | Mod: CPTII,S$GLB,, | Performed by: STUDENT IN AN ORGANIZED HEALTH CARE EDUCATION/TRAINING PROGRAM

## 2022-07-28 PROCEDURE — 1101F PR PT FALLS ASSESS DOC 0-1 FALLS W/OUT INJ PAST YR: ICD-10-PCS | Mod: CPTII,S$GLB,, | Performed by: STUDENT IN AN ORGANIZED HEALTH CARE EDUCATION/TRAINING PROGRAM

## 2022-07-28 PROCEDURE — 1126F PR PAIN SEVERITY QUANTIFIED, NO PAIN PRESENT: ICD-10-PCS | Mod: CPTII,S$GLB,, | Performed by: STUDENT IN AN ORGANIZED HEALTH CARE EDUCATION/TRAINING PROGRAM

## 2022-07-28 PROCEDURE — 99213 PR OFFICE/OUTPT VISIT, EST, LEVL III, 20-29 MIN: ICD-10-PCS | Mod: 25,S$GLB,, | Performed by: STUDENT IN AN ORGANIZED HEALTH CARE EDUCATION/TRAINING PROGRAM

## 2022-07-28 RX ORDER — CICLOPIROX 80 MG/ML
SOLUTION TOPICAL NIGHTLY
Qty: 6.6 ML | Refills: 11 | Status: SHIPPED | OUTPATIENT
Start: 2022-07-28 | End: 2023-09-13

## 2022-07-28 NOTE — PROGRESS NOTES
CHIEF CONCERN: Diabetes Mellitus, Ingrown Toenail, and Nail Care         HPI:    Dolores B LeJeune is a 87 y.o. female with concerns of painful toenail on the left hallux.    The pain started months ago.  Pain aggravated by direct pressure and close toe shoes.   Patient denies acute trauma to the toe.    Home treatment:  nothing    7/28/22: f/u left great toe dystrophy and pain.     Patient Active Problem List   Diagnosis    Hypertension    Neuropathy    Colon polyps    Hypercholesterolemia    HTN (hypertension)    DM (diabetes mellitus), type 2 with renal complications    Hypertension associated with diabetes    Type 2 diabetes mellitus with hyperlipidemia    Type 2 diabetes mellitus with complication, without long-term current use of insulin    Cataract, right eye    Controlled type 2 diabetes mellitus, without long-term current use of insulin    Renal insufficiency    Orthostatic dizziness    Acute bronchitis    S/P CABG (coronary artery bypass graft)    Coronary artery disease    Bilateral impacted cerumen    Ischemic cardiomyopathy    Long term (current) use of antithrombotics/antiplatelets    Venous insufficiency of left leg    Hyperkalemia    Diastolic dysfunction    Overweight (BMI 25.0-29.9)    Stage 3b chronic kidney disease           Current Outpatient Medications on File Prior to Visit   Medication Sig Dispense Refill    ACCU-CHEK SOFTCLIX LANCETS Misc USE ONE AS DIRECTED TWICE DAILY 200 each 10    aspirin (ECOTRIN) 81 MG EC tablet Take 81 mg by mouth once daily.      atorvastatin (LIPITOR) 40 MG tablet TAKE ONE TABLET BY MOUTH DAILY 90 tablet 0    biotin 5,000 mcg TbDL Take by mouth.      CALCIUM CARBONATE/VITAMIN D3 (CALCIUM 500 WITH D ORAL) Take by mouth.      clopidogreL (PLAVIX) 75 mg tablet TAKE ONE TABLET BY MOUTH DAILY 90 tablet 1    DEXTRAN 70/HYPROMELLOSE (ARTIFICIAL TEARS OPHT) Apply to eye.      gabapentin (NEURONTIN) 300 MG capsule TAKE ONE CAPSULE BY MOUTH  DAILY 90 capsule 3    losartan (COZAAR) 25 MG tablet TAKE  ONE TABLET BY MOUTH DAILY 90 tablet 1    meclizine (ANTIVERT) 25 mg tablet Take 1 tablet by mouth Three times daily as needed.      metFORMIN (GLUCOPHAGE) 500 MG tablet TAKE 1/2 TABLET BY MOUTH TWICE DAILY WITH FOOD 90 tablet 0    metoprolol succinate (TOPROL-XL) 25 MG 24 hr tablet TAKE ONE-HALF TABLET BY MOUTH DAILY 45 tablet 1    MYRBETRIQ 50 mg Tb24 TAKE ONE TABLET BY MOUTH DAILY 90 tablet 1    ONETOUCH DELICA LANCETS 33 gauge Misc       ONETOUCH VERIO TEST STRIPS Strp USE ONE STRIP FOR TESTING TWICE DAILY 200 each 3    RUTIN/HESP/BIOFLAV/C/[id number] (BIOFLEX ORAL) Take 1 tablet by mouth once daily at 6am.      pantoprazole (PROTONIX) 40 MG tablet Take 1 tablet (40 mg total) by mouth once daily. (Patient taking differently: Take 40 mg by mouth as needed.) 90 tablet 1     No current facility-administered medications on file prior to visit.            Review of patient's allergies indicates:   Allergen Reactions    Albuterol Other (See Comments)     Palpitations/tremor      Sulfa (sulfonamide antibiotics) Rash           ROS:  General ROS: negative for chills, fatigue or fever  Cardiovascular ROS: no chest pain or dyspnea on exertion  Musculoskeletal ROS: negative for - joint pain or joint stiffness.  Negative for loss of strength  Neuro ROS: Negative for syncope, numbness, or muscle weakness  Skin ROS: Negative for rash, itching or hair changes.  +Toenail changes        EXAM:   There were no vitals filed for this visit.    Right LOWER EXTREMITY EXAM:    Vascular:    Dorsalis pedis and posterior tibial pulses are nonpalpable. capillary refill time is delayed >3s  Toes are cool to touch.    There is absence of digital hair.    There is  no localized edema to the affected toe.   2+ pitting edema b/l with R>L.    Neurological:    Light touch, proprioception, and sharp/dull sensation are all intact.   Mulders click:  Absent  Tinels:  Absent.   No  LOPS    Dermatological:    The toenail is incurvated, Bilateral border of the left hallux. There is also thickening      no erythema noted to the affected area.     Absent paronychia.     Absent abscess      Musculoskeletal:    Muscle strength is 5/5 in all groups .    No swelling/crepitus at the interphalangeal joints.        ASSESSMENT/PLAN     Problem List Items Addressed This Visit    None     Visit Diagnoses     Onychomycosis    -  Primary    PAD (peripheral artery disease)        Leg swelling                I counseled the patient on the patient's  conditions, their implications and medical management.     Routine care provided today.    Penlac rx for thickened left great toe nail.     F/u 3-4 months.    Routine Foot Care    Date/Time: 7/28/2022 10:20 AM  Performed by: Jann Dillard DPM  Authorized by: Jann Dillard DPM     Consent Done?:  Yes (Verbal)  Hyperkeratotic Skin Lesions?: No      Nail Care Type:  Debride  Location(s): All  (Left 1st Toe, Left 3rd Toe, Left 2nd Toe, Left 4th Toe, Left 5th Toe, Right 1st Toe, Right 2nd Toe, Right 3rd Toe, Right 4th Toe and Right 5th Toe)  Patient tolerance:  Patient tolerated the procedure well with no immediate complications          Jann Dillard DPM

## 2022-08-23 ENCOUNTER — OFFICE VISIT (OUTPATIENT)
Dept: PODIATRY | Facility: CLINIC | Age: 87
End: 2022-08-23
Payer: MEDICARE

## 2022-08-23 VITALS — BODY MASS INDEX: 29.02 KG/M2 | HEIGHT: 59 IN | WEIGHT: 143.94 LBS

## 2022-08-23 DIAGNOSIS — L60.0 INGROWN TOENAIL OF LEFT FOOT: ICD-10-CM

## 2022-08-23 DIAGNOSIS — B35.1 ONYCHOMYCOSIS DUE TO DERMATOPHYTE: ICD-10-CM

## 2022-08-23 DIAGNOSIS — M79.675 TOE PAIN, LEFT: Primary | ICD-10-CM

## 2022-08-23 DIAGNOSIS — I73.9 PVD (PERIPHERAL VASCULAR DISEASE): ICD-10-CM

## 2022-08-23 PROCEDURE — 99999 PR PBB SHADOW E&M-EST. PATIENT-LVL III: ICD-10-PCS | Mod: PBBFAC,,, | Performed by: PODIATRIST

## 2022-08-23 PROCEDURE — 99999 PR PBB SHADOW E&M-EST. PATIENT-LVL III: CPT | Mod: PBBFAC,,, | Performed by: PODIATRIST

## 2022-08-23 PROCEDURE — 1125F AMNT PAIN NOTED PAIN PRSNT: CPT | Mod: CPTII,S$GLB,, | Performed by: PODIATRIST

## 2022-08-23 PROCEDURE — 1125F PR PAIN SEVERITY QUANTIFIED, PAIN PRESENT: ICD-10-PCS | Mod: CPTII,S$GLB,, | Performed by: PODIATRIST

## 2022-08-23 PROCEDURE — 3288F PR FALLS RISK ASSESSMENT DOCUMENTED: ICD-10-PCS | Mod: CPTII,S$GLB,, | Performed by: PODIATRIST

## 2022-08-23 PROCEDURE — 99213 PR OFFICE/OUTPT VISIT, EST, LEVL III, 20-29 MIN: ICD-10-PCS | Mod: S$GLB,,, | Performed by: PODIATRIST

## 2022-08-23 PROCEDURE — 99213 OFFICE O/P EST LOW 20 MIN: CPT | Mod: S$GLB,,, | Performed by: PODIATRIST

## 2022-08-23 PROCEDURE — 3288F FALL RISK ASSESSMENT DOCD: CPT | Mod: CPTII,S$GLB,, | Performed by: PODIATRIST

## 2022-08-23 PROCEDURE — 1101F PR PT FALLS ASSESS DOC 0-1 FALLS W/OUT INJ PAST YR: ICD-10-PCS | Mod: CPTII,S$GLB,, | Performed by: PODIATRIST

## 2022-08-23 PROCEDURE — 1101F PT FALLS ASSESS-DOCD LE1/YR: CPT | Mod: CPTII,S$GLB,, | Performed by: PODIATRIST

## 2022-08-23 NOTE — PATIENT INSTRUCTIONS
Recommend applying vicks or tea tree oil to the affected toenails once daily for 6 months to resolve the fungal infection.    Be sure to put your shoes in a trash bag and spray with lysol once week to effectively rid the shoes of said infection.

## 2022-08-24 NOTE — PROGRESS NOTES
Subjective:      Patient ID: Dolores B LeJeune is a 87 y.o. female.    Chief Complaint: Nail Problem (Pain both great toes)    Kelly is a 87 y.o. female witha past medical history of Acute MI, Anticoagulant long-term use, Carpal tunnel syndrome, Cataracts, bilateral, Colon polyps, Coronary artery disease, DM type 2 (diabetes mellitus, type 2), HTN (hypertension), Hyperlipidemia, Osteoarthritis, and Peripheral neuropathy. The patient's chief complaint consists of painful ingrown toenails of bilateral great toe.  States they are tender with applied pressure from shoe gear.  Rates pain as a 4/10.  Symptoms are alleviated with rest.  She was applying penlac to the nails but discontinued on account of red discoloration of the adjacent skin.  Notes discontinuing said medication as a result.  Inquires as to additional treatment options.  Denies any additional pedal complaints.      Past Medical History:   Diagnosis Date    Acute MI     Anticoagulant long-term use     Carpal tunnel syndrome     Cataracts, bilateral     Colon polyps     Coronary artery disease     DM type 2 (diabetes mellitus, type 2)     HTN (hypertension)     Hyperlipidemia     Osteoarthritis     Peripheral neuropathy        Past Surgical History:   Procedure Laterality Date    CAROTID STENT      1    COLONOSCOPY  1/8/2008  [facility]    A 3 mm by 6 mm polyp in the cecum.  FRAGMENTS OF TUBULAR ADENOMA.    A 1 mm polyp in the recto-sigmoid colon.  HYPERPLASTIC POLYP.    Extremely redundant colon.      CORONARY ARTERY BYPASS GRAFT  01/24/2018    [facility], Dr. Echols. LIMA to LAD, SVG to OM1, SVG to OM2, SVG to PDA    EYE SURGERY      eye surgery    HYSTERECTOMY      ovaries remain    ptyregium      TONSILLECTOMY         Family History   Problem Relation Age of Onset    Tremor Mother     Hypertension Mother     Heart disease Mother     Stroke Father        Social History     Socioeconomic History    Marital status:     Number of  children: 4   Tobacco Use    Smoking status: Former Smoker     Quit date: 3/15/1972     Years since quittin.4    Smokeless tobacco: Never Used   Substance and Sexual Activity    Alcohol use: No    Drug use: No       Current Outpatient Medications   Medication Sig Dispense Refill    ACCU-CHEK SOFTCLIX LANCETS [facility] USE ONE AS DIRECTED TWICE DAILY 200 each 10    aspirin (ECOTRIN) 81 MG EC tablet Take 81 mg by mouth once daily.      atorvastatin (LIPITOR) 40 MG tablet TAKE ONE TABLET BY MOUTH DAILY 90 tablet 0    biotin 5,000 mcg TbDL Take by mouth.      CALCIUM CARBONATE/VITAMIN D3 (CALCIUM 500 WITH D ORAL) Take by mouth.      ciclopirox (PENLAC) 8 % Soln Apply topically nightly. 6.6 mL 11    clopidogreL (PLAVIX) 75 mg tablet TAKE  ONE TABLET BY MOUTH DAILY 90 tablet 1    DEXTRAN 70/HYPROMELLOSE (ARTIFICIAL TEARS OPHT) Apply to eye.      gabapentin (NEURONTIN) 300 MG capsule TAKE ONE CAPSULE BY MOUTH DAILY 90 capsule 3    losartan (COZAAR) 25 MG tablet TAKE  ONE TABLET BY MOUTH DAILY 90 tablet 1    meclizine (ANTIVERT) 25 mg tablet Take 1 tablet by mouth Three times daily as needed.      metFORMIN (GLUCOPHAGE) 500 MG tablet TAKE 1/2 TABLET BY MOUTH TWICE DAILY WITH FOOD 90 tablet 0    metoprolol succinate (TOPROL-XL) 25 MG 24 hr tablet TAKE ONE-HALF TABLET BY MOUTH DAILY 45 tablet 1    MYRBETRIQ 50 mg Tb24 TAKE ONE TABLET BY MOUTH DAILY 90 tablet 1    ONETOUCH DELICA LANCETS 33 gauge Misc       ONETOUCH VERIO TEST STRIPS Strp USE ONE STRIP FOR TESTING TWICE DAILY 200 each 3    RUTIN/HESP/BIOFLAV/C/[id number] (BIOFLEX ORAL) Take 1 tablet by mouth once daily at 6am.      pantoprazole (PROTONIX) 40 MG tablet Take 1 tablet (40 mg total) by mouth once daily. (Patient taking differently: Take 40 mg by mouth as needed.) 90 tablet 1     No current facility-administered medications for this visit.       Review of patient's allergies indicates:   Allergen Reactions    Albuterol Other (See Comments)      Palpitations/tremor      Sulfa (sulfonamide antibiotics) Rash       Hemoglobin A1C   Date Value Ref Range Status   02/24/2022 7.1 (H) 4.0 - 5.6 % Final     Comment:     ADA Screening Guidelines:  5.7-6.4%  Consistent with prediabetes  >or=6.5%  Consistent with diabetes    High levels of fetal hemoglobin interfere with the HbA1C  assay. Heterozygous hemoglobin variants (HbS, HgC, etc)do  not significantly interfere with this assay.   However, presence of multiple variants may affect accuracy.     08/09/2021 6.5 (H) 4.0 - 5.6 % Final     Comment:     ADA Screening Guidelines:  5.7-6.4%  Consistent with prediabetes  >or=6.5%  Consistent with diabetes    High levels of fetal hemoglobin interfere with the HbA1C  assay. Heterozygous hemoglobin variants (HbS, HgC, etc)do  not significantly interfere with this assay.   However, presence of multiple variants may affect accuracy.     01/14/2021 5.9 (H) 4.0 - 5.6 % Final     Comment:     ADA Screening Guidelines:  5.7-6.4%  Consistent with prediabetes  >or=6.5%  Consistent with diabetes  High levels of fetal hemoglobin interfere with the HbA1C  assay. Heterozygous hemoglobin variants (HbS, HgC, etc)do  not significantly interfere with this assay.   However, presence of multiple variants may affect accuracy.         Review of Systems   Constitutional: Negative for chills and fever.   Cardiovascular: Negative for claudication and leg swelling.   Skin: Positive for color change and nail changes.   Musculoskeletal: Negative for joint pain, joint swelling, muscle cramps and muscle weakness.   Gastrointestinal: Negative for nausea and vomiting.   Neurological: Negative for numbness and paresthesias.   Psychiatric/Behavioral: Negative for altered mental status.           Objective:      Physical Exam  Constitutional:       Appearance: Normal appearance. She is not ill-appearing.   Cardiovascular:      Pulses:           Dorsalis pedis pulses are 1+ on the right side and 1+ on the  left side.        Posterior tibial pulses are 1+ on the right side and 1+ on the left side.      Comments: CFT is 3 seconds bilateral.  Pedal hair growth is absent bilateral.  Varicosities noted bilateral.  Moderate non pitting ower extremity edema noted bilateral.  Toes are cool to touch bilateral.    Musculoskeletal:         General: Tenderness present. No signs of injury.      Comments: Muscle strength 5/5 in all muscle groups bilateral.  No tenderness nor crepitation with ROM of foot/ankle joints bilateral.  Pain with palpation of the medial and lateral borders of bilateral hallux toenail.    Skin:     General: Skin is warm and dry.      Capillary Refill: Capillary refill takes more than 3 seconds.      Findings: No bruising, erythema, lesion or rash.      Comments: Increased pedal turgor noted bilateral.  Toenails x 10 appear thickened by 2mm, elongated by 4 mm, and discolored with subungual debris.  No localized sign of bacterial infection noted.    Neurological:      General: No focal deficit present.      Mental Status: She is alert.      Sensory: No sensory deficit.      Motor: No weakness or atrophy.      Comments: Light touch is intact bilateral.                 Assessment:       Encounter Diagnoses   Name Primary?    Toe pain, left Yes    Ingrown toenail of left foot     Onychomycosis due to dermatophyte     PVD (peripheral vascular disease)          Plan:       Kelly was seen today for nail problem.    Diagnoses and all orders for this visit:    Toe pain, left    Ingrown toenail of left foot    Onychomycosis due to dermatophyte    PVD (peripheral vascular disease)      I counseled the patient on her conditions, their implications and medical management.    Utilizing sterile toenail clippers I aggressively trimmed the offending medial and lateral nail borders of bilateral hallux approximately 3 mm from its edge and carried the nail plate incision down at an angle in order to wedge out the offending  cryptotic portion of the nail plate. The offending border was then removed in toto. This was performed without incident.  Patient tolerated the procedure well and related significant relief.    Discussed with patient a variety of treatment options to address onychomycosis including Vincent Vaporub, topical/oral antifungals, and laser therapy.  Patient to discontinue Penlac due to chemical reaction to the adjacent skin.  To switch to daily application of vicks x 6 months.    Advised to regularly clean shoe gear and shower surfaces to limit exposure.    Advised to be wary of walking barefoot on carpeted surfaces at gyms and hotel rooms.  Also, avoid barefoot walking at public showers and pool areas.    RTC for routine high risk exam with either Dr. Dillard or myself.    Lucian Fernandez DPM

## 2022-08-31 ENCOUNTER — PATIENT MESSAGE (OUTPATIENT)
Dept: FAMILY MEDICINE | Facility: CLINIC | Age: 87
End: 2022-08-31
Payer: MEDICARE

## 2022-08-31 DIAGNOSIS — E11.69 TYPE 2 DIABETES MELLITUS WITH HYPERLIPIDEMIA: ICD-10-CM

## 2022-08-31 DIAGNOSIS — E11.59 HYPERTENSION ASSOCIATED WITH DIABETES: Primary | ICD-10-CM

## 2022-08-31 DIAGNOSIS — I15.2 HYPERTENSION ASSOCIATED WITH DIABETES: Primary | ICD-10-CM

## 2022-08-31 DIAGNOSIS — N18.32 STAGE 3B CHRONIC KIDNEY DISEASE: ICD-10-CM

## 2022-08-31 DIAGNOSIS — E78.5 TYPE 2 DIABETES MELLITUS WITH HYPERLIPIDEMIA: ICD-10-CM

## 2022-09-23 DIAGNOSIS — N39.41 URINARY INCONTINENCE, URGE: ICD-10-CM

## 2022-09-23 DIAGNOSIS — E78.00 HYPERCHOLESTEROLEMIA: Primary | ICD-10-CM

## 2022-09-23 NOTE — TELEPHONE ENCOUNTER
Care Due:                  Date            Visit Type   Department     Provider  --------------------------------------------------------------------------------                                EP -                              PRIMARY      [facility] FAMILY  Last Visit: 05-      CARE ([facility])   MEDICINE       BEBO SEVILLA                              MYCHART                              FOLLOWUP/OF  [facility]  Next Visit: 11-      FICE VISIT   MEDICINE       BEBO SEVILLA                                                            Last  Test          Frequency    Reason                     Performed    Due Date  --------------------------------------------------------------------------------    HBA1C.......  6 months...  metFORMIN................  02- 08-    [facility] Embedded Care Gaps. Reference number: [id number]. 9/23/2022   10:54:19 AM CDT

## 2022-09-23 NOTE — TELEPHONE ENCOUNTER
She has a CBC and CMP ordered  Do you want these linked into her upcoming 11/1 lab visit?  Last done 2/2022

## 2022-09-26 RX ORDER — MIRABEGRON 50 MG/1
TABLET, FILM COATED, EXTENDED RELEASE ORAL
Qty: 90 TABLET | Refills: 1 | Status: SHIPPED | OUTPATIENT
Start: 2022-09-26 | End: 2023-03-29 | Stop reason: SDUPTHER

## 2022-10-04 ENCOUNTER — PES CALL (OUTPATIENT)
Dept: ADMINISTRATIVE | Facility: CLINIC | Age: 87
End: 2022-10-04
Payer: MEDICARE

## 2022-11-01 ENCOUNTER — LAB VISIT (OUTPATIENT)
Dept: LAB | Facility: HOSPITAL | Age: 87
End: 2022-11-01
Attending: FAMILY MEDICINE
Payer: MEDICARE

## 2022-11-01 DIAGNOSIS — E11.69 TYPE 2 DIABETES MELLITUS WITH HYPERLIPIDEMIA: ICD-10-CM

## 2022-11-01 DIAGNOSIS — E78.00 HYPERCHOLESTEROLEMIA: ICD-10-CM

## 2022-11-01 DIAGNOSIS — E78.5 TYPE 2 DIABETES MELLITUS WITH HYPERLIPIDEMIA: ICD-10-CM

## 2022-11-01 DIAGNOSIS — E11.59 HYPERTENSION ASSOCIATED WITH DIABETES: ICD-10-CM

## 2022-11-01 DIAGNOSIS — N18.32 STAGE 3B CHRONIC KIDNEY DISEASE: ICD-10-CM

## 2022-11-01 DIAGNOSIS — I15.2 HYPERTENSION ASSOCIATED WITH DIABETES: ICD-10-CM

## 2022-11-01 LAB
ALBUMIN SERPL BCP-MCNC: 3.6 G/DL (ref 3.5–5.2)
ALP SERPL-CCNC: 75 U/L (ref 55–135)
ALT SERPL W/O P-5'-P-CCNC: 17 U/L (ref 10–44)
ANION GAP SERPL CALC-SCNC: 10 MMOL/L (ref 8–16)
AST SERPL-CCNC: 23 U/L (ref 10–40)
BASOPHILS # BLD AUTO: 0.03 K/UL (ref 0–0.2)
BASOPHILS NFR BLD: 0.3 % (ref 0–1.9)
BILIRUB SERPL-MCNC: 0.5 MG/DL (ref 0.1–1)
BUN SERPL-MCNC: 25 MG/DL (ref 8–23)
CALCIUM SERPL-MCNC: 9.3 MG/DL (ref 8.7–10.5)
CHLORIDE SERPL-SCNC: 101 MMOL/L (ref 95–110)
CHOLEST SERPL-MCNC: 125 MG/DL (ref 120–199)
CHOLEST/HDLC SERPL: 3 {RATIO} (ref 2–5)
CO2 SERPL-SCNC: 24 MMOL/L (ref 23–29)
CREAT SERPL-MCNC: 1.3 MG/DL (ref 0.5–1.4)
DIFFERENTIAL METHOD: ABNORMAL
EOSINOPHIL # BLD AUTO: 0.3 K/UL (ref 0–0.5)
EOSINOPHIL NFR BLD: 2.7 % (ref 0–8)
ERYTHROCYTE [DISTWIDTH] IN BLOOD BY AUTOMATED COUNT: 13 % (ref 11.5–14.5)
EST. GFR  (NO RACE VARIABLE): 40 ML/MIN/1.73 M^2
ESTIMATED AVG GLUCOSE: 163 MG/DL (ref 68–131)
GLUCOSE SERPL-MCNC: 143 MG/DL (ref 70–110)
HBA1C MFR BLD: 7.3 % (ref 4–5.6)
HCT VFR BLD AUTO: 36.9 % (ref 37–48.5)
HDLC SERPL-MCNC: 41 MG/DL (ref 40–75)
HDLC SERPL: 32.8 % (ref 20–50)
HGB BLD-MCNC: 11.3 G/DL (ref 12–16)
IMM GRANULOCYTES # BLD AUTO: 0.02 K/UL (ref 0–0.04)
IMM GRANULOCYTES NFR BLD AUTO: 0.2 % (ref 0–0.5)
LDLC SERPL CALC-MCNC: 65.8 MG/DL (ref 63–159)
LYMPHOCYTES # BLD AUTO: 1.9 K/UL (ref 1–4.8)
LYMPHOCYTES NFR BLD: 19.8 % (ref 18–48)
MCH RBC QN AUTO: 29.8 PG (ref 27–31)
MCHC RBC AUTO-ENTMCNC: 30.6 G/DL (ref 32–36)
MCV RBC AUTO: 97 FL (ref 82–98)
MONOCYTES # BLD AUTO: 0.5 K/UL (ref 0.3–1)
MONOCYTES NFR BLD: 5.7 % (ref 4–15)
NEUTROPHILS # BLD AUTO: 6.7 K/UL (ref 1.8–7.7)
NEUTROPHILS NFR BLD: 71.3 % (ref 38–73)
NONHDLC SERPL-MCNC: 84 MG/DL
NRBC BLD-RTO: 0 /100 WBC
PLATELET # BLD AUTO: 270 K/UL (ref 150–450)
PMV BLD AUTO: 10.9 FL (ref 9.2–12.9)
POTASSIUM SERPL-SCNC: 4.4 MMOL/L (ref 3.5–5.1)
PROT SERPL-MCNC: 7 G/DL (ref 6–8.4)
RBC # BLD AUTO: 3.79 M/UL (ref 4–5.4)
SODIUM SERPL-SCNC: 135 MMOL/L (ref 136–145)
TRIGL SERPL-MCNC: 91 MG/DL (ref 30–150)
WBC # BLD AUTO: 9.33 K/UL (ref 3.9–12.7)

## 2022-11-01 PROCEDURE — 85025 COMPLETE CBC W/AUTO DIFF WBC: CPT | Performed by: FAMILY MEDICINE

## 2022-11-01 PROCEDURE — 83036 HEMOGLOBIN GLYCOSYLATED A1C: CPT | Performed by: FAMILY MEDICINE

## 2022-11-01 PROCEDURE — 80053 COMPREHEN METABOLIC PANEL: CPT | Performed by: FAMILY MEDICINE

## 2022-11-01 PROCEDURE — 80061 LIPID PANEL: CPT | Performed by: FAMILY MEDICINE

## 2022-11-01 PROCEDURE — 36415 COLL VENOUS BLD VENIPUNCTURE: CPT | Mod: PO | Performed by: FAMILY MEDICINE

## 2022-11-03 ENCOUNTER — OFFICE VISIT (OUTPATIENT)
Dept: PODIATRY | Facility: CLINIC | Age: 87
End: 2022-11-03
Payer: MEDICARE

## 2022-11-03 VITALS — WEIGHT: 143.94 LBS | BODY MASS INDEX: 29.02 KG/M2 | HEIGHT: 59 IN

## 2022-11-03 DIAGNOSIS — I73.9 PVD (PERIPHERAL VASCULAR DISEASE): Primary | ICD-10-CM

## 2022-11-03 DIAGNOSIS — B35.1 ONYCHOMYCOSIS DUE TO DERMATOPHYTE: ICD-10-CM

## 2022-11-03 PROCEDURE — 99999 PR PBB SHADOW E&M-EST. PATIENT-LVL II: CPT | Mod: PBBFAC,,, | Performed by: PODIATRIST

## 2022-11-03 PROCEDURE — 1101F PR PT FALLS ASSESS DOC 0-1 FALLS W/OUT INJ PAST YR: ICD-10-PCS | Mod: CPTII,S$GLB,, | Performed by: PODIATRIST

## 2022-11-03 PROCEDURE — 11721 DEBRIDE NAIL 6 OR MORE: CPT | Mod: Q9,S$GLB,, | Performed by: PODIATRIST

## 2022-11-03 PROCEDURE — 1125F AMNT PAIN NOTED PAIN PRSNT: CPT | Mod: CPTII,S$GLB,, | Performed by: PODIATRIST

## 2022-11-03 PROCEDURE — 99499 UNLISTED E&M SERVICE: CPT | Mod: S$GLB,,, | Performed by: PODIATRIST

## 2022-11-03 PROCEDURE — 3288F PR FALLS RISK ASSESSMENT DOCUMENTED: ICD-10-PCS | Mod: CPTII,S$GLB,, | Performed by: PODIATRIST

## 2022-11-03 PROCEDURE — 1125F PR PAIN SEVERITY QUANTIFIED, PAIN PRESENT: ICD-10-PCS | Mod: CPTII,S$GLB,, | Performed by: PODIATRIST

## 2022-11-03 PROCEDURE — 99999 PR PBB SHADOW E&M-EST. PATIENT-LVL II: ICD-10-PCS | Mod: PBBFAC,,, | Performed by: PODIATRIST

## 2022-11-03 PROCEDURE — 1101F PT FALLS ASSESS-DOCD LE1/YR: CPT | Mod: CPTII,S$GLB,, | Performed by: PODIATRIST

## 2022-11-03 PROCEDURE — 1159F MED LIST DOCD IN RCRD: CPT | Mod: CPTII,S$GLB,, | Performed by: PODIATRIST

## 2022-11-03 PROCEDURE — 99499 NO LOS: ICD-10-PCS | Mod: S$GLB,,, | Performed by: PODIATRIST

## 2022-11-03 PROCEDURE — 1159F PR MEDICATION LIST DOCUMENTED IN MEDICAL RECORD: ICD-10-PCS | Mod: CPTII,S$GLB,, | Performed by: PODIATRIST

## 2022-11-03 PROCEDURE — 11721 PR DEBRIDEMENT OF NAILS, 6 OR MORE: ICD-10-PCS | Mod: Q9,S$GLB,, | Performed by: PODIATRIST

## 2022-11-03 PROCEDURE — 3288F FALL RISK ASSESSMENT DOCD: CPT | Mod: CPTII,S$GLB,, | Performed by: PODIATRIST

## 2022-11-04 NOTE — PROGRESS NOTES
Subjective:      Patient ID: Dolores B LeJeune is a 87 y.o. female.    Chief Complaint: Peripheral Vascular Disease ([place] )    Kelly is a 87 y.o. female witha past medical history of Acute MI, Anticoagulant long-term use, Carpal tunnel syndrome, Cataracts, bilateral, Colon polyps, Coronary artery disease, DM type 2 (diabetes mellitus, type 2), HTN (hypertension), Hyperlipidemia, Osteoarthritis, and Peripheral neuropathy. The patient's chief complaint consists of toenails that are in need of trimming.  Denies experiencing pain from the nails with today's exam.  Has not attempted to self treat.  Denies any additional pedal complaints.      PCP: Silvia [place] MD  Last visit:   Past Medical History:   Diagnosis Date    Acute MI     Anticoagulant long-term use     Carpal tunnel syndrome     Cataracts, bilateral     Colon polyps     Coronary artery disease     DM type 2 (diabetes mellitus, type 2)     HTN (hypertension)     Hyperlipidemia     Osteoarthritis     Peripheral neuropathy        Past Surgical History:   Procedure Laterality Date    CAROTID STENT      1    COLONOSCOPY  2008  [facility]    A 3 mm by 6 mm polyp in the cecum.  FRAGMENTS OF TUBULAR ADENOMA.    A 1 mm polyp in the recto-sigmoid colon.  HYPERPLASTIC POLYP.    Extremely redundant colon.      CORONARY ARTERY BYPASS GRAFT  2018    [facility], Dr. Echols. LIMA to LAD, SVG to OM1, SVG to OM2, SVG to PDA    EYE SURGERY      eye surgery    HYSTERECTOMY      ovaries remain    ptyregium      TONSILLECTOMY         Family History   Problem Relation Age of Onset    Tremor Mother     Hypertension Mother     Heart disease Mother     Stroke Father        Social History     Socioeconomic History    Marital status:     Number of children: 4   Tobacco Use    Smoking status: Former     Types: Cigarettes     Quit date: 3/15/1972     Years since quittin.6    Smokeless tobacco: Never   Substance and Sexual Activity    Alcohol use: No    Drug use: No        Current Outpatient Medications   Medication Sig Dispense Refill    ACCU-CHEK SOFTCLIX LANCETS [facility] [place] ONE AS DIRECTED TWICE DAILY 200 each 10    aspirin (ECOTRIN) 81 MG EC tablet Take 81 mg by mouth once daily.      atorvastatin (LIPITOR) 40 MG tablet TAKE ONE TABLET BY MOUTH DAILY 90 tablet 0    biotin 5,000 mcg TbDL Take by mouth.      CALCIUM CARBONATE/VITAMIN D3 (CALCIUM 500 WITH D ORAL) Take by mouth.      ciclopirox (PENLAC) 8 % Soln Apply topically nightly. 6.6 mL 11    clopidogreL (PLAVIX) 75 mg tablet TAKE  ONE TABLET BY MOUTH DAILY 90 tablet 1    DEXTRAN 70/HYPROMELLOSE (ARTIFICIAL TEARS OPHT) Apply to eye.      gabapentin (NEURONTIN) 300 MG capsule TAKE ONE CAPSULE BY MOUTH DAILY 90 capsule 3    losartan (COZAAR) 25 MG tablet TAKE ONE TABLET BY MOUTH DAILY 90 tablet 0    meclizine (ANTIVERT) 25 mg tablet Take 1 tablet by mouth Three times daily as needed.      metFORMIN (GLUCOPHAGE) 500 MG tablet TAKE 1/2 TABLET BY MOUTH TWICE DAILY WITH FOOD 90 tablet 0    metoprolol succinate (TOPROL-XL) 25 MG 24 hr tablet TAKE ONE-HALF TABLET BY MOUTH DAILY 45 tablet 1    MYRBETRIQ 50 mg Tb24 TAKE ONE TABLET BY MOUTH DAILY 90 tablet 1    ONETOUCH DELICA LANCETS 33 gauge Misc       ONETOUCH VERIO TEST STRIPS Strp USE ONE STRIP FOR TESTING TWICE DAILY 200 each 3    RUTIN/HESP/BIOFLAV/C/[id number] (BIOFLEX ORAL) Take 1 tablet by mouth once daily at 6am.      pantoprazole (PROTONIX) 40 MG tablet Take 1 tablet (40 mg total) by mouth once daily. (Patient taking differently: Take 40 mg by mouth as needed.) 90 tablet 1     No current facility-administered medications for this visit.       Review of patient's allergies indicates:   Allergen Reactions    Albuterol Other (See Comments)     Palpitations/tremor      Sulfa (sulfonamide antibiotics) Rash       Hemoglobin A1C   Date Value Ref Range Status   11/01/2022 7.3 (H) 4.0 - 5.6 % Final     Comment:     ADA Screening Guidelines:  5.7-6.4%  Consistent with  prediabetes  >or=6.5%  Consistent with diabetes    High levels of fetal hemoglobin interfere with the HbA1C  assay. Heterozygous hemoglobin variants (HbS, HgC, etc)do  not significantly interfere with this assay.   However, presence of multiple variants may affect accuracy.     02/24/2022 7.1 (H) 4.0 - 5.6 % Final     Comment:     ADA Screening Guidelines:  5.7-6.4%  Consistent with prediabetes  >or=6.5%  Consistent with diabetes    High levels of fetal hemoglobin interfere with the HbA1C  assay. Heterozygous hemoglobin variants (HbS, HgC, etc)do  not significantly interfere with this assay.   However, presence of multiple variants may affect accuracy.     08/09/2021 6.5 (H) 4.0 - 5.6 % Final     Comment:     ADA Screening Guidelines:  5.7-6.4%  Consistent with prediabetes  >or=6.5%  Consistent with diabetes    High levels of fetal hemoglobin interfere with the HbA1C  assay. Heterozygous hemoglobin variants (HbS, HgC, etc)do  not significantly interfere with this assay.   However, presence of multiple variants may affect accuracy.         Review of Systems   Constitutional: Negative for chills and fever.   Cardiovascular:  Negative for claudication and leg swelling.   Skin:  Positive for color change and nail changes.   Musculoskeletal:  Negative for joint pain, joint swelling, muscle cramps and muscle weakness.   Gastrointestinal:  Negative for nausea and vomiting.   Neurological:  Negative for numbness and paresthesias.   Psychiatric/Behavioral:  Negative for altered mental status.          Objective:      Physical Exam  Constitutional:       Appearance: Normal appearance. She is not ill-appearing.   Cardiovascular:      Pulses:           Dorsalis pedis pulses are 1+ on the right side and 1+ on the left side.        Posterior tibial pulses are 1+ on the right side and 1+ on the left side.      Comments: CFT is 3 seconds bilateral.  Pedal hair growth is absent bilateral.  Varicosities noted bilateral.  Moderate non  pitting ower extremity edema noted bilateral.  Toes are cool to touch bilateral.    Musculoskeletal:         General: No tenderness or signs of injury.      Comments: Muscle strength 5/5 in all muscle groups bilateral.  No tenderness nor crepitation with ROM of foot/ankle joints bilateral.  (-) for pain with palpation of the medial and lateral borders of bilateral hallux toenail.    Skin:     General: Skin is warm and dry.      Capillary Refill: Capillary refill takes more than 3 seconds.      Findings: No bruising, erythema, lesion or rash.      Comments: Increased pedal turgor noted bilateral.  Toenails x 10 appear thickened by 2mm, elongated by 4 mm, and discolored with subungual debris.  No localized sign of bacterial infection noted.    Neurological:      General: No focal deficit present.      Mental Status: She is alert.      Sensory: No sensory deficit.      Motor: No weakness or atrophy.      Comments: Light touch is intact bilateral.               Assessment:       Encounter Diagnoses   Name Primary?    PVD (peripheral vascular disease) Yes    Onychomycosis due to dermatophyte          Plan:       Kelly was seen today for peripheral vascular disease.    Diagnoses and all orders for this visit:    PVD (peripheral vascular disease)    Onychomycosis due to dermatophyte    I counseled the patient on her conditions, their implications and medical management.    PVD remains stable with today's exam.    Patient instructed on proper foot hygeine. We discussed wearing proper shoe gear, daily foot inspections, never walking without protective shoe gear, never putting sharp instruments to feet    With patient's permission, nails were aggressively reduced and debrided x 10 to their soft tissue attachment mechanically and with electric , removing all offending nail and debris. Patient relates relief following the procedure. She will continue to monitor the areas daily, inspect her feet, wear protective shoe  gear when ambulatory, moisturizer to maintain skin integrity and follow in this office in approximately 3 months, sooner p.r.n.    RTC for routine high risk exam in three months.      Lucian Fernandez DPM

## 2022-11-08 ENCOUNTER — OFFICE VISIT (OUTPATIENT)
Dept: FAMILY MEDICINE | Facility: CLINIC | Age: 87
End: 2022-11-08
Payer: MEDICARE

## 2022-11-08 VITALS
HEIGHT: 59 IN | BODY MASS INDEX: 30.09 KG/M2 | OXYGEN SATURATION: 97 % | HEART RATE: 87 BPM | SYSTOLIC BLOOD PRESSURE: 138 MMHG | DIASTOLIC BLOOD PRESSURE: 62 MMHG | WEIGHT: 149.25 LBS

## 2022-11-08 DIAGNOSIS — E78.5 TYPE 2 DIABETES MELLITUS WITH HYPERLIPIDEMIA: ICD-10-CM

## 2022-11-08 DIAGNOSIS — E11.59 HYPERTENSION ASSOCIATED WITH DIABETES: Primary | ICD-10-CM

## 2022-11-08 DIAGNOSIS — I15.2 HYPERTENSION ASSOCIATED WITH DIABETES: Primary | ICD-10-CM

## 2022-11-08 DIAGNOSIS — E11.69 TYPE 2 DIABETES MELLITUS WITH HYPERLIPIDEMIA: ICD-10-CM

## 2022-11-08 DIAGNOSIS — N18.32 STAGE 3B CHRONIC KIDNEY DISEASE: ICD-10-CM

## 2022-11-08 DIAGNOSIS — E11.21 TYPE 2 DIABETES MELLITUS WITH DIABETIC NEPHROPATHY, WITHOUT LONG-TERM CURRENT USE OF INSULIN: ICD-10-CM

## 2022-11-08 DIAGNOSIS — I25.5 ISCHEMIC CARDIOMYOPATHY: ICD-10-CM

## 2022-11-08 PROCEDURE — 1101F PT FALLS ASSESS-DOCD LE1/YR: CPT | Mod: CPTII,S$GLB,, | Performed by: FAMILY MEDICINE

## 2022-11-08 PROCEDURE — 99214 OFFICE O/P EST MOD 30 MIN: CPT | Mod: S$GLB,,, | Performed by: FAMILY MEDICINE

## 2022-11-08 PROCEDURE — 99999 PR PBB SHADOW E&M-EST. PATIENT-LVL IV: CPT | Mod: PBBFAC,,, | Performed by: FAMILY MEDICINE

## 2022-11-08 PROCEDURE — 1159F MED LIST DOCD IN RCRD: CPT | Mod: CPTII,S$GLB,, | Performed by: FAMILY MEDICINE

## 2022-11-08 PROCEDURE — 1160F PR REVIEW ALL MEDS BY PRESCRIBER/CLIN PHARMACIST DOCUMENTED: ICD-10-PCS | Mod: CPTII,S$GLB,, | Performed by: FAMILY MEDICINE

## 2022-11-08 PROCEDURE — 1160F RVW MEDS BY RX/DR IN RCRD: CPT | Mod: CPTII,S$GLB,, | Performed by: FAMILY MEDICINE

## 2022-11-08 PROCEDURE — 99214 PR OFFICE/OUTPT VISIT, EST, LEVL IV, 30-39 MIN: ICD-10-PCS | Mod: S$GLB,,, | Performed by: FAMILY MEDICINE

## 2022-11-08 PROCEDURE — 1159F PR MEDICATION LIST DOCUMENTED IN MEDICAL RECORD: ICD-10-PCS | Mod: CPTII,S$GLB,, | Performed by: FAMILY MEDICINE

## 2022-11-08 PROCEDURE — 3288F FALL RISK ASSESSMENT DOCD: CPT | Mod: CPTII,S$GLB,, | Performed by: FAMILY MEDICINE

## 2022-11-08 PROCEDURE — 99999 PR PBB SHADOW E&M-EST. PATIENT-LVL IV: ICD-10-PCS | Mod: PBBFAC,,, | Performed by: FAMILY MEDICINE

## 2022-11-08 PROCEDURE — 1126F PR PAIN SEVERITY QUANTIFIED, NO PAIN PRESENT: ICD-10-PCS | Mod: CPTII,S$GLB,, | Performed by: FAMILY MEDICINE

## 2022-11-08 PROCEDURE — 3288F PR FALLS RISK ASSESSMENT DOCUMENTED: ICD-10-PCS | Mod: CPTII,S$GLB,, | Performed by: FAMILY MEDICINE

## 2022-11-08 PROCEDURE — 1126F AMNT PAIN NOTED NONE PRSNT: CPT | Mod: CPTII,S$GLB,, | Performed by: FAMILY MEDICINE

## 2022-11-08 PROCEDURE — 1101F PR PT FALLS ASSESS DOC 0-1 FALLS W/OUT INJ PAST YR: ICD-10-PCS | Mod: CPTII,S$GLB,, | Performed by: FAMILY MEDICINE

## 2022-11-08 NOTE — PROGRESS NOTES
"Subjective:       Patient ID: Dolores B LeJeune is a 87 y.o. female.    Chief Complaint: Follow-up    Pt is known to me.  Pt is here for followup of chronic medical issues.  We reviewed her recent excellent lab values.  We discussed the need to stay well hydrated.  The pt's daughter is here with her.  All of her questions were answered.    Follow-up  Associated symptoms include arthralgias. Pertinent negatives include no abdominal pain, chest pain, coughing, fatigue, headaches, nausea, numbness, rash or vomiting.   Review of Systems   Constitutional:  Negative for activity change, appetite change, fatigue and unexpected weight change.   Eyes:  Negative for visual disturbance.   Respiratory:  Negative for cough, chest tightness and shortness of breath.    Cardiovascular:  Negative for chest pain, palpitations and leg swelling.   Gastrointestinal:  Negative for abdominal pain, constipation, diarrhea, nausea and vomiting.   Endocrine: Negative for cold intolerance, heat intolerance and polyuria.   Genitourinary:  Positive for enuresis (when she holds her urine). Negative for decreased urine volume, dysuria and urgency.   Musculoskeletal:  Positive for arthralgias. Negative for back pain.   Skin:  Negative for rash.   Neurological:  Negative for numbness and headaches.   Psychiatric/Behavioral:  Negative for confusion and sleep disturbance.      Objective:      Vitals:    11/08/22 0948   BP: 138/62   Pulse: 87   SpO2: 97%   Weight: 67.7 kg (149 lb 4 oz)   Height: 4' 11" (1.499 m)      Physical Exam  Vitals and nursing note reviewed.   Constitutional:       Appearance: She is well-developed.   HENT:      Head: Normocephalic.   Eyes:      Conjunctiva/sclera: Conjunctivae normal.      Pupils: Pupils are equal, round, and reactive to light.   Neck:      Thyroid: No thyromegaly.   Cardiovascular:      Rate and Rhythm: Normal rate and regular rhythm.      Pulses:           Dorsalis pedis pulses are 2+ on the right side and 2+ " on the left side.        Posterior tibial pulses are 2+ on the right side and 2+ on the left side.      Heart sounds: Normal heart sounds.   Pulmonary:      Effort: Pulmonary effort is normal.      Breath sounds: Normal breath sounds.   Abdominal:      General: Bowel sounds are normal.      Palpations: Abdomen is soft.      Tenderness: There is no abdominal tenderness.   Musculoskeletal:         General: No tenderness or deformity. Normal range of motion.      Cervical back: Normal range of motion and neck supple.      Right lower leg: Edema (mild) present.      Left lower leg: Edema (mild) present.      Right foot: Normal range of motion. No deformity.      Left foot: Normal range of motion. No deformity.   Feet:      Right foot:      Protective Sensation: 7 sites tested.  7 sites sensed.      Skin integrity: No ulcer.      Left foot:      Protective Sensation: 7 sites tested.  7 sites sensed.      Skin integrity: No ulcer.   Lymphadenopathy:      Cervical: No cervical adenopathy.   Skin:     General: Skin is warm and dry.   Neurological:      Mental Status: She is alert and oriented to person, place, and time.      Cranial Nerves: No cranial nerve deficit.      Motor: No abnormal muscle tone.      Coordination: Coordination normal.      Deep Tendon Reflexes: Reflexes normal.   Psychiatric:         Behavior: Behavior normal.       Assessment:       1. Hypertension associated with diabetes    2. Stage 3b chronic kidney disease    3. Type 2 diabetes mellitus with hyperlipidemia    4. Type 2 diabetes mellitus with diabetic nephropathy, without long-term current use of insulin    5. Ischemic cardiomyopathy        Plan:       Kelly was seen today for follow-up.    Diagnoses and all orders for this visit:    Hypertension associated with diabetes    Stage 3b chronic kidney disease    Type 2 diabetes mellitus with hyperlipidemia    Type 2 diabetes mellitus with diabetic nephropathy, without long-term current use of  insulin    Ischemic cardiomyopathy    During this visit, I reviewed the pt's history, medications, allergies, and problem list.

## 2022-12-13 ENCOUNTER — OFFICE VISIT (OUTPATIENT)
Dept: CARDIOLOGY | Facility: CLINIC | Age: 87
End: 2022-12-13
Payer: MEDICARE

## 2022-12-13 VITALS
HEART RATE: 84 BPM | SYSTOLIC BLOOD PRESSURE: 124 MMHG | WEIGHT: 148.56 LBS | DIASTOLIC BLOOD PRESSURE: 63 MMHG | HEIGHT: 59 IN | BODY MASS INDEX: 29.95 KG/M2

## 2022-12-13 DIAGNOSIS — E78.00 HYPERCHOLESTEROLEMIA: Chronic | ICD-10-CM

## 2022-12-13 DIAGNOSIS — R42 ORTHOSTATIC DIZZINESS: ICD-10-CM

## 2022-12-13 DIAGNOSIS — I25.10 CORONARY ARTERY DISEASE INVOLVING NATIVE CORONARY ARTERY OF NATIVE HEART WITHOUT ANGINA PECTORIS: Chronic | ICD-10-CM

## 2022-12-13 DIAGNOSIS — E66.9 OBESITY, CLASS I, BMI 30-34.9: Chronic | ICD-10-CM

## 2022-12-13 DIAGNOSIS — Z79.02 LONG TERM (CURRENT) USE OF ANTITHROMBOTICS/ANTIPLATELETS: Chronic | ICD-10-CM

## 2022-12-13 DIAGNOSIS — I25.5 ISCHEMIC CARDIOMYOPATHY: Primary | Chronic | ICD-10-CM

## 2022-12-13 PROBLEM — E66.811 OBESITY, CLASS I, BMI 30-34.9: Status: ACTIVE | Noted: 2022-12-13

## 2022-12-13 PROCEDURE — 1126F PR PAIN SEVERITY QUANTIFIED, NO PAIN PRESENT: ICD-10-PCS | Mod: CPTII,S$GLB,, | Performed by: INTERNAL MEDICINE

## 2022-12-13 PROCEDURE — 99999 PR PBB SHADOW E&M-EST. PATIENT-LVL III: CPT | Mod: PBBFAC,,, | Performed by: INTERNAL MEDICINE

## 2022-12-13 PROCEDURE — 1101F PT FALLS ASSESS-DOCD LE1/YR: CPT | Mod: CPTII,S$GLB,, | Performed by: INTERNAL MEDICINE

## 2022-12-13 PROCEDURE — 1101F PR PT FALLS ASSESS DOC 0-1 FALLS W/OUT INJ PAST YR: ICD-10-PCS | Mod: CPTII,S$GLB,, | Performed by: INTERNAL MEDICINE

## 2022-12-13 PROCEDURE — 1159F MED LIST DOCD IN RCRD: CPT | Mod: CPTII,S$GLB,, | Performed by: INTERNAL MEDICINE

## 2022-12-13 PROCEDURE — 99214 PR OFFICE/OUTPT VISIT, EST, LEVL IV, 30-39 MIN: ICD-10-PCS | Mod: S$GLB,,, | Performed by: INTERNAL MEDICINE

## 2022-12-13 PROCEDURE — 1159F PR MEDICATION LIST DOCUMENTED IN MEDICAL RECORD: ICD-10-PCS | Mod: CPTII,S$GLB,, | Performed by: INTERNAL MEDICINE

## 2022-12-13 PROCEDURE — 3288F PR FALLS RISK ASSESSMENT DOCUMENTED: ICD-10-PCS | Mod: CPTII,S$GLB,, | Performed by: INTERNAL MEDICINE

## 2022-12-13 PROCEDURE — 99214 OFFICE O/P EST MOD 30 MIN: CPT | Mod: S$GLB,,, | Performed by: INTERNAL MEDICINE

## 2022-12-13 PROCEDURE — 3288F FALL RISK ASSESSMENT DOCD: CPT | Mod: CPTII,S$GLB,, | Performed by: INTERNAL MEDICINE

## 2022-12-13 PROCEDURE — 1126F AMNT PAIN NOTED NONE PRSNT: CPT | Mod: CPTII,S$GLB,, | Performed by: INTERNAL MEDICINE

## 2022-12-13 PROCEDURE — 99999 PR PBB SHADOW E&M-EST. PATIENT-LVL III: ICD-10-PCS | Mod: PBBFAC,,, | Performed by: INTERNAL MEDICINE

## 2022-12-13 RX ORDER — LOSARTAN POTASSIUM 25 MG/1
12.5 TABLET ORAL NIGHTLY
Qty: 45 TABLET | Refills: 1 | Status: SHIPPED | OUTPATIENT
Start: 2022-12-13 | End: 2023-07-17

## 2022-12-13 RX ORDER — METOPROLOL SUCCINATE 25 MG/1
12.5 TABLET, EXTENDED RELEASE ORAL DAILY
Qty: 45 TABLET | Refills: 1 | Status: SHIPPED | OUTPATIENT
Start: 2022-12-13 | End: 2023-08-14

## 2022-12-13 RX ORDER — ATORVASTATIN CALCIUM 40 MG/1
40 TABLET, FILM COATED ORAL DAILY
Qty: 90 TABLET | Refills: 1 | Status: SHIPPED | OUTPATIENT
Start: 2022-12-13 | End: 2023-06-13 | Stop reason: SDUPTHER

## 2022-12-13 NOTE — PROGRESS NOTES
Subjective:    Patient ID:  Dolores B LeJeune is a 87 y.o. female who presents for Cardiomyopathy        HPI    NO F/U SINCE , RECENT LABS NOTED LDL 65 HDL 41 TRIGLYCERIDE 91, HB A1C 7.3%, CMP WITH GFR OF 40, HEMOGLOBIN 11.3 UP,LOST  IN FEB DUE TO COVID, OCC DIZZINESS, MOSTLY ORTHOSTATIC,HAD CATRACT SURGERY IN July,  SEE ROS  Past Medical History:   Diagnosis Date    Acute MI     Anticoagulant long-term use     Carpal tunnel syndrome     Cataracts, bilateral     Colon polyps     Coronary artery disease     DM type 2 (diabetes mellitus, type 2)     HTN (hypertension)     Hyperlipidemia     Osteoarthritis     Peripheral neuropathy      Past Surgical History:   Procedure Laterality Date    CAROTID STENT      1    COLONOSCOPY  2008  [facility]    A 3 mm by 6 mm polyp in the cecum.  FRAGMENTS OF TUBULAR ADENOMA.    A 1 mm polyp in the recto-sigmoid colon.  HYPERPLASTIC POLYP.    Extremely redundant colon.      CORONARY ARTERY BYPASS GRAFT  2018    [facility], Dr. Echols. LIMA to LAD, SVG to OM1, SVG to OM2, SVG to PDA    EYE SURGERY      eye surgery    HYSTERECTOMY      ovaries remain    ptyregium      TONSILLECTOMY       Family History   Problem Relation Age of Onset    Tremor Mother     Hypertension Mother     Heart disease Mother     Stroke Father      Social History     Socioeconomic History    Marital status:     Number of children: 4   Tobacco Use    Smoking status: Former     Types: Cigarettes     Quit date: 3/15/1972     Years since quittin.7    Smokeless tobacco: Never   Substance and Sexual Activity    Alcohol use: No    Drug use: No       Review of patient's allergies indicates:   Allergen Reactions    Albuterol Other (See Comments)     Palpitations/tremor      Sulfa (sulfonamide antibiotics) Rash       Current Outpatient Medications:     ACCU-CHEK SOFTCLIX LANCETS Misc, USE ONE AS DIRECTED TWICE DAILY, Disp: 200 each, Rfl: 10    aspirin (ECOTRIN) 81 MG EC tablet, Take 81 mg by mouth  once daily., Disp: , Rfl:     biotin 5,000 mcg TbDL, Take by mouth., Disp: , Rfl:     CALCIUM CARBONATE/VITAMIN D3 (CALCIUM 500 WITH D ORAL), Take by mouth., Disp: , Rfl:     ciclopirox (PENLAC) 8 % Soln, Apply topically nightly., Disp: 6.6 mL, Rfl: 11    clopidogreL (PLAVIX) 75 mg tablet, TAKE  ONE TABLET BY MOUTH DAILY, Disp: 90 tablet, Rfl: 1    DEXTRAN 70/HYPROMELLOSE (ARTIFICIAL TEARS OPHT), Apply to eye., Disp: , Rfl:     gabapentin (NEURONTIN) 300 MG capsule, TAKE ONE CAPSULE BY MOUTH DAILY, Disp: 90 capsule, Rfl: 3    meclizine (ANTIVERT) 25 mg tablet, Take 1 tablet by mouth Three times daily as needed., Disp: , Rfl:     metFORMIN (GLUCOPHAGE) 500 MG tablet, TAKE 1/2 TABLET BY MOUTH TWICE DAILY WITH FOOD, Disp: 90 tablet, Rfl: 1    MYRBETRIQ 50 mg Tb24, TAKE ONE TABLET BY MOUTH DAILY, Disp: 90 tablet, Rfl: 1    ONETOUCH DELICA LANCETS 33 gauge Misc, , Disp: , Rfl:     ONETOUCH VERIO TEST STRIPS Strp, USE ONE STRIP FOR TESTING TWICE DAILY, Disp: 200 each, Rfl: 3    RUTIN/HESP/BIOFLAV/C/[id number] (BIOFLEX ORAL), Take 1 tablet by mouth once daily at 6am., Disp: , Rfl:     atorvastatin (LIPITOR) 40 MG tablet, Take 1 tablet (40 mg total) by mouth once daily., Disp: 90 tablet, Rfl: 1    losartan (COZAAR) 25 MG tablet, Take 0.5 tablets (12.5 mg total) by mouth every evening., Disp: 45 tablet, Rfl: 1    metoprolol succinate (TOPROL-XL) 25 MG 24 hr tablet, Take 0.5 tablets (12.5 mg total) by mouth once daily., Disp: 45 tablet, Rfl: 1    pantoprazole (PROTONIX) 40 MG tablet, Take 1 tablet (40 mg total) by mouth once daily. (Patient taking differently: Take 40 mg by mouth as needed.), Disp: 90 tablet, Rfl: 1    Review of Systems   Constitutional: Negative for chills, diaphoresis, fever, malaise/fatigue, night sweats and weight gain.   HENT:  Negative for congestion and nosebleeds.    Eyes:  Negative for blurred vision and visual disturbance.   Cardiovascular:  Positive for leg swelling (R > L). Negative for chest pain,  "claudication, cyanosis, dyspnea on exertion (MINIMAL), irregular heartbeat, near-syncope, orthopnea, palpitations, paroxysmal nocturnal dyspnea and syncope.   Respiratory:  Negative for cough, hemoptysis, shortness of breath and wheezing.    Endocrine: Negative for polydipsia and polyuria.   Hematologic/Lymphatic: Negative for adenopathy. Does not bruise/bleed easily.   Skin:  Negative for itching and rash.   Musculoskeletal:  Negative for back pain, falls and joint pain.   Gastrointestinal:  Negative for abdominal pain, change in bowel habit, heartburn, hematemesis, jaundice, melena and nausea.   Genitourinary:  Negative for dysuria and flank pain.   Neurological:  Negative for brief paralysis, focal weakness, light-headedness, loss of balance, paresthesias, tremors and weakness.   Psychiatric/Behavioral:  Negative for altered mental status and depression (S[place]).    Allergic/Immunologic: Negative for hives and persistent infections.      Objective:      Vitals:    12/13/22 1433   BP: 124/63   Pulse: 84   Weight: 67.4 kg (148 lb 9.4 oz)   Height: 4' 11" (1.499 m)   PainSc: 0-No pain     Body mass index is 30.01 kg/m².    Physical Exam  Constitutional:       Appearance: She is well-developed.      Comments: OVERWEIGHT   HENT:      Head: Normocephalic and atraumatic.   Eyes:      General: No scleral icterus.     Extraocular Movements: Extraocular movements intact.   Neck:      Vascular: Normal carotid pulses. No JVD.   Cardiovascular:      Rate and Rhythm: Normal rate and regular rhythm. No extrasystoles are present.     Pulses:           Carotid pulses are 2+ on the right side and 2+ on the left side.       Radial pulses are 2+ on the right side and 2+ on the left side.        Posterior tibial pulses are 2+ on the right side and 2+ on the left side.      Heart sounds: Normal heart sounds.     No friction rub. No gallop.   Pulmonary:      Effort: Pulmonary effort is normal.      Breath sounds: Normal breath sounds. No " decreased breath sounds or rales.   Chest:      Comments: STERNOTOMY SCAR  Abdominal:      Palpations: Abdomen is soft. There is no hepatomegaly or mass.      Tenderness: There is no abdominal tenderness.   Musculoskeletal:         General: Normal range of motion.      Cervical back: Neck supple.      Right lower leg: Edema present.      Left lower leg: Edema (TRACE) present.      Comments: TRACE ANKLE EDEMA LLE, VARICOSE VEINS   Skin:     General: Skin is warm and dry.      Capillary Refill: Capillary refill takes less than 2 seconds.      Findings: No rash.   Neurological:      General: No focal deficit present.      Mental Status: She is alert and oriented to person, place, and time.      Cranial Nerves: No cranial nerve deficit (MILDLY [place]).   Psychiatric:         Mood and Affect: Mood normal.         Speech: Speech normal.         Behavior: Behavior normal.               ..    Chemistry        Component Value Date/Time     (L) 11/01/2022 0845    K 4.4 11/01/2022 0845     11/01/2022 0845    CO2 24 11/01/2022 0845    BUN 25 (H) 11/01/2022 0845    CREATININE 1.3 11/01/2022 0845     (H) 11/01/2022 0845        Component Value Date/Time    CALCIUM 9.3 11/01/2022 0845    ALKPHOS 75 11/01/2022 0845    AST 23 11/01/2022 0845    ALT 17 11/01/2022 0845    BILITOT 0.5 11/01/2022 0845    ESTGFRAFRICA 43.2 (A) 02/24/2022 0918    EGFRNONAA 37.5 (A) 02/24/2022 0918            ..  Lab Results   Component Value Date    CHOL 125 11/01/2022    CHOL 113 (L) 08/16/2021    CHOL 126 07/20/2020     Lab Results   Component Value Date    HDL 41 11/01/2022    HDL 34 (L) 08/16/2021    HDL 34 (L) 07/20/2020     Lab Results   Component Value Date    LDLCALC 65.8 11/01/2022    LDLCALC 51.6 (L) 08/16/2021    LDLCALC 72.0 07/20/2020     Lab Results   Component Value Date    TRIG 91 11/01/2022    TRIG 137 08/16/2021    TRIG 100 07/20/2020     Lab Results   Component Value Date    CHOLHDL 32.8 11/01/2022    CHOLHDL 30.1  08/16/2021    CHOLHDL 27.0 07/20/2020     ..  Lab Results   Component Value Date    WBC 9.33 11/01/2022    HGB 11.3 (L) 11/01/2022    HCT 36.9 (L) 11/01/2022    MCV 97 11/01/2022     11/01/2022       Test(s) Reviewed  I have reviewed the following in detail:  [] Stress test   [] Angiography   [] Echocardiogram   [x] Labs   [] Other:       Assessment:         ICD-10-CM ICD-9-CM   1. Ischemic cardiomyopathy  I25.5 414.8   2. Orthostatic dizziness  R42 780.4   3. Long term (current) use of antithrombotics/antiplatelets  Z79.02 V58.63   4. Hypercholesterolemia  E78.00 272.0   5. Obesity, Class I, BMI 30-34.9  E66.9 278.00   6. Coronary artery disease involving native coronary artery of native heart without angina pectoris  I25.10 414.01     Problem List Items Addressed This Visit          Cardiac/Vascular    Hypercholesterolemia    Relevant Medications    atorvastatin (LIPITOR) 40 MG tablet    Coronary artery disease    Ischemic cardiomyopathy - Primary    Relevant Orders    Echo       Hematology    Long term (current) use of antithrombotics/antiplatelets       Endocrine    Obesity, Class I, BMI 30-34.9       Other    Orthostatic dizziness        Plan:         DECREASE LOSARTAN TO 12.5 NIGHTLY AND WATCH BLOOD PRESSURE, AVOID SUDDEN CHANGE IN POSITION, EXERCISE LEGS SPECIALLY BEFORE MOVING AND GETTING UP,ALL CV CLINICALLY STABLE, NO ANGINA, NO HF, NO TIA, NO CLINICAL ARRHYTHMIA,CONTINUE CURRENT MEDS, EDUCATION, DIET, EXERCISE WEIGHT LOSS RETURN TO CLINIC IN 6 MONTHS WITH ECHO REASSESS EF, DISCUSSED PLAN WITH THE PATIENT AND HER DAUGHTER  Ischemic cardiomyopathy  -     Echo; Future; Expected date: 06/13/2023    Orthostatic dizziness    Long term (current) use of antithrombotics/antiplatelets    Hypercholesterolemia  Comments:  GOOD  Orders:  -     atorvastatin (LIPITOR) 40 MG tablet; Take 1 tablet (40 mg total) by mouth once daily.  Dispense: 90 tablet; Refill: 1    Obesity, Class I, BMI 30-34.9    Coronary artery  disease involving native coronary artery of native heart without angina pectoris    Other orders  -     losartan (COZAAR) 25 MG tablet; Take 0.5 tablets (12.5 mg total) by mouth every evening.  Dispense: 45 tablet; Refill: 1  -     metoprolol succinate (TOPROL-XL) 25 MG 24 hr tablet; Take 0.5 tablets (12.5 mg total) by mouth once daily.  Dispense: 45 tablet; Refill: 1    RTC Low level/low impact aerobic exercise 5x's/wk. Heart healthy diet and risk factor modification.    See labs and med orders.    Aerobic exercise 5x's/wk. Heart healthy diet and risk factor modification.    See labs and med orders.

## 2022-12-27 ENCOUNTER — TELEPHONE (OUTPATIENT)
Dept: FAMILY MEDICINE | Facility: CLINIC | Age: 87
End: 2022-12-27
Payer: MEDICARE

## 2022-12-27 NOTE — TELEPHONE ENCOUNTER
----- Message from Moises Anderson sent at 12/27/2022 11:44 AM CST -----  Contact: pt's son at  [phone]  Type:  Same Day Appointment Request    Caller is requesting a same day appointment.  Caller declined first available appointment listed below.      Name of Caller:  pt's son  When is the first available appointment?  1/10/23  Symptoms:  Extreme pain/inflamation of knee  Best Call Back Number:  [phone]  Additional Information:   Please call back to advise.

## 2022-12-27 NOTE — TELEPHONE ENCOUNTER
Spoke with patients son and he states patient was went to the Emergency room. Advised patients son to schedule a hospital fiollow up.    Patients son states he will call back to schedule a follow up.

## 2022-12-28 ENCOUNTER — TELEPHONE (OUTPATIENT)
Dept: FAMILY MEDICINE | Facility: CLINIC | Age: 87
End: 2022-12-28
Payer: MEDICARE

## 2022-12-28 DIAGNOSIS — M17.10 ARTHRITIS OF KNEE: Primary | ICD-10-CM

## 2022-12-28 NOTE — TELEPHONE ENCOUNTER
----- Message from Jessica Alcidesjoshua sent at 12/28/2022 11:55 AM CST -----  Contact: Alina Daughter  Patient is having issues getting off the commode with her knee problems, so they are requesting you order a raised toilet seat with handles to help accommodate her.  The company is [employer] phone number [phone] and call back to advise at [phone], they really would like to have it this week so please call and let them if this will be possible, and thanks

## 2022-12-28 NOTE — TELEPHONE ENCOUNTER
----- Message from Jessica Alcidesjoshua sent at 12/28/2022 11:55 AM CST -----  Contact: Alina Daughter  Patient is having issues getting off the commode with her knee problems, so they are requesting you order a raised toilet seat with handles to help accommodate her.  The company is [employer] phone number [phone] and call back to advise at [phone], they really would like to have it this week so please call and let them if this will be possible, and thanks

## 2023-02-03 ENCOUNTER — OFFICE VISIT (OUTPATIENT)
Dept: PODIATRY | Facility: CLINIC | Age: 88
End: 2023-02-03
Payer: MEDICARE

## 2023-02-03 DIAGNOSIS — I73.9 PVD (PERIPHERAL VASCULAR DISEASE): Primary | ICD-10-CM

## 2023-02-03 DIAGNOSIS — B35.1 ONYCHOMYCOSIS DUE TO DERMATOPHYTE: ICD-10-CM

## 2023-02-03 PROCEDURE — 1101F PR PT FALLS ASSESS DOC 0-1 FALLS W/OUT INJ PAST YR: ICD-10-PCS | Mod: CPTII,S$GLB,, | Performed by: PODIATRIST

## 2023-02-03 PROCEDURE — 99213 PR OFFICE/OUTPT VISIT, EST, LEVL III, 20-29 MIN: ICD-10-PCS | Mod: 25,S$GLB,, | Performed by: PODIATRIST

## 2023-02-03 PROCEDURE — 99213 OFFICE O/P EST LOW 20 MIN: CPT | Mod: 25,S$GLB,, | Performed by: PODIATRIST

## 2023-02-03 PROCEDURE — 99999 PR PBB SHADOW E&M-EST. PATIENT-LVL I: CPT | Mod: PBBFAC,,, | Performed by: PODIATRIST

## 2023-02-03 PROCEDURE — 11721 PR DEBRIDEMENT OF NAILS, 6 OR MORE: ICD-10-PCS | Mod: Q9,S$GLB,, | Performed by: PODIATRIST

## 2023-02-03 PROCEDURE — 99999 PR PBB SHADOW E&M-EST. PATIENT-LVL I: ICD-10-PCS | Mod: PBBFAC,,, | Performed by: PODIATRIST

## 2023-02-03 PROCEDURE — 1126F AMNT PAIN NOTED NONE PRSNT: CPT | Mod: CPTII,S$GLB,, | Performed by: PODIATRIST

## 2023-02-03 PROCEDURE — 1126F PR PAIN SEVERITY QUANTIFIED, NO PAIN PRESENT: ICD-10-PCS | Mod: CPTII,S$GLB,, | Performed by: PODIATRIST

## 2023-02-03 PROCEDURE — 3288F PR FALLS RISK ASSESSMENT DOCUMENTED: ICD-10-PCS | Mod: CPTII,S$GLB,, | Performed by: PODIATRIST

## 2023-02-03 PROCEDURE — 1101F PT FALLS ASSESS-DOCD LE1/YR: CPT | Mod: CPTII,S$GLB,, | Performed by: PODIATRIST

## 2023-02-03 PROCEDURE — 3288F FALL RISK ASSESSMENT DOCD: CPT | Mod: CPTII,S$GLB,, | Performed by: PODIATRIST

## 2023-02-03 PROCEDURE — 11721 DEBRIDE NAIL 6 OR MORE: CPT | Mod: Q9,S$GLB,, | Performed by: PODIATRIST

## 2023-02-03 NOTE — PROGRESS NOTES
Subjective:      Patient ID: Dolores B LeJeune is a 87 y.o. female.    Chief Complaint: Diabetes Mellitus and Nail Care      Kelly is a 87 y.o. female witha past medical history of Acute MI, Anticoagulant long-term use, Carpal tunnel syndrome, Cataracts, bilateral, Colon polyps, Coronary artery disease, DM type 2 (diabetes mellitus, type 2), HTN (hypertension), Hyperlipidemia, Osteoarthritis, and Peripheral neuropathy. The patient's chief complaint consists of toenails that are in need of trimming.  Denies experiencing pain from the nails with today's exam.  Has not attempted to self treat.  Denies symptoms of claudication or rest pain.  Denies any additional pedal complaints.      PCP: Silvia Soto MD  Last visit:   Past Medical History:   Diagnosis Date    Acute MI     Anticoagulant long-term use     Carpal tunnel syndrome     Cataracts, bilateral     Colon polyps     Coronary artery disease     DM type 2 (diabetes mellitus, type 2)     HTN (hypertension)     Hyperlipidemia     Osteoarthritis     Peripheral neuropathy        Past Surgical History:   Procedure Laterality Date    CAROTID STENT      1    COLONOSCOPY  2008  [facility]    A 3 mm by 6 mm polyp in the cecum.  FRAGMENTS OF TUBULAR ADENOMA.    A 1 mm polyp in the recto-sigmoid colon.  HYPERPLASTIC POLYP.    Extremely redundant colon.      CORONARY ARTERY BYPASS GRAFT  2018    [facility], Dr. Echols. LIMA to LAD, SVG to OM1, SVG to OM2, SVG to PDA    EYE SURGERY      eye surgery    HYSTERECTOMY      ovaries remain    ptyregium      TONSILLECTOMY         Family History   Problem Relation Age of Onset    Tremor Mother     Hypertension Mother     Heart disease Mother     Stroke Father        Social History     Socioeconomic History    Marital status:     Number of children: 4   Tobacco Use    Smoking status: Former     Types: Cigarettes     Quit date: 3/15/1972     Years since quittin.9    Smokeless tobacco: Never   Substance and Sexual  Activity    Alcohol use: No    Drug use: No       Current Outpatient Medications   Medication Sig Dispense Refill    ACCU-CHEK SOFTCLIX LANCETS [facility] USE ONE AS DIRECTED TWICE DAILY 200 each 10    aspirin (ECOTRIN) 81 MG EC tablet Take 81 mg by mouth once daily.      atorvastatin (LIPITOR) 40 MG tablet Take 1 tablet (40 mg total) by mouth once daily. 90 tablet 1    biotin 5,000 mcg TbDL Take by mouth.      CALCIUM CARBONATE/VITAMIN D3 (CALCIUM 500 WITH D ORAL) Take by mouth.      ciclopirox (PENLAC) 8 % Soln Apply topically nightly. 6.6 mL 11    clopidogreL (PLAVIX) 75 mg tablet TAKE  ONE TABLET BY MOUTH DAILY 90 tablet 1    DEXTRAN 70/HYPROMELLOSE (ARTIFICIAL TEARS OPHT) Apply to eye.      gabapentin (NEURONTIN) 300 MG capsule TAKE ONE CAPSULE BY MOUTH DAILY 90 capsule 3    HYDROcodone-acetaminophen (NORCO) 5-325 mg per tablet Take 1 tablet by mouth every 6 (six) hours as needed for Pain. 20 tablet 0    LIDOcaine (LIDODERM) 5 % Place 1 patch onto the skin once daily. Leave patch on for 12 hours then remove and do not place another patch to this area for at least 12 hours. 15 patch 0    losartan (COZAAR) 25 MG tablet Take 0.5 tablets (12.5 mg total) by mouth every evening. 45 tablet 1    meclizine (ANTIVERT) 25 mg tablet Take 1 tablet by mouth Three times daily as needed.      metFORMIN (GLUCOPHAGE) 500 MG tablet TAKE 1/2 TABLET BY MOUTH TWICE DAILY WITH FOOD 90 tablet 1    metoprolol succinate (TOPROL-XL) 25 MG 24 hr tablet Take 0.5 tablets (12.5 mg total) by mouth once daily. 45 tablet 1    MYRBETRIQ 50 mg Tb24 TAKE ONE TABLET BY MOUTH DAILY 90 tablet 1    ONETOUCH DELICA LANCETS 33 gauge Misc       ONETOUCH VERIO TEST STRIPS Strp USE ONE STRIP FOR TESTING TWICE DAILY 200 each 3    RUTIN/HESP/BIOFLAV/C/[id number] (BIOFLEX ORAL) Take 1 tablet by mouth once daily at 6am.      pantoprazole (PROTONIX) 40 MG tablet Take 1 tablet (40 mg total) by mouth once daily. (Patient taking differently: Take 40 mg by mouth as  needed.) 90 tablet 1     No current facility-administered medications for this visit.       Review of patient's allergies indicates:   Allergen Reactions    Albuterol Other (See Comments)     Palpitations/tremor      Sulfa (sulfonamide antibiotics) Rash       Hemoglobin A1C   Date Value Ref Range Status   11/01/2022 7.3 (H) 4.0 - 5.6 % Final     Comment:     ADA Screening Guidelines:  5.7-6.4%  Consistent with prediabetes  >or=6.5%  Consistent with diabetes    High levels of fetal hemoglobin interfere with the HbA1C  assay. Heterozygous hemoglobin variants (HbS, HgC, etc)do  not significantly interfere with this assay.   However, presence of multiple variants may affect accuracy.     02/24/2022 7.1 (H) 4.0 - 5.6 % Final     Comment:     ADA Screening Guidelines:  5.7-6.4%  Consistent with prediabetes  >or=6.5%  Consistent with diabetes    High levels of fetal hemoglobin interfere with the HbA1C  assay. Heterozygous hemoglobin variants (HbS, HgC, etc)do  not significantly interfere with this assay.   However, presence of multiple variants may affect accuracy.     08/09/2021 6.5 (H) 4.0 - 5.6 % Final     Comment:     ADA Screening Guidelines:  5.7-6.4%  Consistent with prediabetes  >or=6.5%  Consistent with diabetes    High levels of fetal hemoglobin interfere with the HbA1C  assay. Heterozygous hemoglobin variants (HbS, HgC, etc)do  not significantly interfere with this assay.   However, presence of multiple variants may affect accuracy.         Review of Systems   Constitutional: Negative for chills and fever.   Cardiovascular:  Negative for claudication and leg swelling.   Skin:  Positive for color change and nail changes.   Musculoskeletal:  Negative for joint pain, joint swelling, muscle cramps and muscle weakness.   Gastrointestinal:  Negative for nausea and vomiting.   Neurological:  Negative for numbness and paresthesias.   Psychiatric/Behavioral:  Negative for altered mental status.          Objective:       Physical Exam  Constitutional:       Appearance: Normal appearance. She is not ill-appearing.   Cardiovascular:      Pulses:           Dorsalis pedis pulses are 1+ on the right side and 1+ on the left side.        Posterior tibial pulses are 1+ on the right side and 1+ on the left side.      Comments: CFT is 3 seconds bilateral.  Pedal hair growth is absent bilateral.  Varicosities noted bilateral.  Mild to moderate non pitting lower extremity edema noted bilateral.  Toes are cool to touch bilateral.    Musculoskeletal:         General: No tenderness or signs of injury.      Comments: Muscle strength 5/5 in all muscle groups bilateral.  No tenderness nor crepitation with ROM of foot/ankle joints bilateral.  (-) for pain with palpation of the medial and lateral borders of bilateral hallux toenail.    Skin:     General: Skin is warm and dry.      Capillary Refill: Capillary refill takes more than 3 seconds.      Findings: No bruising, ecchymosis, erythema, signs of injury, laceration, lesion, petechiae, rash or wound.      Comments: Increased pedal turgor noted bilateral.  Toenails x 10 appear thickened by 2 mm, elongated by 4 mm, and discolored with subungual debris.  No localized sign of bacterial infection noted.    Neurological:      General: No focal deficit present.      Mental Status: She is alert.      Sensory: No sensory deficit.      Motor: No weakness or atrophy.      Comments: Light touch is intact bilateral.               Assessment:       Encounter Diagnoses   Name Primary?    PVD (peripheral vascular disease) Yes    Onychomycosis due to dermatophyte          Plan:       Kelly was seen today for diabetes mellitus and nail care.    Diagnoses and all orders for this visit:    PVD (peripheral vascular disease)    Onychomycosis due to dermatophyte    I counseled the patient on her conditions, their implications and medical management.    PVD remains stable with today's exam.    Patient instructed on  proper foot hygeine. We discussed wearing proper shoe gear, daily foot inspections, never walking without protective shoe gear, never putting sharp instruments to feet    With patient's permission, nails were aggressively reduced and debrided x 10 to their soft tissue attachment mechanically and with electric , removing all offending nail and debris. Patient relates relief following the procedure. She will continue to monitor the areas daily, inspect her feet, wear protective shoe gear when ambulatory, moisturizer to maintain skin integrity and follow in this office in approximately 3 months, sooner p.r.n.    RTC for routine high risk exam in three months.      Lucian Fernandez DPM

## 2023-04-16 ENCOUNTER — PATIENT MESSAGE (OUTPATIENT)
Dept: FAMILY MEDICINE | Facility: CLINIC | Age: 88
End: 2023-04-16
Payer: MEDICARE

## 2023-04-16 DIAGNOSIS — E11.59 HYPERTENSION ASSOCIATED WITH DIABETES: Primary | ICD-10-CM

## 2023-04-16 DIAGNOSIS — I15.2 HYPERTENSION ASSOCIATED WITH DIABETES: Primary | ICD-10-CM

## 2023-04-16 DIAGNOSIS — N18.32 STAGE 3B CHRONIC KIDNEY DISEASE: ICD-10-CM

## 2023-04-16 DIAGNOSIS — E11.21 TYPE 2 DIABETES MELLITUS WITH DIABETIC NEPHROPATHY, WITHOUT LONG-TERM CURRENT USE OF INSULIN: ICD-10-CM

## 2023-05-02 ENCOUNTER — LAB VISIT (OUTPATIENT)
Dept: LAB | Facility: HOSPITAL | Age: 88
End: 2023-05-02
Attending: FAMILY MEDICINE
Payer: MEDICARE

## 2023-05-02 DIAGNOSIS — N18.32 STAGE 3B CHRONIC KIDNEY DISEASE: ICD-10-CM

## 2023-05-02 DIAGNOSIS — E11.59 HYPERTENSION ASSOCIATED WITH DIABETES: ICD-10-CM

## 2023-05-02 DIAGNOSIS — I15.2 HYPERTENSION ASSOCIATED WITH DIABETES: ICD-10-CM

## 2023-05-02 DIAGNOSIS — E11.21 TYPE 2 DIABETES MELLITUS WITH DIABETIC NEPHROPATHY, WITHOUT LONG-TERM CURRENT USE OF INSULIN: ICD-10-CM

## 2023-05-02 LAB
ALBUMIN SERPL BCP-MCNC: 3.6 G/DL (ref 3.5–5.2)
ALP SERPL-CCNC: 73 U/L (ref 55–135)
ALT SERPL W/O P-5'-P-CCNC: 16 U/L (ref 10–44)
ANION GAP SERPL CALC-SCNC: 7 MMOL/L (ref 8–16)
AST SERPL-CCNC: 20 U/L (ref 10–40)
BASOPHILS # BLD AUTO: 0.02 K/UL (ref 0–0.2)
BASOPHILS NFR BLD: 0.2 % (ref 0–1.9)
BILIRUB SERPL-MCNC: 0.4 MG/DL (ref 0.1–1)
BUN SERPL-MCNC: 18 MG/DL (ref 8–23)
CALCIUM SERPL-MCNC: 9.3 MG/DL (ref 8.7–10.5)
CHLORIDE SERPL-SCNC: 102 MMOL/L (ref 95–110)
CO2 SERPL-SCNC: 31 MMOL/L (ref 23–29)
CREAT SERPL-MCNC: 1.2 MG/DL (ref 0.5–1.4)
DIFFERENTIAL METHOD: ABNORMAL
EOSINOPHIL # BLD AUTO: 0.2 K/UL (ref 0–0.5)
EOSINOPHIL NFR BLD: 2.5 % (ref 0–8)
ERYTHROCYTE [DISTWIDTH] IN BLOOD BY AUTOMATED COUNT: 13.2 % (ref 11.5–14.5)
EST. GFR  (NO RACE VARIABLE): 44.1 ML/MIN/1.73 M^2
ESTIMATED AVG GLUCOSE: 166 MG/DL (ref 68–131)
GLUCOSE SERPL-MCNC: 138 MG/DL (ref 70–110)
HBA1C MFR BLD: 7.4 % (ref 4–5.6)
HCT VFR BLD AUTO: 35 % (ref 37–48.5)
HGB BLD-MCNC: 10.8 G/DL (ref 12–16)
IMM GRANULOCYTES # BLD AUTO: 0.02 K/UL (ref 0–0.04)
IMM GRANULOCYTES NFR BLD AUTO: 0.2 % (ref 0–0.5)
LYMPHOCYTES # BLD AUTO: 1.7 K/UL (ref 1–4.8)
LYMPHOCYTES NFR BLD: 21.3 % (ref 18–48)
MCH RBC QN AUTO: 30.1 PG (ref 27–31)
MCHC RBC AUTO-ENTMCNC: 30.9 G/DL (ref 32–36)
MCV RBC AUTO: 98 FL (ref 82–98)
MONOCYTES # BLD AUTO: 0.5 K/UL (ref 0.3–1)
MONOCYTES NFR BLD: 6.3 % (ref 4–15)
NEUTROPHILS # BLD AUTO: 5.6 K/UL (ref 1.8–7.7)
NEUTROPHILS NFR BLD: 69.5 % (ref 38–73)
NRBC BLD-RTO: 0 /100 WBC
PLATELET # BLD AUTO: 280 K/UL (ref 150–450)
PMV BLD AUTO: 10.9 FL (ref 9.2–12.9)
POTASSIUM SERPL-SCNC: 4.5 MMOL/L (ref 3.5–5.1)
PROT SERPL-MCNC: 6.4 G/DL (ref 6–8.4)
RBC # BLD AUTO: 3.59 M/UL (ref 4–5.4)
SODIUM SERPL-SCNC: 140 MMOL/L (ref 136–145)
WBC # BLD AUTO: 8.07 K/UL (ref 3.9–12.7)

## 2023-05-02 PROCEDURE — 83036 HEMOGLOBIN GLYCOSYLATED A1C: CPT | Performed by: FAMILY MEDICINE

## 2023-05-02 PROCEDURE — 85025 COMPLETE CBC W/AUTO DIFF WBC: CPT | Performed by: FAMILY MEDICINE

## 2023-05-02 PROCEDURE — 36415 COLL VENOUS BLD VENIPUNCTURE: CPT | Mod: PO | Performed by: FAMILY MEDICINE

## 2023-05-02 PROCEDURE — 80053 COMPREHEN METABOLIC PANEL: CPT | Performed by: FAMILY MEDICINE

## 2023-05-09 ENCOUNTER — OFFICE VISIT (OUTPATIENT)
Dept: PODIATRY | Facility: CLINIC | Age: 88
End: 2023-05-09
Payer: MEDICARE

## 2023-05-09 VITALS — BODY MASS INDEX: 29.24 KG/M2 | WEIGHT: 145.06 LBS | HEIGHT: 59 IN

## 2023-05-09 DIAGNOSIS — I73.9 PVD (PERIPHERAL VASCULAR DISEASE): Primary | ICD-10-CM

## 2023-05-09 DIAGNOSIS — B35.1 ONYCHOMYCOSIS DUE TO DERMATOPHYTE: ICD-10-CM

## 2023-05-09 DIAGNOSIS — I87.2 VENOUS INSUFFICIENCY (CHRONIC) (PERIPHERAL): ICD-10-CM

## 2023-05-09 PROCEDURE — 99999 PR PBB SHADOW E&M-EST. PATIENT-LVL II: ICD-10-PCS | Mod: PBBFAC,,, | Performed by: PODIATRIST

## 2023-05-09 PROCEDURE — 11721 DEBRIDE NAIL 6 OR MORE: CPT | Mod: Q9,S$GLB,, | Performed by: PODIATRIST

## 2023-05-09 PROCEDURE — 11721 PR DEBRIDEMENT OF NAILS, 6 OR MORE: ICD-10-PCS | Mod: Q9,S$GLB,, | Performed by: PODIATRIST

## 2023-05-09 PROCEDURE — 99999 PR PBB SHADOW E&M-EST. PATIENT-LVL II: CPT | Mod: PBBFAC,,, | Performed by: PODIATRIST

## 2023-05-09 PROCEDURE — 99499 NO LOS: ICD-10-PCS | Mod: S$GLB,,, | Performed by: PODIATRIST

## 2023-05-09 PROCEDURE — 99499 UNLISTED E&M SERVICE: CPT | Mod: S$GLB,,, | Performed by: PODIATRIST

## 2023-05-09 NOTE — PROGRESS NOTES
Subjective:      Patient ID: Dolores B LeJeune is a 87 y.o. female.    Chief Complaint: Peripheral Vascular Disease ([facility] )      Kelly is a 87 y.o. female witha past medical history of Acute MI, Anticoagulant long-term use, Carpal tunnel syndrome, Cataracts, bilateral, Colon polyps, Coronary artery disease, DM type 2 (diabetes mellitus, type 2), HTN (hypertension), Hyperlipidemia, Osteoarthritis, and Peripheral neuropathy. The patient's chief complaint consists of toenails that are in need of trimming.  Denies experiencing pain from the nails with today's exam.  Has not attempted to self treat.  Denies symptoms of claudication or rest pain.  Denies any additional pedal complaints.      PCP: Silvia Soto MD  Last visit:   Past Medical History:   Diagnosis Date    Acute MI     Anticoagulant long-term use     Carpal tunnel syndrome     Cataracts, bilateral     Colon polyps     Coronary artery disease     DM type 2 (diabetes mellitus, type 2)     HTN (hypertension)     Hyperlipidemia     Osteoarthritis     Peripheral neuropathy        Past Surgical History:   Procedure Laterality Date    CAROTID STENT      1    COLONOSCOPY  2008  [facility]    A 3 mm by 6 mm polyp in the cecum.  FRAGMENTS OF TUBULAR ADENOMA.    A 1 mm polyp in the recto-sigmoid colon.  HYPERPLASTIC POLYP.    Extremely redundant colon.      CORONARY ARTERY BYPASS GRAFT  2018    [facility], Dr. Echols. LIMA to LAD, SVG to OM1, SVG to OM2, SVG to PDA    EYE SURGERY      eye surgery    HYSTERECTOMY      ovaries remain    ptyregium      TONSILLECTOMY         Family History   Problem Relation Age of Onset    Tremor Mother     Hypertension Mother     Heart disease Mother     Stroke Father        Social History     Socioeconomic History    Marital status:     Number of children: 4   Tobacco Use    Smoking status: Former     Types: Cigarettes     Quit date: 3/15/1972     Years since quittin.1    Smokeless tobacco: Never   Substance and  Sexual Activity    Alcohol use: No    Drug use: No       Current Outpatient Medications   Medication Sig Dispense Refill    ACCU-CHEK SOFTCLIX LANCETS [facility] USE ONE AS DIRECTED TWICE DAILY 200 each 10    aspirin (ECOTRIN) 81 MG EC tablet Take 81 mg by mouth once daily.      atorvastatin (LIPITOR) 40 MG tablet Take 1 tablet (40 mg total) by mouth once daily. 90 tablet 1    biotin 5,000 mcg TbDL Take by mouth.      CALCIUM CARBONATE/VITAMIN D3 (CALCIUM 500 WITH D ORAL) Take by mouth.      ciclopirox (PENLAC) 8 % Soln Apply topically nightly. 6.6 mL 11    clopidogreL (PLAVIX) 75 mg tablet TAKE ONE TABLET BY MOUTH DAILY 90 tablet 1    DEXTRAN 70/HYPROMELLOSE (ARTIFICIAL TEARS OPHT) Apply to eye.      gabapentin (NEURONTIN) 300 MG capsule TAKE ONE CAPSULE BY MOUTH DAILY 90 capsule 3    HYDROcodone-acetaminophen (NORCO) 5-325 mg per tablet Take 1 tablet by mouth every 6 (six) hours as needed for Pain. 20 tablet 0    LIDOcaine (LIDODERM) 5 % Place 1 patch onto the skin once daily. Leave patch on for 12 hours then remove and do not place another patch to this area for at least 12 hours. 15 patch 0    losartan (COZAAR) 25 MG tablet Take 0.5 tablets (12.5 mg total) by mouth every evening. 45 tablet 1    meclizine (ANTIVERT) 25 mg tablet Take 1 tablet by mouth Three times daily as needed.      metFORMIN (GLUCOPHAGE) 500 MG tablet TAKE 1/2 TABLET BY MOUTH TWICE DAILY WITH FOOD 90 tablet 1    metoprolol succinate (TOPROL-XL) 25 MG 24 hr tablet Take 0.5 tablets (12.5 mg total) by mouth once daily. 45 tablet 1    MYRBETRIQ 50 mg Tb24 TAKE ONE TABLET BY MOUTH DAILY 90 tablet 1    ONETOUCH DELICA LANCETS 33 gauge Misc       ONETOUCH VERIO TEST STRIPS Strp USE ONE STRIP FOR TESTING TWICE DAILY 200 each 3    RUTIN/HESP/BIOFLAV/C/[id number] (BIOFLEX ORAL) Take 1 tablet by mouth once daily at 6am.      pantoprazole (PROTONIX) 40 MG tablet Take 1 tablet (40 mg total) by mouth once daily. (Patient taking differently: Take 40 mg by mouth as  needed.) 90 tablet 1     No current facility-administered medications for this visit.       Review of patient's allergies indicates:   Allergen Reactions    Albuterol Other (See Comments)     Palpitations/tremor      Sulfa (sulfonamide antibiotics) Rash       Hemoglobin A1C   Date Value Ref Range Status   05/02/2023 7.4 (H) 4.0 - 5.6 % Final     Comment:     ADA Screening Guidelines:  5.7-6.4%  Consistent with prediabetes  >or=6.5%  Consistent with diabetes    High levels of fetal hemoglobin interfere with the HbA1C  assay. Heterozygous hemoglobin variants (HbS, HgC, etc)do  not significantly interfere with this assay.   However, presence of multiple variants may affect accuracy.     11/01/2022 7.3 (H) 4.0 - 5.6 % Final     Comment:     ADA Screening Guidelines:  5.7-6.4%  Consistent with prediabetes  >or=6.5%  Consistent with diabetes    High levels of fetal hemoglobin interfere with the HbA1C  assay. Heterozygous hemoglobin variants (HbS, HgC, etc)do  not significantly interfere with this assay.   However, presence of multiple variants may affect accuracy.     02/24/2022 7.1 (H) 4.0 - 5.6 % Final     Comment:     ADA Screening Guidelines:  5.7-6.4%  Consistent with prediabetes  >or=6.5%  Consistent with diabetes    High levels of fetal hemoglobin interfere with the HbA1C  assay. Heterozygous hemoglobin variants (HbS, HgC, etc)do  not significantly interfere with this assay.   However, presence of multiple variants may affect accuracy.         Review of Systems   Constitutional: Negative for chills and fever.   Cardiovascular:  Positive for leg swelling. Negative for claudication.   Skin:  Positive for color change and nail changes.   Musculoskeletal:  Positive for joint swelling. Negative for joint pain, muscle cramps and muscle weakness.   Gastrointestinal:  Negative for nausea and vomiting.   Neurological:  Negative for numbness and paresthesias.   Psychiatric/Behavioral:  Negative for altered mental status.           Objective:      Physical Exam  Constitutional:       Appearance: Normal appearance. She is not ill-appearing.   Cardiovascular:      Pulses:           Dorsalis pedis pulses are 1+ on the right side and 1+ on the left side.        Posterior tibial pulses are 1+ on the right side and 1+ on the left side.      Comments: CFT is 3 seconds bilateral.  Pedal hair growth is absent bilateral.  Varicosities noted bilateral.  Moderate non pitting lower extremity edema noted bilateral.  Toes are cool to touch bilateral.    Musculoskeletal:         General: Swelling present. No tenderness or signs of injury.      Comments: Muscle strength 5/5 in all muscle groups bilateral.  No tenderness nor crepitation with ROM of foot/ankle joints bilateral.  (-) for pain with palpation of the medial and lateral borders of bilateral hallux toenail.    Skin:     General: Skin is warm and dry.      Capillary Refill: Capillary refill takes more than 3 seconds.      Findings: No bruising, ecchymosis, erythema, signs of injury, laceration, lesion, petechiae, rash or wound.      Comments: Increased pedal turgor noted bilateral.  Toenails x 10 appear thickened by 2 mm, elongated by 4 mm, and discolored with subungual debris.  No localized sign of bacterial infection noted.    Neurological:      General: No focal deficit present.      Mental Status: She is alert.      Sensory: No sensory deficit.      Motor: No weakness or atrophy.      Comments: Light touch is intact bilateral.               Assessment:       Encounter Diagnoses   Name Primary?    PVD (peripheral vascular disease) Yes    Onychomycosis due to dermatophyte     Venous insufficiency (chronic) (peripheral)            Plan:       Kelly was seen today for peripheral vascular disease.    Diagnoses and all orders for this visit:    PVD (peripheral vascular disease)    Onychomycosis due to dermatophyte    Venous insufficiency (chronic) (peripheral)        I counseled the patient on  her conditions, their implications and medical management.    PVD remains stable with today's exam.    Advised to inspect the LE's daily for signs of stasis dermatitis and venous ulceration.      Patient instructed on proper foot hygeine. We discussed wearing proper shoe gear, daily foot inspections, never walking without protective shoe gear, never putting sharp instruments to feet    With patient's permission, nails were aggressively reduced and debrided x 10 to their soft tissue attachment mechanically and with electric , removing all offending nail and debris. Patient relates relief following the procedure. She will continue to monitor the areas daily, inspect her feet, wear protective shoe gear when ambulatory, moisturizer to maintain skin integrity and follow in this office in approximately 3 months, sooner p.r.n.    RTC for routine high risk exam in three months.      Luican Fernandez DPM

## 2023-05-11 ENCOUNTER — OFFICE VISIT (OUTPATIENT)
Dept: FAMILY MEDICINE | Facility: CLINIC | Age: 88
End: 2023-05-11
Payer: MEDICARE

## 2023-05-11 ENCOUNTER — PATIENT MESSAGE (OUTPATIENT)
Dept: CARDIOLOGY | Facility: CLINIC | Age: 88
End: 2023-05-11
Payer: MEDICARE

## 2023-05-11 VITALS
BODY MASS INDEX: 30.14 KG/M2 | DIASTOLIC BLOOD PRESSURE: 60 MMHG | HEIGHT: 59 IN | OXYGEN SATURATION: 98 % | HEART RATE: 81 BPM | SYSTOLIC BLOOD PRESSURE: 118 MMHG | WEIGHT: 149.5 LBS

## 2023-05-11 DIAGNOSIS — E66.9 OBESITY, CLASS I, BMI 30-34.9: ICD-10-CM

## 2023-05-11 DIAGNOSIS — E11.69 TYPE 2 DIABETES MELLITUS WITH HYPERLIPIDEMIA: ICD-10-CM

## 2023-05-11 DIAGNOSIS — E78.5 TYPE 2 DIABETES MELLITUS WITH HYPERLIPIDEMIA: ICD-10-CM

## 2023-05-11 DIAGNOSIS — N18.32 STAGE 3B CHRONIC KIDNEY DISEASE: ICD-10-CM

## 2023-05-11 DIAGNOSIS — R14.3 EXCESSIVE FLATUS: ICD-10-CM

## 2023-05-11 DIAGNOSIS — E11.21 TYPE 2 DIABETES MELLITUS WITH DIABETIC NEPHROPATHY, WITHOUT LONG-TERM CURRENT USE OF INSULIN: ICD-10-CM

## 2023-05-11 DIAGNOSIS — I25.10 CORONARY ARTERY DISEASE INVOLVING NATIVE CORONARY ARTERY OF NATIVE HEART WITHOUT ANGINA PECTORIS: ICD-10-CM

## 2023-05-11 DIAGNOSIS — I15.2 HYPERTENSION ASSOCIATED WITH DIABETES: Primary | ICD-10-CM

## 2023-05-11 DIAGNOSIS — E11.59 HYPERTENSION ASSOCIATED WITH DIABETES: Primary | ICD-10-CM

## 2023-05-11 DIAGNOSIS — Z12.31 ENCOUNTER FOR SCREENING MAMMOGRAM FOR MALIGNANT NEOPLASM OF BREAST: ICD-10-CM

## 2023-05-11 DIAGNOSIS — I51.89 DIASTOLIC DYSFUNCTION: ICD-10-CM

## 2023-05-11 PROCEDURE — 99214 OFFICE O/P EST MOD 30 MIN: CPT | Mod: S$GLB,,, | Performed by: FAMILY MEDICINE

## 2023-05-11 PROCEDURE — 1126F PR PAIN SEVERITY QUANTIFIED, NO PAIN PRESENT: ICD-10-PCS | Mod: CPTII,S$GLB,, | Performed by: FAMILY MEDICINE

## 2023-05-11 PROCEDURE — 99999 PR PBB SHADOW E&M-EST. PATIENT-LVL V: ICD-10-PCS | Mod: PBBFAC,,, | Performed by: FAMILY MEDICINE

## 2023-05-11 PROCEDURE — 99214 PR OFFICE/OUTPT VISIT, EST, LEVL IV, 30-39 MIN: ICD-10-PCS | Mod: S$GLB,,, | Performed by: FAMILY MEDICINE

## 2023-05-11 PROCEDURE — 99999 PR PBB SHADOW E&M-EST. PATIENT-LVL V: CPT | Mod: PBBFAC,,, | Performed by: FAMILY MEDICINE

## 2023-05-11 PROCEDURE — 1126F AMNT PAIN NOTED NONE PRSNT: CPT | Mod: CPTII,S$GLB,, | Performed by: FAMILY MEDICINE

## 2023-05-11 NOTE — PROGRESS NOTES
Subjective:       Patient ID: Dolores B LeJeune is a 87 y.o. female.    Chief Complaint: Follow-up    Pt is known to me.  Pt is here for followup of chronic medical issues.  She reports increased flatus recently.  She also belches frequently as well.  She has no abd pain.  She has tried nothing for this yet.  She is having some cramps in her fingers and toes sometimes.  She has cut down some on her fluid intake to avoid having going to the bathroom so much.  The pt reports getting tired recently--she gets up very early and goes to sleep by 10 pm.    If she naps she cannot sleep at night.  This is a long standing issue.  We reviewed her recent labs.  Discussed health maintenance with pt and will schedule appropriate studies and/or immunizations.     Follow-up  Associated symptoms include arthralgias and myalgias. Pertinent negatives include no abdominal pain, chest pain, coughing, fatigue, headaches, nausea, numbness, rash or vomiting. Review of Systems   Constitutional:  Negative for activity change, appetite change, fatigue and unexpected weight change.   Eyes:  Negative for visual disturbance.   Respiratory:  Negative for cough, chest tightness and shortness of breath.    Cardiovascular:  Negative for chest pain, palpitations and leg swelling.   Gastrointestinal:  Positive for abdominal distention. Negative for abdominal pain, constipation, diarrhea, nausea and vomiting.        Gas   Endocrine: Negative for cold intolerance, heat intolerance and polyuria.   Genitourinary:  Positive for enuresis (when she holds her urine). Negative for decreased urine volume, dysuria and urgency.   Musculoskeletal:  Positive for arthralgias and myalgias. Negative for back pain.   Skin:  Negative for rash.   Neurological:  Negative for numbness and headaches.   Psychiatric/Behavioral:  Negative for confusion and sleep disturbance.      Objective:      Physical Exam  Vitals and nursing note reviewed.   Constitutional:        Appearance: She is well-developed.   HENT:      Head: Normocephalic.   Eyes:      Conjunctiva/sclera: Conjunctivae normal.      Pupils: Pupils are equal, round, and reactive to light.   Neck:      Thyroid: No thyromegaly.   Cardiovascular:      Rate and Rhythm: Normal rate and regular rhythm.      Pulses:           Dorsalis pedis pulses are 2+ on the right side and 2+ on the left side.        Posterior tibial pulses are 2+ on the right side and 2+ on the left side.      Heart sounds: Normal heart sounds.   Pulmonary:      Effort: Pulmonary effort is normal.      Breath sounds: Normal breath sounds.   Abdominal:      General: Bowel sounds are normal.      Palpations: Abdomen is soft.      Tenderness: There is no abdominal tenderness.   Musculoskeletal:         General: No tenderness or deformity. Normal range of motion.      Cervical back: Normal range of motion and neck supple.      Right lower leg: Edema (mild) present.      Left lower leg: Edema (mild) present.      Right foot: Normal range of motion. No deformity.      Left foot: Normal range of motion. No deformity.   Feet:      Right foot:      Protective Sensation: 7 sites tested.  7 sites sensed.      Skin integrity: No ulcer.      Left foot:      Protective Sensation: 7 sites tested.  7 sites sensed.      Skin integrity: No ulcer.   Lymphadenopathy:      Cervical: No cervical adenopathy.   Skin:     General: Skin is warm and dry.   Neurological:      Mental Status: She is alert and oriented to person, place, and time.      Cranial Nerves: No cranial nerve deficit.      Motor: No abnormal muscle tone.      Coordination: Coordination normal.      Deep Tendon Reflexes: Reflexes normal.   Psychiatric:         Behavior: Behavior normal.       Assessment:       1. Hypertension associated with diabetes    2. Coronary artery disease involving native coronary artery of native heart without angina pectoris    3. Encounter for screening mammogram for malignant neoplasm  of breast    4. Diastolic dysfunction    5. Stage 3b chronic kidney disease    6. Type 2 diabetes mellitus with hyperlipidemia    7. Type 2 diabetes mellitus with diabetic nephropathy, without long-term current use of insulin    8. Obesity, Class I, BMI 30-34.9    9. Excessive flatus        Plan:       Kelly was seen today for follow-up.    Diagnoses and all orders for this visit:    Hypertension associated with diabetes    Coronary artery disease involving native coronary artery of native heart without angina pectoris    Encounter for screening mammogram for malignant neoplasm of breast  -     Mammo Digital Screening Bilat w/ Sd; Future    Diastolic dysfunction    Stage 3b chronic kidney disease    Type 2 diabetes mellitus with hyperlipidemia    Type 2 diabetes mellitus with diabetic nephropathy, without long-term current use of insulin    Obesity, Class I, BMI 30-34.9    Excessive flatus  Comments:  Pt to start with simethicone prn excessive gas.      During this visit, I reviewed the pt's history, medications, allergies, and problem list.

## 2023-06-01 DIAGNOSIS — E78.5 TYPE 2 DIABETES MELLITUS WITH HYPERLIPIDEMIA: ICD-10-CM

## 2023-06-01 DIAGNOSIS — E11.69 TYPE 2 DIABETES MELLITUS WITH HYPERLIPIDEMIA: ICD-10-CM

## 2023-06-01 DIAGNOSIS — I15.2 HYPERTENSION ASSOCIATED WITH DIABETES: ICD-10-CM

## 2023-06-01 DIAGNOSIS — E11.59 HYPERTENSION ASSOCIATED WITH DIABETES: ICD-10-CM

## 2023-06-01 NOTE — TELEPHONE ENCOUNTER
No care due was identified.  [facility] Embedded Care Due Messages. Reference number: [id number].   6/01/2023 11:01:35 AM CDT

## 2023-06-01 NOTE — TELEPHONE ENCOUNTER
Refill Routing Note   Medication(s) are not appropriate for processing by [facility] for the following reason(s):      Required labs abnormal    [facility] action(s):  Defer None identified        Pharmacist review requested: Yes     Appointments  past 12m or future 3m with PCP    Date Provider   Last Visit   5/11/2023 BEBO Soto MD   Next Visit   Visit date not found BEBO Soto MD   ED visits in past 90 days: 0        Note composed:11:17 AM 06/01/2023

## 2023-06-01 NOTE — TELEPHONE ENCOUNTER
Refill Routing Note   Medication(s) are not appropriate for processing by [facility] for the following reason(s):      Required labs abnormal    [facility] action(s):  Defer None identified            Appointments  past 12m or future 3m with PCP    Date Provider   Last Visit   5/11/2023 BEBO Soto MD   Next Visit   Visit date not found EBBO Soto MD   ED visits in past 90 days: 0        Note composed:3:08 PM 06/01/2023

## 2023-06-05 ENCOUNTER — CLINICAL SUPPORT (OUTPATIENT)
Dept: CARDIOLOGY | Facility: HOSPITAL | Age: 88
End: 2023-06-05
Attending: INTERNAL MEDICINE
Payer: MEDICARE

## 2023-06-05 VITALS — HEIGHT: 59 IN | BODY MASS INDEX: 30.04 KG/M2 | HEART RATE: 76 BPM | WEIGHT: 149 LBS

## 2023-06-05 DIAGNOSIS — I25.5 ISCHEMIC CARDIOMYOPATHY: ICD-10-CM

## 2023-06-05 LAB
ASCENDING AORTA: 1.79 CM
AV INDEX (PROSTH): 0.84
AV MEAN GRADIENT: 5 MMHG
AV PEAK GRADIENT: 9 MMHG
AV VALVE AREA: 2.57 CM2
AV VELOCITY RATIO: 0.69
BSA FOR ECHO PROCEDURE: 1.68 M2
CV ECHO LV RWT: 0.63 CM
DOP CALC AO PEAK VEL: 1.53 M/S
DOP CALC AO VTI: 33.6 CM
DOP CALC LVOT AREA: 3.1 CM2
DOP CALC LVOT DIAMETER: 1.98 CM
DOP CALC LVOT PEAK VEL: 1.06 M/S
DOP CALC LVOT STROKE VOLUME: 86.48 CM3
DOP CALCLVOT PEAK VEL VTI: 28.1 CM
E WAVE DECELERATION TIME: 108.44 MSEC
E/A RATIO: 1.12
E/E' RATIO: 14.59 M/S
ECHO LV POSTERIOR WALL: 1.19 CM (ref 0.6–1.1)
EJECTION FRACTION: 50 %
FRACTIONAL SHORTENING: 28 % (ref 28–44)
INTERVENTRICULAR SEPTUM: 1.04 CM (ref 0.6–1.1)
IVRT: 108.47 MSEC
LA MAJOR: 5.27 CM
LA MINOR: 5.17 CM
LA WIDTH: 4 CM
LEFT ATRIUM SIZE: 3.61 CM
LEFT ATRIUM VOLUME INDEX: 39.3 ML/M2
LEFT ATRIUM VOLUME: 64.06 CM3
LEFT INTERNAL DIMENSION IN SYSTOLE: 2.73 CM (ref 2.1–4)
LEFT VENTRICLE DIASTOLIC VOLUME INDEX: 37.77 ML/M2
LEFT VENTRICLE DIASTOLIC VOLUME: 61.57 ML
LEFT VENTRICLE MASS INDEX: 84 G/M2
LEFT VENTRICLE SYSTOLIC VOLUME INDEX: 17 ML/M2
LEFT VENTRICLE SYSTOLIC VOLUME: 27.73 ML
LEFT VENTRICULAR INTERNAL DIMENSION IN DIASTOLE: 3.79 CM (ref 3.5–6)
LEFT VENTRICULAR MASS: 136.82 G
LV LATERAL E/E' RATIO: 11.27 M/S
LV SEPTAL E/E' RATIO: 20.67 M/S
LVOT MG: 2.86 MMHG
LVOT MV: 0.8 CM/S
MV PEAK A VEL: 1.11 M/S
MV PEAK E VEL: 1.24 M/S
MV STENOSIS PRESSURE HALF TIME: 31.45 MS
MV VALVE AREA P 1/2 METHOD: 7 CM2
PISA MRMAX VEL: 6.12 M/S
PISA TR MAX VEL: 2.94 M/S
PULM VEIN S/D RATIO: 1.31
PV PEAK D VEL: 0.71 M/S
PV PEAK S VEL: 0.93 M/S
RA MAJOR: 4.48 CM
RA PRESSURE: 3 MMHG
RA WIDTH: 3.3 CM
RIGHT VENTRICULAR END-DIASTOLIC DIMENSION: 3.45 CM
RIGHT VENTRICULAR LENGTH IN DIASTOLE (APICAL 4-CHAMBER VIEW): 5.12 CM
RV MID DIAMA: 3.73 CM
RV TISSUE DOPPLER FREE WALL SYSTOLIC VELOCITY 1 (APICAL 4 CHAMBER VIEW): 0.01 CM/S
SINUS: 2.25 CM
STJ: 2.01 CM
TDI LATERAL: 0.11 M/S
TDI SEPTAL: 0.06 M/S
TDI: 0.09 M/S
TR MAX PG: 35 MMHG
TRICUSPID ANNULAR PLANE SYSTOLIC EXCURSION: 1.33 CM
TV REST PULMONARY ARTERY PRESSURE: 38 MMHG

## 2023-06-05 PROCEDURE — 93306 ECHO (CUPID ONLY): ICD-10-PCS | Mod: 26,,, | Performed by: INTERNAL MEDICINE

## 2023-06-05 PROCEDURE — 93306 TTE W/DOPPLER COMPLETE: CPT | Mod: PO

## 2023-06-05 PROCEDURE — 93306 TTE W/DOPPLER COMPLETE: CPT | Mod: 26,,, | Performed by: INTERNAL MEDICINE

## 2023-06-08 RX ORDER — METFORMIN HYDROCHLORIDE 500 MG/1
TABLET ORAL
Qty: 90 TABLET | Refills: 0 | Status: SHIPPED | OUTPATIENT
Start: 2023-06-08 | End: 2023-09-13 | Stop reason: SDUPTHER

## 2023-06-13 ENCOUNTER — OFFICE VISIT (OUTPATIENT)
Dept: CARDIOLOGY | Facility: CLINIC | Age: 88
End: 2023-06-13
Payer: MEDICARE

## 2023-06-13 VITALS
SYSTOLIC BLOOD PRESSURE: 136 MMHG | WEIGHT: 153 LBS | BODY MASS INDEX: 30.84 KG/M2 | HEART RATE: 76 BPM | DIASTOLIC BLOOD PRESSURE: 66 MMHG | HEIGHT: 59 IN

## 2023-06-13 DIAGNOSIS — I25.708 CORONARY ARTERY DISEASE OF BYPASS GRAFT OF NATIVE HEART WITH STABLE ANGINA PECTORIS: Primary | Chronic | ICD-10-CM

## 2023-06-13 DIAGNOSIS — N18.32 STAGE 3B CHRONIC KIDNEY DISEASE: Chronic | ICD-10-CM

## 2023-06-13 DIAGNOSIS — I34.0 NONRHEUMATIC MITRAL VALVE REGURGITATION: ICD-10-CM

## 2023-06-13 DIAGNOSIS — I87.2 VENOUS INSUFFICIENCY OF BOTH LOWER EXTREMITIES: Chronic | ICD-10-CM

## 2023-06-13 DIAGNOSIS — E78.00 HYPERCHOLESTEROLEMIA: Chronic | ICD-10-CM

## 2023-06-13 DIAGNOSIS — I10 PRIMARY HYPERTENSION: Chronic | ICD-10-CM

## 2023-06-13 PROCEDURE — 1159F PR MEDICATION LIST DOCUMENTED IN MEDICAL RECORD: ICD-10-PCS | Mod: CPTII,S$GLB,, | Performed by: INTERNAL MEDICINE

## 2023-06-13 PROCEDURE — 1126F AMNT PAIN NOTED NONE PRSNT: CPT | Mod: CPTII,S$GLB,, | Performed by: INTERNAL MEDICINE

## 2023-06-13 PROCEDURE — 1159F MED LIST DOCD IN RCRD: CPT | Mod: CPTII,S$GLB,, | Performed by: INTERNAL MEDICINE

## 2023-06-13 PROCEDURE — 1126F PR PAIN SEVERITY QUANTIFIED, NO PAIN PRESENT: ICD-10-PCS | Mod: CPTII,S$GLB,, | Performed by: INTERNAL MEDICINE

## 2023-06-13 PROCEDURE — 3288F PR FALLS RISK ASSESSMENT DOCUMENTED: ICD-10-PCS | Mod: CPTII,S$GLB,, | Performed by: INTERNAL MEDICINE

## 2023-06-13 PROCEDURE — 99214 OFFICE O/P EST MOD 30 MIN: CPT | Mod: S$GLB,,, | Performed by: INTERNAL MEDICINE

## 2023-06-13 PROCEDURE — 1101F PR PT FALLS ASSESS DOC 0-1 FALLS W/OUT INJ PAST YR: ICD-10-PCS | Mod: CPTII,S$GLB,, | Performed by: INTERNAL MEDICINE

## 2023-06-13 PROCEDURE — 99214 PR OFFICE/OUTPT VISIT, EST, LEVL IV, 30-39 MIN: ICD-10-PCS | Mod: S$GLB,,, | Performed by: INTERNAL MEDICINE

## 2023-06-13 PROCEDURE — 1101F PT FALLS ASSESS-DOCD LE1/YR: CPT | Mod: CPTII,S$GLB,, | Performed by: INTERNAL MEDICINE

## 2023-06-13 PROCEDURE — 99999 PR PBB SHADOW E&M-EST. PATIENT-LVL IV: ICD-10-PCS | Mod: PBBFAC,,, | Performed by: INTERNAL MEDICINE

## 2023-06-13 PROCEDURE — 3288F FALL RISK ASSESSMENT DOCD: CPT | Mod: CPTII,S$GLB,, | Performed by: INTERNAL MEDICINE

## 2023-06-13 PROCEDURE — 99999 PR PBB SHADOW E&M-EST. PATIENT-LVL IV: CPT | Mod: PBBFAC,,, | Performed by: INTERNAL MEDICINE

## 2023-06-13 RX ORDER — ATORVASTATIN CALCIUM 40 MG/1
40 TABLET, FILM COATED ORAL DAILY
Qty: 90 TABLET | Refills: 1 | Status: SHIPPED | OUTPATIENT
Start: 2023-06-13 | End: 2023-12-19 | Stop reason: SDUPTHER

## 2023-06-13 NOTE — PROGRESS NOTES
Subjective:    Patient ID:  Dolores B LeJeune is a 87 y.o. female who presents for Edema and Coronary Artery Disease        Edema  Pertinent negatives include no abdominal pain, change in bowel habit, chest pain, chills, congestion, coughing, diaphoresis, fever, nausea, numbness, rash or weakness.   Coronary Artery Disease  Symptoms include leg swelling. Pertinent negatives include no chest pain, palpitations or shortness of breath.   ECHOM EF 5-55 %, MILD MR, LAE, DOING OK, MILD ANKLE EDEMA, STANDS  A LOT, RECENT LABS HBA1C 7.4 %, GFR  44, HB 10.8, OVERALL DOING WELL SOME ANKLE EDEMA TOWARDS THE END OF THE DAY SHE STANDS A LOT, SEE ROS    Past Medical History:   Diagnosis Date    Acute MI     Anticoagulant long-term use     Carpal tunnel syndrome     Cataracts, bilateral     Colon polyps     Coronary artery disease     DM type 2 (diabetes mellitus, type 2)     HTN (hypertension)     Hyperlipidemia     Osteoarthritis     Peripheral neuropathy      Past Surgical History:   Procedure Laterality Date    CAROTID STENT      1    COLONOSCOPY  2008  [facility]    A 3 mm by 6 mm polyp in the cecum.  FRAGMENTS OF TUBULAR ADENOMA.    A 1 mm polyp in the recto-sigmoid colon.  HYPERPLASTIC POLYP.    Extremely redundant colon.      CORONARY ARTERY BYPASS GRAFT  2018    [facility], Dr. Echols. LIMA to LAD, SVG to OM1, SVG to OM2, SVG to PDA    EYE SURGERY      eye surgery    HYSTERECTOMY      ovaries remain    ptyregium      TONSILLECTOMY       Family History   Problem Relation Age of Onset    Tremor Mother     Hypertension Mother     Heart disease Mother     Stroke Father      Social History     Socioeconomic History    Marital status:     Number of children: 4   Tobacco Use    Smoking status: Former     Types: Cigarettes     Quit date: 3/15/1972     Years since quittin.2    Smokeless tobacco: Never   Substance and Sexual Activity    Alcohol use: No    Drug use: No       Review of patient's allergies indicates:    Allergen Reactions    Albuterol Other (See Comments)     Palpitations/tremor      Sulfa (sulfonamide antibiotics) Rash       Current Outpatient Medications:     ACCU-CHEK SOFTCLIX LANCETS [facility] [place], USE ONE AS DIRECTED TWICE DAILY, Disp: 200 each, Rfl: 10    aspirin (ECOTRIN) 81 MG EC tablet, Take 81 mg by mouth once daily., Disp: , Rfl:     biotin 5,000 mcg TbDL, Take by mouth., Disp: , Rfl:     CALCIUM CARBONATE/VITAMIN D3 (CALCIUM 500 WITH D ORAL), Take by mouth., Disp: , Rfl:     ciclopirox (PENLAC) 8 % Soln, Apply topically nightly., Disp: 6.6 mL, Rfl: 11    clopidogreL (PLAVIX) 75 mg tablet, TAKE ONE TABLET BY MOUTH DAILY, Disp: 90 tablet, Rfl: 1    DEXTRAN 70/HYPROMELLOSE (ARTIFICIAL TEARS OPHT), Apply to eye., Disp: , Rfl:     gabapentin (NEURONTIN) 300 MG capsule, TAKE ONE CAPSULE BY MOUTH DAILY, Disp: 90 capsule, Rfl: 3    HYDROcodone-acetaminophen (NORCO) 5-325 mg per tablet, Take 1 tablet by mouth every 6 (six) hours as needed for Pain., Disp: 20 tablet, Rfl: 0    LIDOcaine (LIDODERM) 5 %, Place 1 patch onto the skin once daily. Leave patch on for 12 hours then remove and do not place another patch to this area for at least 12 hours., Disp: 15 patch, Rfl: 0    losartan (COZAAR) 25 MG tablet, Take 0.5 tablets (12.5 mg total) by mouth every evening., Disp: 45 tablet, Rfl: 1    meclizine (ANTIVERT) 25 mg tablet, Take 1 tablet by mouth Three times daily as needed., Disp: , Rfl:     metFORMIN (GLUCOPHAGE) 500 MG tablet, TAKE 1/2 TABLET BY MOUTH TWICE DAILY WITH FOOD, Disp: 90 tablet, Rfl: 0    metoprolol succinate (TOPROL-XL) 25 MG 24 hr tablet, Take 0.5 tablets (12.5 mg total) by mouth once daily., Disp: 45 tablet, Rfl: 1    MYRBETRIQ 50 mg Tb24, TAKE ONE TABLET BY MOUTH DAILY, Disp: 90 tablet, Rfl: 1    ONETOUCH DELICA LANCETS 33 gauge Misc, , Disp: , Rfl:     ONETOUCH VERIO TEST STRIPS Strp, USE ONE STRIP FOR TESTING TWICE DAILY, Disp: 200 each, Rfl: 3    RUTIN/HESP/BIOFLAV/C/[id number] (BIOFLEX ORAL), Take 1  "tablet by mouth once daily at 6am., Disp: , Rfl:     atorvastatin (LIPITOR) 40 MG tablet, Take 1 tablet (40 mg total) by mouth once daily., Disp: 90 tablet, Rfl: 1    pantoprazole (PROTONIX) 40 MG tablet, Take 1 tablet (40 mg total) by mouth once daily. (Patient taking differently: Take 40 mg by mouth as needed.), Disp: 90 tablet, Rfl: 1    Review of Systems   Constitutional: Negative for chills, diaphoresis, fever, malaise/fatigue, night sweats and weight loss.   HENT:  Negative for congestion and nosebleeds.    Eyes:  Negative for blurred vision and visual disturbance.   Cardiovascular:  Positive for leg swelling. Negative for chest pain, claudication, cyanosis, dyspnea on exertion (MINIMAL), irregular heartbeat, near-syncope, orthopnea, palpitations, paroxysmal nocturnal dyspnea and syncope.   Respiratory:  Negative for cough, hemoptysis, shortness of breath and wheezing.    Endocrine: Negative for polydipsia and polyuria.   Hematologic/Lymphatic: Negative for adenopathy. Does not bruise/bleed easily.   Skin:  Negative for itching and rash.   Musculoskeletal:  Negative for back pain, falls and joint pain.   Gastrointestinal:  Positive for flatus. Negative for abdominal pain, change in bowel habit, jaundice, melena and nausea.   Genitourinary:  Negative for dysuria and flank pain.   Neurological:  Negative for brief paralysis, focal weakness, light-headedness, loss of balance, numbness, tremors and weakness.   Psychiatric/Behavioral:  Negative for altered mental status and depression.    Allergic/Immunologic: Negative for persistent infections.      Objective:      Vitals:    06/13/23 1546   BP: 136/66   Pulse: 76   Weight: 69.4 kg (153 lb)   Height: 4' 11" (1.499 m)   PainSc: 0-No pain     Body mass index is 30.9 kg/m².    Physical Exam  Constitutional:       Appearance: She is well-developed. She is obese.   HENT:      Head: Normocephalic and atraumatic.   Eyes:      General: No scleral icterus.     Extraocular " Movements: Extraocular movements intact.   Neck:      Vascular: Normal carotid pulses. No JVD.   Cardiovascular:      Rate and Rhythm: Normal rate and regular rhythm. No extrasystoles are present.     Pulses:           Carotid pulses are 2+ on the right side and 2+ on the left side.       Radial pulses are 2+ on the right side and 2+ on the left side.      Heart sounds: Murmur heard.   Systolic murmur is present with a grade of 1/6 at the lower left sternal border.     No friction rub. No gallop.   Pulmonary:      Effort: Pulmonary effort is normal.      Breath sounds: Normal breath sounds and air entry. No rales.   Chest:      Comments: STERNOTOMY SCAR  Abdominal:      Palpations: Abdomen is soft. There is no hepatomegaly or mass.      Tenderness: There is no abdominal tenderness.   Musculoskeletal:         General: Normal range of motion.      Cervical back: Neck supple.      Right lower le+ Edema present.      Left lower le+ Edema present.      Comments:  VARICOSE VEINS   Skin:     General: Skin is warm and dry.      Capillary Refill: Capillary refill takes less than 2 seconds.   Neurological:      General: No focal deficit present.      Mental Status: She is alert and oriented to person, place, and time.      Cranial Nerves: Cranial nerves 2-12 are intact.   Psychiatric:         Mood and Affect: Mood normal.         Speech: Speech normal.         Behavior: Behavior normal.               ..    Chemistry        Component Value Date/Time     2023 0938    K 4.5 2023 0938     2023 0938    CO2 31 (H) 2023 0938    BUN 18 2023 0938    CREATININE 1.2 2023 0938     (H) 2023 0938        Component Value Date/Time    CALCIUM 9.3 2023 0938    ALKPHOS 73 2023 0938    AST 20 2023 0938    ALT 16 2023 0938    BILITOT 0.4 2023 0938    ESTGFRAFRICA 43.2 (A) 2022 0918    EGFRNONAA 37.5 (A) 2022 0918            ..  Lab  Results   Component Value Date    CHOL 125 11/01/2022    CHOL 113 (L) 08/16/2021    CHOL 126 07/20/2020     Lab Results   Component Value Date    HDL 41 11/01/2022    HDL 34 (L) 08/16/2021    HDL 34 (L) 07/20/2020     Lab Results   Component Value Date    LDLCALC 65.8 11/01/2022    LDLCALC 51.6 (L) 08/16/2021    LDLCALC 72.0 07/20/2020     Lab Results   Component Value Date    TRIG 91 11/01/2022    TRIG 137 08/16/2021    TRIG 100 07/20/2020     Lab Results   Component Value Date    CHOLHDL 32.8 11/01/2022    CHOLHDL 30.1 08/16/2021    CHOLHDL 27.0 07/20/2020     ..  Lab Results   Component Value Date    WBC 8.07 05/02/2023    HGB 10.8 (L) 05/02/2023    HCT 35.0 (L) 05/02/2023    MCV 98 05/02/2023     05/02/2023       Test(s) Reviewed  I have reviewed the following in detail:  [] Stress test   [] Angiography   [x] Echocardiogram   [x] Labs   [] Other:       Assessment:         ICD-10-CM ICD-9-CM   1. 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 Nonrheumatic mitral valve regurgitation  I34.0 424.0   3. Venous insufficiency of both lower extremities  I87.2 459.81   4. Stage 3b chronic kidney disease  N18.32 585.3   5. Primary hypertension  I10 401.9   6. 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 - Primary    Relevant Orders    Lipid Panel    Venous insufficiency of both lower extremities    Nonrheumatic mitral valve regurgitation       Renal/    Stage 3b chronic kidney disease        Plan:     SUPPORT STOCKINGS, AVOID PROLONGED STANDING SITTING,ALL CV CLINICALLY STABLE, CLASS ANGINA, NO HF, NO TIA, NO CLINICAL ARRHYTHMIA,CONTINUE CURRENT MEDS,  EDUCATION, DIET, EXERCISE, WEIGHT LOSS RETURN TO CLINIC IN  6 MO WITH LABS, DISCUSSED PLAN WITH THE PATIENT AND HER DAUGHTER, INCREASED CV RISK WITH CKD      Coronary artery disease 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  Dispense: 90 tablet; Refill: 1  -     Lipid Panel; Future; Expected date: 12/13/2023    RTC Low level/low impact aerobic exercise 5x's/wk. Heart healthy diet and risk factor modification.    See labs and med orders.    Aerobic exercise 5x's/wk. Heart healthy diet and risk factor modification.    See labs and med orders.

## 2023-06-27 ENCOUNTER — HOSPITAL ENCOUNTER (OUTPATIENT)
Dept: RADIOLOGY | Facility: HOSPITAL | Age: 88
Discharge: HOME | End: 2023-06-27
Attending: FAMILY MEDICINE
Payer: MEDICARE

## 2023-06-27 DIAGNOSIS — Z12.31 ENCOUNTER FOR SCREENING MAMMOGRAM FOR MALIGNANT NEOPLASM OF BREAST: ICD-10-CM

## 2023-06-27 PROCEDURE — 77063 MAMMO DIGITAL SCREENING BILAT WITH TOMO: ICD-10-PCS | Mod: 26,,, | Performed by: RADIOLOGY

## 2023-06-27 PROCEDURE — 77067 SCR MAMMO BI INCL CAD: CPT | Mod: TC,PO

## 2023-06-27 PROCEDURE — 77063 BREAST TOMOSYNTHESIS BI: CPT | Mod: 26,,, | Performed by: RADIOLOGY

## 2023-06-27 PROCEDURE — 77067 SCR MAMMO BI INCL CAD: CPT | Mod: 26,,, | Performed by: RADIOLOGY

## 2023-06-27 PROCEDURE — 77067 MAMMO DIGITAL SCREENING BILAT WITH TOMO: ICD-10-PCS | Mod: 26,,, | Performed by: RADIOLOGY

## 2023-08-02 ENCOUNTER — TELEPHONE (OUTPATIENT)
Dept: FAMILY MEDICINE | Facility: CLINIC | Age: 88
End: 2023-08-02
Payer: MEDICARE

## 2023-08-02 DIAGNOSIS — E78.00 HYPERCHOLESTEROLEMIA: ICD-10-CM

## 2023-08-02 DIAGNOSIS — E78.5 TYPE 2 DIABETES MELLITUS WITH HYPERLIPIDEMIA: ICD-10-CM

## 2023-08-02 DIAGNOSIS — E11.69 TYPE 2 DIABETES MELLITUS WITH HYPERLIPIDEMIA: ICD-10-CM

## 2023-08-02 DIAGNOSIS — I10 PRIMARY HYPERTENSION: Primary | ICD-10-CM

## 2023-08-02 DIAGNOSIS — E87.5 HYPERKALEMIA: ICD-10-CM

## 2023-08-02 DIAGNOSIS — E11.59 HYPERTENSION ASSOCIATED WITH DIABETES: ICD-10-CM

## 2023-08-02 DIAGNOSIS — E11.21 TYPE 2 DIABETES MELLITUS WITH DIABETIC NEPHROPATHY, WITHOUT LONG-TERM CURRENT USE OF INSULIN: ICD-10-CM

## 2023-08-02 DIAGNOSIS — I15.2 HYPERTENSION ASSOCIATED WITH DIABETES: ICD-10-CM

## 2023-08-02 NOTE — TELEPHONE ENCOUNTER
LMOR for pt to [facility] if lab appt does not work for her.   Labs ordered.  Lab appt made & labs linked to appt.

## 2023-08-02 NOTE — TELEPHONE ENCOUNTER
----- Message from Corine Pepe sent at 8/2/2023 10:15 AM CDT -----  Contact: self  Type: Needs Medical Advice  Who Called:  shante  Best Call Back Number: [phone]   Additional Information: pt has an appt on nov 14 and would like a lab order to get done prior to her appt please advise

## 2023-08-10 ENCOUNTER — OFFICE VISIT (OUTPATIENT)
Dept: PODIATRY | Facility: CLINIC | Age: 88
End: 2023-08-10
Payer: MEDICARE

## 2023-08-10 VITALS — BODY MASS INDEX: 30.84 KG/M2 | WEIGHT: 153 LBS | HEIGHT: 59 IN

## 2023-08-10 DIAGNOSIS — I73.9 PVD (PERIPHERAL VASCULAR DISEASE): Primary | ICD-10-CM

## 2023-08-10 DIAGNOSIS — I87.2 VENOUS INSUFFICIENCY (CHRONIC) (PERIPHERAL): ICD-10-CM

## 2023-08-10 DIAGNOSIS — B35.1 ONYCHOMYCOSIS DUE TO DERMATOPHYTE: ICD-10-CM

## 2023-08-10 PROCEDURE — 99999 PR PBB SHADOW E&M-EST. PATIENT-LVL I: ICD-10-PCS | Mod: PBBFAC,,, | Performed by: PODIATRIST

## 2023-08-10 PROCEDURE — 99999 PR PBB SHADOW E&M-EST. PATIENT-LVL I: CPT | Mod: PBBFAC,,, | Performed by: PODIATRIST

## 2023-08-10 PROCEDURE — 11721 PR DEBRIDEMENT OF NAILS, 6 OR MORE: ICD-10-PCS | Mod: Q9,S$GLB,, | Performed by: PODIATRIST

## 2023-08-10 PROCEDURE — 99499 UNLISTED E&M SERVICE: CPT | Mod: S$GLB,,, | Performed by: PODIATRIST

## 2023-08-10 PROCEDURE — 99499 NO LOS: ICD-10-PCS | Mod: S$GLB,,, | Performed by: PODIATRIST

## 2023-08-10 PROCEDURE — 11721 DEBRIDE NAIL 6 OR MORE: CPT | Mod: Q9,S$GLB,, | Performed by: PODIATRIST

## 2023-08-10 NOTE — PROGRESS NOTES
Subjective:      Patient ID: Dolores B LeJeune is a 88 y.o. female.    Chief Complaint: Nail Care      Kelly is a 88 y.o. female witha past medical history of Acute MI, Anticoagulant long-term use, Carpal tunnel syndrome, Cataracts, bilateral, Colon polyps, Coronary artery disease, DM type 2 (diabetes mellitus, type 2), HTN (hypertension), Hyperlipidemia, Osteoarthritis, and Peripheral neuropathy. The patient's chief complaint consists of toenails that are in need of trimming.  Denies experiencing pain from the nails with today's exam.  Has not attempted to self treat.  Denies symptoms of claudication or rest pain.  Denies any additional pedal complaints.      PCP: Silvia Soto MD  Last visit:   Past Medical History:   Diagnosis Date    Acute MI     Anticoagulant long-term use     Carpal tunnel syndrome     Cataracts, bilateral     Colon polyps     Coronary artery disease     DM type 2 (diabetes mellitus, type 2)     HTN (hypertension)     Hyperlipidemia     Osteoarthritis     Peripheral neuropathy        Past Surgical History:   Procedure Laterality Date    CAROTID STENT      1    COLONOSCOPY  2008  [facility]    A 3 mm by 6 mm polyp in the cecum.  FRAGMENTS OF TUBULAR ADENOMA.    A 1 mm polyp in the recto-sigmoid colon.  HYPERPLASTIC POLYP.    Extremely redundant colon.      CORONARY ARTERY BYPASS GRAFT  2018    [facility], Dr. Echols. LIMA to LAD, SVG to OM1, SVG to OM2, SVG to PDA    EYE SURGERY      eye surgery    HYSTERECTOMY      ovaries remain    ptyregium      TONSILLECTOMY         Family History   Problem Relation Age of Onset    Tremor Mother     Hypertension Mother     Heart disease Mother     Stroke Father        Social History     Socioeconomic History    Marital status:     Number of children: 4   Tobacco Use    Smoking status: Former     Current packs/day: 0.00     Types: Cigarettes     Quit date: 3/15/1972     Years since quittin.4    Smokeless tobacco: Never   Substance and Sexual  Activity    Alcohol use: No    Drug use: No       Current Outpatient Medications   Medication Sig Dispense Refill    ACCU-CHEK SOFTCLIX LANCETS [facility] USE ONE AS DIRECTED TWICE DAILY 200 each 10    aspirin (ECOTRIN) 81 MG EC tablet Take 81 mg by mouth once daily.      atorvastatin (LIPITOR) 40 MG tablet Take 1 tablet (40 mg total) by mouth once daily. 90 tablet 1    biotin 5,000 mcg TbDL Take by mouth.      CALCIUM CARBONATE/VITAMIN D3 (CALCIUM 500 WITH D ORAL) Take by mouth.      ciclopirox (PENLAC) 8 % Soln Apply topically nightly. 6.6 mL 11    clopidogreL (PLAVIX) 75 mg tablet TAKE ONE TABLET BY MOUTH DAILY 90 tablet 1    DEXTRAN 70/HYPROMELLOSE (ARTIFICIAL TEARS OPHT) Apply to eye.      gabapentin (NEURONTIN) 300 MG capsule TAKE ONE CAPSULE BY MOUTH DAILY 90 capsule 3    HYDROcodone-acetaminophen (NORCO) 5-325 mg per tablet Take 1 tablet by mouth every 6 (six) hours as needed for Pain. 20 tablet 0    LIDOcaine (LIDODERM) 5 % Place 1 patch onto the skin once daily. Leave patch on for 12 hours then remove and do not place another patch to this area for at least 12 hours. 15 patch 0    losartan (COZAAR) 25 MG tablet TAKE 1/2 TABLET BY MOUTH IN THE EVENING 45 tablet 1    meclizine (ANTIVERT) 25 mg tablet Take 1 tablet by mouth Three times daily as needed.      metFORMIN (GLUCOPHAGE) 500 MG tablet TAKE 1/2 TABLET BY MOUTH TWICE DAILY WITH FOOD 90 tablet 0    metoprolol succinate (TOPROL-XL) 25 MG 24 hr tablet Take 0.5 tablets (12.5 mg total) by mouth once daily. 45 tablet 1    MYRBETRIQ 50 mg Tb24 TAKE ONE TABLET BY MOUTH DAILY 90 tablet 1    ONETOUCH DELICA LANCETS 33 gauge Misc       ONETOUCH VERIO TEST STRIPS Strp USE ONE STRIP FOR TESTING TWICE DAILY 200 each 3    pantoprazole (PROTONIX) 40 MG tablet Take 1 tablet (40 mg total) by mouth once daily. (Patient taking differently: Take 40 mg by mouth as needed.) 90 tablet 1    RUTIN/HESP/BIOFLAV/C/[id number] (BIOFLEX ORAL) Take 1 tablet by mouth once daily at 6am.        No current facility-administered medications for this visit.       Review of patient's allergies indicates:   Allergen Reactions    Albuterol Other (See Comments)     Palpitations/tremor      Sulfa (sulfonamide antibiotics) Rash       Hemoglobin A1C   Date Value Ref Range Status   05/02/2023 7.4 (H) 4.0 - 5.6 % Final     Comment:     ADA Screening Guidelines:  5.7-6.4%  Consistent with prediabetes  >or=6.5%  Consistent with diabetes    High levels of fetal hemoglobin interfere with the HbA1C  assay. Heterozygous hemoglobin variants (HbS, HgC, etc)do  not significantly interfere with this assay.   However, presence of multiple variants may affect accuracy.     11/01/2022 7.3 (H) 4.0 - 5.6 % Final     Comment:     ADA Screening Guidelines:  5.7-6.4%  Consistent with prediabetes  >or=6.5%  Consistent with diabetes    High levels of fetal hemoglobin interfere with the HbA1C  assay. Heterozygous hemoglobin variants (HbS, HgC, etc)do  not significantly interfere with this assay.   However, presence of multiple variants may affect accuracy.     02/24/2022 7.1 (H) 4.0 - 5.6 % Final     Comment:     ADA Screening Guidelines:  5.7-6.4%  Consistent with prediabetes  >or=6.5%  Consistent with diabetes    High levels of fetal hemoglobin interfere with the HbA1C  assay. Heterozygous hemoglobin variants (HbS, HgC, etc)do  not significantly interfere with this assay.   However, presence of multiple variants may affect accuracy.         Review of Systems   Constitutional: Negative for chills and fever.   Cardiovascular:  Positive for leg swelling. Negative for claudication.   Skin:  Positive for color change and nail changes.   Musculoskeletal:  Positive for joint swelling. Negative for joint pain, muscle cramps and muscle weakness.   Gastrointestinal:  Negative for nausea and vomiting.   Neurological:  Negative for numbness and paresthesias.   Psychiatric/Behavioral:  Negative for altered mental status.            Objective:       Physical Exam  Constitutional:       Appearance: Normal appearance. She is not ill-appearing.   Cardiovascular:      Pulses:           Dorsalis pedis pulses are 1+ on the right side and 1+ on the left side.        Posterior tibial pulses are 1+ on the right side and 1+ on the left side.      Comments: CFT is 3 seconds bilateral.  Pedal hair growth is absent bilateral.  Varicosities noted bilateral.  Moderate non pitting lower extremity edema noted bilateral.  Toes are cool to touch bilateral.    Musculoskeletal:         General: Swelling present. No tenderness or signs of injury.      Comments: Muscle strength 5/5 in all muscle groups bilateral.  No tenderness nor crepitation with ROM of foot/ankle joints bilateral.  (-) for pain with palpation of the medial and lateral borders of bilateral hallux toenail.    Skin:     General: Skin is warm and dry.      Capillary Refill: Capillary refill takes more than 3 seconds.      Findings: No bruising, ecchymosis, erythema, signs of injury, laceration, lesion, petechiae, rash or wound.      Comments: Increased pedal turgor noted bilateral.  Toenails x 10 appear thickened by 2 mm, elongated by 4 mm, and discolored with subungual debris.  No localized sign of bacterial infection noted.    Neurological:      General: No focal deficit present.      Mental Status: She is alert.      Sensory: No sensory deficit.      Motor: No weakness or atrophy.      Comments: Light touch is intact bilateral.               Assessment:       Encounter Diagnoses   Name Primary?    PVD (peripheral vascular disease) Yes    Venous insufficiency (chronic) (peripheral)     Onychomycosis due to dermatophyte            Plan:       Kelly was seen today for nail care.    Diagnoses and all orders for this visit:    PVD (peripheral vascular disease)    Venous insufficiency (chronic) (peripheral)    Onychomycosis due to dermatophyte        I counseled the patient on her conditions, their implications and  medical management.    Exam remains unchanged in comparison to the prior exam.  Stable PVD of the lower extremities at this time.    Advised to inspect the LE's daily for signs of stasis dermatitis and venous ulceration.      Patient instructed on proper foot hygeine. We discussed wearing proper shoe gear, daily foot inspections, never walking without protective shoe gear, never putting sharp instruments to feet    With patient's permission, nails were aggressively reduced and debrided x 10 to their soft tissue attachment mechanically and with electric , removing all offending nail and debris. Patient relates relief following the procedure. She will continue to monitor the areas daily, inspect her feet, wear protective shoe gear when ambulatory, moisturizer to maintain skin integrity and follow in this office in approximately 3 months, sooner p.r.n.    RTC for routine high risk exam in three months.      Lucian Fernandez DPM

## 2023-08-14 RX ORDER — METOPROLOL SUCCINATE 25 MG/1
12.5 TABLET, EXTENDED RELEASE ORAL
Qty: 45 TABLET | Refills: 1 | Status: SHIPPED | OUTPATIENT
Start: 2023-08-14 | End: 2024-02-14 | Stop reason: SDUPTHER

## 2023-09-08 RX ORDER — CLOPIDOGREL BISULFATE 75 MG/1
TABLET ORAL
Qty: 90 TABLET | Refills: 1 | Status: SHIPPED | OUTPATIENT
Start: 2023-09-08 | End: 2024-03-11

## 2023-09-13 ENCOUNTER — OFFICE VISIT (OUTPATIENT)
Dept: FAMILY MEDICINE | Facility: CLINIC | Age: 88
End: 2023-09-13
Payer: MEDICARE

## 2023-09-13 VITALS
HEART RATE: 87 BPM | SYSTOLIC BLOOD PRESSURE: 128 MMHG | DIASTOLIC BLOOD PRESSURE: 61 MMHG | HEIGHT: 59 IN | WEIGHT: 151.19 LBS | BODY MASS INDEX: 30.48 KG/M2 | TEMPERATURE: 98 F

## 2023-09-13 DIAGNOSIS — J06.9 UPPER RESPIRATORY TRACT INFECTION, UNSPECIFIED TYPE: Primary | ICD-10-CM

## 2023-09-13 DIAGNOSIS — E11.69 TYPE 2 DIABETES MELLITUS WITH HYPERLIPIDEMIA: ICD-10-CM

## 2023-09-13 DIAGNOSIS — E11.59 HYPERTENSION ASSOCIATED WITH DIABETES: ICD-10-CM

## 2023-09-13 DIAGNOSIS — E78.5 TYPE 2 DIABETES MELLITUS WITH HYPERLIPIDEMIA: ICD-10-CM

## 2023-09-13 DIAGNOSIS — I15.2 HYPERTENSION ASSOCIATED WITH DIABETES: ICD-10-CM

## 2023-09-13 DIAGNOSIS — R05.9 COUGH, UNSPECIFIED TYPE: ICD-10-CM

## 2023-09-13 LAB
CTP QC/QA: YES
CTP QC/QA: YES
POC MOLECULAR INFLUENZA A AGN: NEGATIVE
POC MOLECULAR INFLUENZA B AGN: NEGATIVE
SARS-COV-2 RDRP RESP QL NAA+PROBE: NEGATIVE

## 2023-09-13 PROCEDURE — 87502 INFLUENZA DNA AMP PROBE: CPT | Mod: QW,S$GLB,, | Performed by: FAMILY MEDICINE

## 2023-09-13 PROCEDURE — 99213 PR OFFICE/OUTPT VISIT, EST, LEVL III, 20-29 MIN: ICD-10-PCS | Mod: S$GLB,,, | Performed by: FAMILY MEDICINE

## 2023-09-13 PROCEDURE — 3288F PR FALLS RISK ASSESSMENT DOCUMENTED: ICD-10-PCS | Mod: CPTII,S$GLB,, | Performed by: FAMILY MEDICINE

## 2023-09-13 PROCEDURE — 1101F PT FALLS ASSESS-DOCD LE1/YR: CPT | Mod: CPTII,S$GLB,, | Performed by: FAMILY MEDICINE

## 2023-09-13 PROCEDURE — 1126F PR PAIN SEVERITY QUANTIFIED, NO PAIN PRESENT: ICD-10-PCS | Mod: CPTII,S$GLB,, | Performed by: FAMILY MEDICINE

## 2023-09-13 PROCEDURE — 99999 PR PBB SHADOW E&M-EST. PATIENT-LVL III: ICD-10-PCS | Mod: PBBFAC,,, | Performed by: FAMILY MEDICINE

## 2023-09-13 PROCEDURE — 1126F AMNT PAIN NOTED NONE PRSNT: CPT | Mod: CPTII,S$GLB,, | Performed by: FAMILY MEDICINE

## 2023-09-13 PROCEDURE — 99999 PR PBB SHADOW E&M-EST. PATIENT-LVL III: CPT | Mod: PBBFAC,,, | Performed by: FAMILY MEDICINE

## 2023-09-13 PROCEDURE — 1101F PR PT FALLS ASSESS DOC 0-1 FALLS W/OUT INJ PAST YR: ICD-10-PCS | Mod: CPTII,S$GLB,, | Performed by: FAMILY MEDICINE

## 2023-09-13 PROCEDURE — 87502 POCT INFLUENZA A/B MOLECULAR: ICD-10-PCS | Mod: QW,S$GLB,, | Performed by: FAMILY MEDICINE

## 2023-09-13 PROCEDURE — 99213 OFFICE O/P EST LOW 20 MIN: CPT | Mod: S$GLB,,, | Performed by: FAMILY MEDICINE

## 2023-09-13 PROCEDURE — 3288F FALL RISK ASSESSMENT DOCD: CPT | Mod: CPTII,S$GLB,, | Performed by: FAMILY MEDICINE

## 2023-09-13 PROCEDURE — 87635: ICD-10-PCS | Mod: QW,S$GLB,, | Performed by: FAMILY MEDICINE

## 2023-09-13 PROCEDURE — 87635 SARS-COV-2 COVID-19 AMP PRB: CPT | Mod: QW,S$GLB,, | Performed by: FAMILY MEDICINE

## 2023-09-13 RX ORDER — METFORMIN HYDROCHLORIDE 500 MG/1
TABLET ORAL
Qty: 90 TABLET | Refills: 1 | Status: SHIPPED | OUTPATIENT
Start: 2023-09-13 | End: 2024-02-14

## 2023-09-13 RX ORDER — LORATADINE 10 MG/1
10 TABLET ORAL DAILY
Qty: 30 TABLET | COMMUNITY
Start: 2023-09-13 | End: 2024-09-12

## 2023-09-13 RX ORDER — METFORMIN HYDROCHLORIDE 500 MG/1
TABLET ORAL
Qty: 90 TABLET | Refills: 0 | OUTPATIENT
Start: 2023-09-13

## 2023-09-13 RX ORDER — BENZONATATE 200 MG/1
200 CAPSULE ORAL 3 TIMES DAILY PRN
Qty: 30 CAPSULE | Refills: 0 | Status: SHIPPED | OUTPATIENT
Start: 2023-09-13 | End: 2023-09-23

## 2023-09-13 RX ORDER — PANTOPRAZOLE SODIUM 40 MG/1
40 TABLET, DELAYED RELEASE ORAL DAILY
COMMUNITY

## 2023-09-13 NOTE — PROGRESS NOTES
Patient complains of head congestion, postnasal drainage, cough, no fever.  Symptoms started over the past 3 days.  Current treatment over-the-counter medication-cough medication.  Negative COVID test couple days ago.  Exposed to dust and pollen potentially recently..      Kelly was seen today for cough, nasal congestion and sore throat.    Diagnoses and all orders for this visit:    Upper respiratory tract infection, unspecified type  -     POCT COVID-19 Rapid Screening  -     POCT Influenza A/B Molecular    Cough, unspecified type    Other orders  -     loratadine (CLARITIN) 10 mg tablet; Take 1 tablet (10 mg total) by mouth once daily.  -     benzonatate (TESSALON) 200 MG capsule; Take 1 capsule (200 mg total) by mouth 3 (three) times daily as needed for Cough.    Testing above negative.    Follow-up as needed if symptoms not improving with Recommended treatment next few days      Past Medical History:  Past Medical History:   Diagnosis Date    Acute MI     Anticoagulant long-term use     Carpal tunnel syndrome     Cataracts, bilateral     Colon polyps     Coronary artery disease     DM type 2 (diabetes mellitus, type 2)     HTN (hypertension)     Hyperlipidemia     Osteoarthritis     Peripheral neuropathy      Past Surgical History:   Procedure Laterality Date    CAROTID STENT      1    COLONOSCOPY  1/8/2008  Gurvinder    A 3 mm by 6 mm polyp in the cecum.  FRAGMENTS OF TUBULAR ADENOMA.    A 1 mm polyp in the recto-sigmoid colon.  HYPERPLASTIC POLYP.    Extremely redundant colon.      CORONARY ARTERY BYPASS GRAFT  01/24/2018    ST, Dr. Echols. LIMA to LAD, SVG to OM1, SVG to OM2, SVG to PDA    EYE SURGERY      eye surgery    HYSTERECTOMY      ovaries remain    ptyregium      TONSILLECTOMY       Social History     Socioeconomic History    Marital status:     Number of children: 4   Tobacco Use    Smoking status: Former     Current packs/day: 0.00     Average packs/day: 0.5 packs/day for 16.2 years (8.1  ttl pk-yrs)     Types: Cigarettes     Start date:      Quit date: 3/15/1972     Years since quittin.5    Smokeless tobacco: Never   Substance and Sexual Activity    Alcohol use: No    Drug use: No     Family History   Problem Relation Age of Onset    Tremor Mother     Hypertension Mother     Heart disease Mother     Stroke Father      Review of patient's allergies indicates:   Allergen Reactions    Albuterol Other (See Comments)     Palpitations/tremor      Sulfa (sulfonamide antibiotics) Rash     Current Outpatient Medications on File Prior to Visit   Medication Sig Dispense Refill    aspirin (ECOTRIN) 81 MG EC tablet Take 81 mg by mouth once daily.      atorvastatin (LIPITOR) 40 MG tablet Take 1 tablet (40 mg total) by mouth once daily. 90 tablet 1    CALCIUM CARBONATE/VITAMIN D3 (CALCIUM 500 WITH D ORAL) Take by mouth.      clopidogreL (PLAVIX) 75 mg tablet TAKE  ONE TABLET BY MOUTH DAILY 90 tablet 1    DEXTRAN 70/HYPROMELLOSE (ARTIFICIAL TEARS OPHT) Apply to eye.      losartan (COZAAR) 25 MG tablet TAKE 1/2 TABLET BY MOUTH IN THE EVENING 45 tablet 1    meclizine (ANTIVERT) 25 mg tablet Take 1 tablet by mouth Three times daily as needed.      metFORMIN (GLUCOPHAGE) 500 MG tablet TAKE 1/2 TABLET BY MOUTH TWICE DAILY WITH FOOD 90 tablet 0    metoprolol succinate (TOPROL-XL) 25 MG 24 hr tablet TAKE ONE-HALF TABLET BY MOUTH DAILY 45 tablet 1    MYRBETRIQ 50 mg Tb24 TAKE ONE TABLET BY MOUTH DAILY 90 tablet 1    pantoprazole (PROTONIX) 40 MG tablet Take 40 mg by mouth once daily.      [DISCONTINUED] biotin 5,000 mcg TbDL Take by mouth.      ACCU-CHEK SOFTCLIX LANCETS [facility] USE ONE AS DIRECTED TWICE DAILY 200 each 10    gabapentin (NEURONTIN) 300 MG capsule TAKE ONE CAPSULE BY MOUTH DAILY 90 capsule 3    ONETOUCH DELICA LANCETS 33 gauge Misc       ONETOUCH VERIO TEST STRIPS Strp USE ONE STRIP FOR TESTING TWICE DAILY 200 each 3    RUTIN/HESP/BIOFLAV/C/[id number] (BIOFLEX ORAL) Take 1 tablet by mouth once daily at  "6am.      [DISCONTINUED] ciclopirox (PENLAC) 8 % Soln Apply topically nightly. 6.6 mL 11    [DISCONTINUED] HYDROcodone-acetaminophen (NORCO) 5-325 mg per tablet Take 1 tablet by mouth every 6 (six) hours as needed for Pain. 20 tablet 0    [DISCONTINUED] LIDOcaine (LIDODERM) 5 % Place 1 patch onto the skin once daily. Leave patch on for 12 hours then remove and do not place another patch to this area for at least 12 hours. 15 patch 0     No current facility-administered medications on file prior to visit.         Physical Exam:  Vitals:    09/13/23 1323   BP: 128/61   Pulse: 87   Temp: 97.7 °F (36.5 °C)   TempSrc: Temporal   Weight: 68.6 kg (151 lb 3.2 oz)   Height: 4' 11" (1.499 m)       Gen.: No acute distress  HEENT: sinuses nontender. Ears: Tympanic membranes intact.  No erythema or effusion.  Nose mild congestion.  Throat no significant erythema no exudate.  Mild postnasal drainage.  Neck supple no adenopathy  Chest: Clear to auscultation.  Normal respiratory effort.          "

## 2023-09-13 NOTE — TELEPHONE ENCOUNTER
No care due was identified.  [facility] Embedded Care Due Messages. Reference number: [id number].   9/13/2023 9:20:10 AM CDT

## 2023-09-13 NOTE — TELEPHONE ENCOUNTER
No care due was identified.  [facility] Embedded Care Due Messages. Reference number: [id number].   9/13/2023 11:20:09 AM CDT

## 2023-09-13 NOTE — TELEPHONE ENCOUNTER
Last fill date: 6/8/23  Last office visit: 5/11/23 with Dr. Soto  Next office visit: 11/14/23 with Dr. Soto

## 2023-10-03 DIAGNOSIS — N39.41 URINARY INCONTINENCE, URGE: ICD-10-CM

## 2023-10-03 RX ORDER — MIRABEGRON 50 MG/1
1 TABLET, FILM COATED, EXTENDED RELEASE ORAL DAILY
Qty: 90 TABLET | Refills: 2 | Status: SHIPPED | OUTPATIENT
Start: 2023-10-03

## 2023-10-03 NOTE — TELEPHONE ENCOUNTER
Care Due:                  Date            Visit Type   Department     Provider  --------------------------------------------------------------------------------                                EP -                              [facility]  Last Visit: 05-      CARE ([facility])   JOHNATHAN SEVILLA                              EP -                              PRIMARY      [facility]  Next Visit: 11-      CARE ([facility])   JOHNATHAN SEVILLA                                                            Last  Test          Frequency    Reason                     Performed    Due Date  --------------------------------------------------------------------------------    HBA1C.......  6 months...  metFORMIN................  05-   10-    [facility] Embedded Care Due Messages. Reference number: [id number].   10/03/2023 9:24:20 AM CDT

## 2023-10-03 NOTE — TELEPHONE ENCOUNTER
Refill Decision Note   Kelly LeJeune  is requesting a refill authorization.  Brief Assessment and Rationale for Refill:  Approve     Medication Therapy Plan:         Comments:     Note composed:1:03 PM 10/03/2023

## 2023-11-09 ENCOUNTER — LAB VISIT (OUTPATIENT)
Dept: LAB | Facility: HOSPITAL | Age: 88
End: 2023-11-09
Attending: FAMILY MEDICINE
Payer: MEDICARE

## 2023-11-09 DIAGNOSIS — E11.21 TYPE 2 DIABETES MELLITUS WITH DIABETIC NEPHROPATHY, WITHOUT LONG-TERM CURRENT USE OF INSULIN: ICD-10-CM

## 2023-11-09 DIAGNOSIS — E11.59 HYPERTENSION ASSOCIATED WITH DIABETES: ICD-10-CM

## 2023-11-09 DIAGNOSIS — E78.00 HYPERCHOLESTEROLEMIA: ICD-10-CM

## 2023-11-09 DIAGNOSIS — I15.2 HYPERTENSION ASSOCIATED WITH DIABETES: ICD-10-CM

## 2023-11-09 DIAGNOSIS — E87.5 HYPERKALEMIA: ICD-10-CM

## 2023-11-09 DIAGNOSIS — E78.5 TYPE 2 DIABETES MELLITUS WITH HYPERLIPIDEMIA: ICD-10-CM

## 2023-11-09 DIAGNOSIS — I10 PRIMARY HYPERTENSION: ICD-10-CM

## 2023-11-09 DIAGNOSIS — E11.69 TYPE 2 DIABETES MELLITUS WITH HYPERLIPIDEMIA: ICD-10-CM

## 2023-11-09 LAB
ALBUMIN/CREAT UR: 27.6 UG/MG (ref 0–30)
CREAT UR-MCNC: 58 MG/DL (ref 15–325)
MICROALBUMIN UR DL<=1MG/L-MCNC: 16 UG/ML

## 2023-11-09 PROCEDURE — 82570 ASSAY OF URINE CREATININE: CPT | Performed by: FAMILY MEDICINE

## 2023-11-14 ENCOUNTER — OFFICE VISIT (OUTPATIENT)
Dept: FAMILY MEDICINE | Facility: CLINIC | Age: 88
End: 2023-11-14
Payer: MEDICARE

## 2023-11-14 ENCOUNTER — PATIENT MESSAGE (OUTPATIENT)
Dept: FAMILY MEDICINE | Facility: CLINIC | Age: 88
End: 2023-11-14

## 2023-11-14 VITALS
HEIGHT: 59 IN | BODY MASS INDEX: 30.49 KG/M2 | SYSTOLIC BLOOD PRESSURE: 126 MMHG | OXYGEN SATURATION: 98 % | HEART RATE: 75 BPM | WEIGHT: 151.25 LBS | DIASTOLIC BLOOD PRESSURE: 70 MMHG

## 2023-11-14 DIAGNOSIS — E78.5 TYPE 2 DIABETES MELLITUS WITH HYPERLIPIDEMIA: ICD-10-CM

## 2023-11-14 DIAGNOSIS — E11.21 TYPE 2 DIABETES MELLITUS WITH DIABETIC NEPHROPATHY, WITHOUT LONG-TERM CURRENT USE OF INSULIN: ICD-10-CM

## 2023-11-14 DIAGNOSIS — E11.69 TYPE 2 DIABETES MELLITUS WITH HYPERLIPIDEMIA: ICD-10-CM

## 2023-11-14 DIAGNOSIS — I15.2 HYPERTENSION ASSOCIATED WITH DIABETES: Primary | ICD-10-CM

## 2023-11-14 DIAGNOSIS — D64.9 ANEMIA, UNSPECIFIED TYPE: ICD-10-CM

## 2023-11-14 DIAGNOSIS — N18.32 STAGE 3B CHRONIC KIDNEY DISEASE: ICD-10-CM

## 2023-11-14 DIAGNOSIS — E78.00 HYPERCHOLESTEROLEMIA: ICD-10-CM

## 2023-11-14 DIAGNOSIS — E66.9 OBESITY, CLASS I, BMI 30-34.9: ICD-10-CM

## 2023-11-14 DIAGNOSIS — E11.59 HYPERTENSION ASSOCIATED WITH DIABETES: Primary | ICD-10-CM

## 2023-11-14 DIAGNOSIS — R14.3 EXCESSIVE FLATUS: ICD-10-CM

## 2023-11-14 PROCEDURE — 1160F PR REVIEW ALL MEDS BY PRESCRIBER/CLIN PHARMACIST DOCUMENTED: ICD-10-PCS | Mod: CPTII,S$GLB,, | Performed by: FAMILY MEDICINE

## 2023-11-14 PROCEDURE — 99214 PR OFFICE/OUTPT VISIT, EST, LEVL IV, 30-39 MIN: ICD-10-PCS | Mod: S$GLB,,, | Performed by: FAMILY MEDICINE

## 2023-11-14 PROCEDURE — 1101F PT FALLS ASSESS-DOCD LE1/YR: CPT | Mod: CPTII,S$GLB,, | Performed by: FAMILY MEDICINE

## 2023-11-14 PROCEDURE — 1159F MED LIST DOCD IN RCRD: CPT | Mod: CPTII,S$GLB,, | Performed by: FAMILY MEDICINE

## 2023-11-14 PROCEDURE — 1101F PR PT FALLS ASSESS DOC 0-1 FALLS W/OUT INJ PAST YR: ICD-10-PCS | Mod: CPTII,S$GLB,, | Performed by: FAMILY MEDICINE

## 2023-11-14 PROCEDURE — 99214 OFFICE O/P EST MOD 30 MIN: CPT | Mod: S$GLB,,, | Performed by: FAMILY MEDICINE

## 2023-11-14 PROCEDURE — 1126F AMNT PAIN NOTED NONE PRSNT: CPT | Mod: CPTII,S$GLB,, | Performed by: FAMILY MEDICINE

## 2023-11-14 PROCEDURE — 3288F FALL RISK ASSESSMENT DOCD: CPT | Mod: CPTII,S$GLB,, | Performed by: FAMILY MEDICINE

## 2023-11-14 PROCEDURE — 99999 PR PBB SHADOW E&M-EST. PATIENT-LVL IV: ICD-10-PCS | Mod: PBBFAC,,, | Performed by: FAMILY MEDICINE

## 2023-11-14 PROCEDURE — 1160F RVW MEDS BY RX/DR IN RCRD: CPT | Mod: CPTII,S$GLB,, | Performed by: FAMILY MEDICINE

## 2023-11-14 PROCEDURE — 1159F PR MEDICATION LIST DOCUMENTED IN MEDICAL RECORD: ICD-10-PCS | Mod: CPTII,S$GLB,, | Performed by: FAMILY MEDICINE

## 2023-11-14 PROCEDURE — 99999 PR PBB SHADOW E&M-EST. PATIENT-LVL IV: CPT | Mod: PBBFAC,,, | Performed by: FAMILY MEDICINE

## 2023-11-14 PROCEDURE — 1126F PR PAIN SEVERITY QUANTIFIED, NO PAIN PRESENT: ICD-10-PCS | Mod: CPTII,S$GLB,, | Performed by: FAMILY MEDICINE

## 2023-11-14 PROCEDURE — 3288F PR FALLS RISK ASSESSMENT DOCUMENTED: ICD-10-PCS | Mod: CPTII,S$GLB,, | Performed by: FAMILY MEDICINE

## 2023-11-14 RX ORDER — REPAGLINIDE 1 MG/1
1 TABLET ORAL
Qty: 270 TABLET | Refills: 3 | Status: SHIPPED | OUTPATIENT
Start: 2023-11-14 | End: 2024-11-13

## 2023-11-14 NOTE — PROGRESS NOTES
"Subjective:       Patient ID: Dolores B LeJeune is a 88 y.o. female.    Chief Complaint: Annual Exam    Pt is known to me.  Pt is here for followup of chronic medical issues.  We reviewed recent labs.  HbA1c is up to 8.0.  She attributes to recent anxiety--she feels "nervous inside" a lot recently.  She is coping with her 's death better.  She says she has "always been nervous."  She does not want a medication.  She says that she can control it.  Her GFR is 36.  She is on no NSAIDs.  She admits that she does not drink enough water.  She is also a bit more anemic than is her usual.  She thinks it is due to decreased appetite.  She has seen no blood in her stools nor dark tarry stools.  Discussed health maintenance with pt and will schedule appropriate studies and/or immunizations.        Review of Systems   Constitutional:  Negative for activity change, appetite change, fatigue and unexpected weight change.   Eyes:  Negative for visual disturbance.   Respiratory:  Negative for cough, chest tightness and shortness of breath.    Cardiovascular:  Negative for chest pain, palpitations and leg swelling.   Gastrointestinal:  Negative for abdominal pain, constipation, diarrhea, nausea and vomiting.        Gas   Endocrine: Negative for cold intolerance, heat intolerance and polyuria.   Genitourinary:  Positive for enuresis (when she holds her urine). Negative for decreased urine volume, dysuria and urgency.   Musculoskeletal:  Positive for arthralgias and myalgias. Negative for back pain.   Skin:  Negative for rash.   Neurological:  Negative for numbness and headaches.   Psychiatric/Behavioral:  Negative for confusion and sleep disturbance.        Objective:      Physical Exam  Vitals and nursing note reviewed.   Constitutional:       Appearance: She is well-developed.   HENT:      Head: Normocephalic.   Eyes:      Conjunctiva/sclera: Conjunctivae normal.      Pupils: Pupils are equal, round, and reactive to light. "   Neck:      Thyroid: No thyromegaly.   Cardiovascular:      Rate and Rhythm: Normal rate and regular rhythm.      Pulses:           Dorsalis pedis pulses are 2+ on the right side and 2+ on the left side.        Posterior tibial pulses are 2+ on the right side and 2+ on the left side.      Heart sounds: Normal heart sounds.   Pulmonary:      Effort: Pulmonary effort is normal.      Breath sounds: Normal breath sounds.   Abdominal:      General: Bowel sounds are normal.      Palpations: Abdomen is soft.      Tenderness: There is no abdominal tenderness.   Musculoskeletal:         General: No tenderness or deformity. Normal range of motion.      Cervical back: Normal range of motion and neck supple.      Right lower leg: Edema (mild) present.      Left lower leg: Edema (mild) present.      Right foot: Normal range of motion. No deformity.      Left foot: Normal range of motion. No deformity.   Feet:      Right foot:      Protective Sensation: 7 sites tested.  7 sites sensed.      Skin integrity: No ulcer.      Left foot:      Protective Sensation: 7 sites tested.  7 sites sensed.      Skin integrity: No ulcer.   Lymphadenopathy:      Cervical: No cervical adenopathy.   Skin:     General: Skin is warm and dry.   Neurological:      Mental Status: She is alert and oriented to person, place, and time.      Cranial Nerves: No cranial nerve deficit.      Motor: No abnormal muscle tone.      Coordination: Coordination normal.      Deep Tendon Reflexes: Reflexes normal.   Psychiatric:         Behavior: Behavior normal.         Assessment:       1. Hypertension associated with diabetes    2. Hypercholesterolemia    3. Stage 3b chronic kidney disease    4. Type 2 diabetes mellitus with hyperlipidemia    5. Type 2 diabetes mellitus with diabetic nephropathy, without long-term current use of insulin    6. Obesity, Class I, BMI 30-34.9    7. Excessive flatus    8. Anemia, unspecified type        Plan:       Kelly was seen today  for annual exam.    Diagnoses and all orders for this visit:    Hypertension associated with diabetes    Hypercholesterolemia    Stage 3b chronic kidney disease  -     Comprehensive Metabolic Panel; Future    Type 2 diabetes mellitus with hyperlipidemia  -     repaglinide (PRANDIN) 1 MG tablet; Take 1 tablet (1 mg total) by mouth 3 (three) times daily before meals.  -     Ambulatory referral/consult to Optometry; Future  -     Hemoglobin A1C; Future    Type 2 diabetes mellitus with diabetic nephropathy, without long-term current use of insulin    Obesity, Class I, BMI 30-34.9    Excessive flatus  Comments:  Pt to try OTC digestive enzymes with every meal    Anemia, unspecified type  -     CBC Auto Differential; Future      During this visit, I reviewed the pt's history, medications, allergies, and problem list.

## 2023-11-15 ENCOUNTER — OFFICE VISIT (OUTPATIENT)
Dept: PODIATRY | Facility: CLINIC | Age: 88
End: 2023-11-15
Payer: MEDICARE

## 2023-11-15 VITALS — WEIGHT: 151.25 LBS | BODY MASS INDEX: 30.49 KG/M2 | HEIGHT: 59 IN

## 2023-11-15 DIAGNOSIS — B35.1 ONYCHOMYCOSIS DUE TO DERMATOPHYTE: ICD-10-CM

## 2023-11-15 DIAGNOSIS — I87.2 VENOUS INSUFFICIENCY (CHRONIC) (PERIPHERAL): Primary | ICD-10-CM

## 2023-11-15 DIAGNOSIS — I73.9 PVD (PERIPHERAL VASCULAR DISEASE): ICD-10-CM

## 2023-11-15 PROCEDURE — 99999 PR PBB SHADOW E&M-EST. PATIENT-LVL II: ICD-10-PCS | Mod: PBBFAC,,, | Performed by: PODIATRIST

## 2023-11-15 PROCEDURE — 99499 UNLISTED E&M SERVICE: CPT | Mod: S$GLB,,, | Performed by: PODIATRIST

## 2023-11-15 PROCEDURE — 99499 NO LOS: ICD-10-PCS | Mod: S$GLB,,, | Performed by: PODIATRIST

## 2023-11-15 PROCEDURE — 99999 PR PBB SHADOW E&M-EST. PATIENT-LVL II: CPT | Mod: PBBFAC,,, | Performed by: PODIATRIST

## 2023-11-15 PROCEDURE — 11721 PR DEBRIDEMENT OF NAILS, 6 OR MORE: ICD-10-PCS | Mod: Q9,S$GLB,, | Performed by: PODIATRIST

## 2023-11-15 PROCEDURE — 11721 DEBRIDE NAIL 6 OR MORE: CPT | Mod: Q9,S$GLB,, | Performed by: PODIATRIST

## 2023-11-16 NOTE — PROGRESS NOTES
Subjective:      Patient ID: Dolores B LeJeune is a 88 y.o. female.    Chief Complaint: Peripheral Vascular Disease      Kelly is a 88 y.o. female witha past medical history of Acute MI, Anticoagulant long-term use, Carpal tunnel syndrome, Cataracts, bilateral, Colon polyps, Coronary artery disease, DM type 2 (diabetes mellitus, type 2), HTN (hypertension), Hyperlipidemia, Osteoarthritis, and Peripheral neuropathy. The patient's chief complaint consists of toenails that are in need of trimming.  Denies experiencing pain from the nails with today's exam.  Has not attempted to self treat.  Denies symptoms of claudication or rest pain.  Denies noticing venous stasis dermatitis or venous blistering.  Attempts to apply moisturizer to the legs once daily.  Denies any additional pedal complaints.      PCP: Silvia Soto MD  Last visit: 11/23  Past Medical History:   Diagnosis Date    Acute MI     Anticoagulant long-term use     Carpal tunnel syndrome     Cataracts, bilateral     Colon polyps     Coronary artery disease     DM type 2 (diabetes mellitus, type 2)     HTN (hypertension)     Hyperlipidemia     Osteoarthritis     Peripheral neuropathy        Past Surgical History:   Procedure Laterality Date    CAROTID STENT      1    COLONOSCOPY  1/8/2008  Gurvinder    A 3 mm by 6 mm polyp in the cecum.  FRAGMENTS OF TUBULAR ADENOMA.    A 1 mm polyp in the recto-sigmoid colon.  HYPERPLASTIC POLYP.    Extremely redundant colon.      CORONARY ARTERY BYPASS GRAFT  01/24/2018    ST, Dr. Echols. LIMA to LAD, SVG to OM1, SVG to OM2, SVG to PDA    EYE SURGERY      eye surgery    HYSTERECTOMY      ovaries remain    ptyregium      TONSILLECTOMY         Family History   Problem Relation Age of Onset    Tremor Mother     Hypertension Mother     Heart disease Mother     Stroke Father        Social History     Socioeconomic History    Marital status:     Number of children: 4   Tobacco Use    Smoking status: Former     Current  packs/day: 0.00     Average packs/day: 0.5 packs/day for 16.2 years (8.1 ttl pk-yrs)     Types: Cigarettes     Start date:      Quit date: 3/15/1972     Years since quittin.7    Smokeless tobacco: Never   Substance and Sexual Activity    Alcohol use: No    Drug use: No       Current Outpatient Medications   Medication Sig Dispense Refill    ACCU-CHEK SOFTCLIX LANCETS [facility] ONE AS DIRECTED TWICE DAILY 200 each 10    aspirin (ECOTRIN) 81 MG EC tablet Take 81 mg by mouth once daily.      atorvastatin (LIPITOR) 40 MG tablet Take 1 tablet (40 mg total) by mouth once daily. 90 tablet 1    CALCIUM CARBONATE/VITAMIN D3 (CALCIUM 500 WITH D ORAL) Take by mouth.      clopidogreL (PLAVIX) 75 mg tablet TAKE  ONE TABLET BY MOUTH DAILY 90 tablet 1    DEXTRAN 70/HYPROMELLOSE (ARTIFICIAL TEARS OPHT) Apply to eye.      gabapentin (NEURONTIN) 300 MG capsule TAKE ONE CAPSULE BY MOUTH DAILY 90 capsule 3    loratadine (CLARITIN) 10 mg tablet Take 1 tablet (10 mg total) by mouth once daily. 30 tablet     losartan (COZAAR) 25 MG tablet TAKE 1/2 TABLET BY MOUTH IN THE EVENING 45 tablet 1    meclizine (ANTIVERT) 25 mg tablet Take 1 tablet by mouth Three times daily as needed.      metFORMIN (GLUCOPHAGE) 500 MG tablet Take 1/2 tab twice a day with food 90 tablet 1    metoprolol succinate (TOPROL-XL) 25 MG 24 hr tablet TAKE ONE-HALF TABLET BY MOUTH DAILY 45 tablet 1    mirabegron (MYRBETRIQ) 50 mg Tb24 Take 1 tablet (50 mg total) by mouth once daily. 90 tablet 2    ONETOUCH DELICA LANCETS 33 gauge Misc       ONETOUCH VERIO TEST STRIPS Strp USE ONE STRIP FOR TESTING TWICE DAILY 200 each 3    pantoprazole (PROTONIX) 40 MG tablet Take 40 mg by mouth once daily.      repaglinide (PRANDIN) 1 MG tablet Take 1 tablet (1 mg total) by mouth 3 (three) times daily before meals. 270 tablet 3    RUTIN/HESP/BIOFLAV/C/[id number] (BIOFLEX ORAL) Take 1 tablet by mouth once daily at 6am.       No current facility-administered medications for this  visit.       Review of patient's allergies indicates:   Allergen Reactions    Albuterol Other (See Comments)     Palpitations/tremor      Sulfa (sulfonamide antibiotics) Rash       Hemoglobin A1C   Date Value Ref Range Status   11/09/2023 8.0 (H) 4.0 - 5.6 % Final     Comment:     ADA Screening Guidelines:  5.7-6.4%  Consistent with prediabetes  >or=6.5%  Consistent with diabetes    High levels of fetal hemoglobin interfere with the HbA1C  assay. Heterozygous hemoglobin variants (HbS, HgC, etc)do  not significantly interfere with this assay.   However, presence of multiple variants may affect accuracy.     05/02/2023 7.4 (H) 4.0 - 5.6 % Final     Comment:     ADA Screening Guidelines:  5.7-6.4%  Consistent with prediabetes  >or=6.5%  Consistent with diabetes    High levels of fetal hemoglobin interfere with the HbA1C  assay. Heterozygous hemoglobin variants (HbS, HgC, etc)do  not significantly interfere with this assay.   However, presence of multiple variants may affect accuracy.     11/01/2022 7.3 (H) 4.0 - 5.6 % Final     Comment:     ADA Screening Guidelines:  5.7-6.4%  Consistent with prediabetes  >or=6.5%  Consistent with diabetes    High levels of fetal hemoglobin interfere with the HbA1C  assay. Heterozygous hemoglobin variants (HbS, HgC, etc)do  not significantly interfere with this assay.   However, presence of multiple variants may affect accuracy.         Review of Systems   Constitutional: Negative for chills and fever.   Cardiovascular:  Positive for leg swelling. Negative for claudication.   Skin:  Positive for color change and nail changes.   Musculoskeletal:  Positive for joint swelling. Negative for joint pain, muscle cramps and muscle weakness.   Gastrointestinal:  Negative for nausea and vomiting.   Neurological:  Negative for numbness and paresthesias.   Psychiatric/Behavioral:  Negative for altered mental status.            Objective:      Physical Exam  Constitutional:       Appearance: Normal  appearance. She is not ill-appearing.   Cardiovascular:      Pulses:           Dorsalis pedis pulses are 1+ on the right side and 1+ on the left side.        Posterior tibial pulses are 1+ on the right side and 1+ on the left side.      Comments: CFT is 3 seconds bilateral.  Pedal hair growth is absent bilateral.  Varicosities noted bilateral.  Moderate non pitting lower extremity edema noted bilateral.  Toes are cool to touch bilateral.    Musculoskeletal:         General: Swelling present. No tenderness or signs of injury.      Comments: Muscle strength 5/5 in all muscle groups bilateral.  No tenderness nor crepitation with ROM of foot/ankle joints bilateral.  (-) for pain with palpation of the medial and lateral borders of bilateral hallux toenail.    Skin:     General: Skin is warm and dry.      Capillary Refill: Capillary refill takes more than 3 seconds.      Findings: No bruising, ecchymosis, erythema, signs of injury, laceration, lesion, petechiae, rash or wound.      Comments: Increased pedal turgor noted bilateral.  Toenails x 10 appear thickened by 2 mm, elongated by 6 mm, and discolored with subungual debris.     Neurological:      General: No focal deficit present.      Mental Status: She is alert.      Sensory: No sensory deficit.      Motor: No weakness or atrophy.      Comments: Light touch is intact bilateral.                 Assessment:       Encounter Diagnoses   Name Primary?    Venous insufficiency (chronic) (peripheral) Yes    PVD (peripheral vascular disease)     Onychomycosis due to dermatophyte            Plan:       Kelly was seen today for peripheral vascular disease.    Diagnoses and all orders for this visit:    Venous insufficiency (chronic) (peripheral)    PVD (peripheral vascular disease)    Onychomycosis due to dermatophyte        I counseled the patient on her conditions, their implications and medical management.    Stable PVD and venous insufficiency of the lower extremities at  this time.    Advised to inspect the LE's daily for signs of stasis dermatitis and venous ulceration.      Patient instructed on proper foot hygeine. We discussed wearing proper shoe gear, daily foot inspections, never walking without protective shoe gear, never putting sharp instruments to feet    With patient's permission, nails were aggressively reduced and debrided x 10 to their soft tissue attachment mechanically and with electric , removing all offending nail and debris. Patient relates relief following the procedure. She will continue to monitor the areas daily, inspect her feet, wear protective shoe gear when ambulatory, moisturizer to maintain skin integrity and follow in this office in approximately 3 months, sooner p.r.n.    RTC for routine high risk exam in three months.      Lucian Fernandez DPM

## 2023-12-13 ENCOUNTER — LAB VISIT (OUTPATIENT)
Dept: LAB | Facility: HOSPITAL | Age: 88
End: 2023-12-13
Attending: INTERNAL MEDICINE
Payer: MEDICARE

## 2023-12-13 DIAGNOSIS — I25.708 CORONARY ARTERY DISEASE OF BYPASS GRAFT OF NATIVE HEART WITH STABLE ANGINA PECTORIS: Chronic | ICD-10-CM

## 2023-12-13 DIAGNOSIS — E78.00 HYPERCHOLESTEROLEMIA: Chronic | ICD-10-CM

## 2023-12-13 LAB
CHOLEST SERPL-MCNC: 116 MG/DL (ref 120–199)
CHOLEST/HDLC SERPL: 3.3 {RATIO} (ref 2–5)
HDLC SERPL-MCNC: 35 MG/DL (ref 40–75)
HDLC SERPL: 30.2 % (ref 20–50)
LDLC SERPL CALC-MCNC: 61.8 MG/DL (ref 63–159)
NONHDLC SERPL-MCNC: 81 MG/DL
TRIGL SERPL-MCNC: 96 MG/DL (ref 30–150)

## 2023-12-13 PROCEDURE — 80061 LIPID PANEL: CPT | Performed by: INTERNAL MEDICINE

## 2023-12-13 PROCEDURE — 36415 COLL VENOUS BLD VENIPUNCTURE: CPT | Mod: PO | Performed by: INTERNAL MEDICINE

## 2023-12-19 ENCOUNTER — OFFICE VISIT (OUTPATIENT)
Dept: CARDIOLOGY | Facility: CLINIC | Age: 88
End: 2023-12-19
Payer: MEDICARE

## 2023-12-19 VITALS
WEIGHT: 144.81 LBS | HEIGHT: 59 IN | SYSTOLIC BLOOD PRESSURE: 137 MMHG | HEART RATE: 71 BPM | DIASTOLIC BLOOD PRESSURE: 69 MMHG | BODY MASS INDEX: 29.19 KG/M2

## 2023-12-19 DIAGNOSIS — I25.5 ISCHEMIC CARDIOMYOPATHY: Chronic | ICD-10-CM

## 2023-12-19 DIAGNOSIS — E11.8 TYPE 2 DIABETES MELLITUS WITH COMPLICATION, WITHOUT LONG-TERM CURRENT USE OF INSULIN: Chronic | ICD-10-CM

## 2023-12-19 DIAGNOSIS — E78.00 HYPERCHOLESTEROLEMIA: Chronic | ICD-10-CM

## 2023-12-19 DIAGNOSIS — I25.708 CORONARY ARTERY DISEASE OF BYPASS GRAFT OF NATIVE HEART WITH STABLE ANGINA PECTORIS: Primary | Chronic | ICD-10-CM

## 2023-12-19 DIAGNOSIS — I15.2 HYPERTENSION ASSOCIATED WITH DIABETES: Chronic | ICD-10-CM

## 2023-12-19 DIAGNOSIS — E11.59 HYPERTENSION ASSOCIATED WITH DIABETES: Chronic | ICD-10-CM

## 2023-12-19 DIAGNOSIS — I34.0 NONRHEUMATIC MITRAL VALVE REGURGITATION: Chronic | ICD-10-CM

## 2023-12-19 PROBLEM — E66.3 OVERWEIGHT (BMI 25.0-29.9): Status: ACTIVE | Noted: 2022-12-13

## 2023-12-19 PROCEDURE — 3288F PR FALLS RISK ASSESSMENT DOCUMENTED: ICD-10-PCS | Mod: CPTII,S$GLB,, | Performed by: INTERNAL MEDICINE

## 2023-12-19 PROCEDURE — 1159F MED LIST DOCD IN RCRD: CPT | Mod: CPTII,S$GLB,, | Performed by: INTERNAL MEDICINE

## 2023-12-19 PROCEDURE — 99214 PR OFFICE/OUTPT VISIT, EST, LEVL IV, 30-39 MIN: ICD-10-PCS | Mod: S$GLB,,, | Performed by: INTERNAL MEDICINE

## 2023-12-19 PROCEDURE — 99999 PR PBB SHADOW E&M-EST. PATIENT-LVL III: ICD-10-PCS | Mod: PBBFAC,,, | Performed by: INTERNAL MEDICINE

## 2023-12-19 PROCEDURE — 99999 PR PBB SHADOW E&M-EST. PATIENT-LVL III: CPT | Mod: PBBFAC,,, | Performed by: INTERNAL MEDICINE

## 2023-12-19 PROCEDURE — 1101F PT FALLS ASSESS-DOCD LE1/YR: CPT | Mod: CPTII,S$GLB,, | Performed by: INTERNAL MEDICINE

## 2023-12-19 PROCEDURE — 1126F AMNT PAIN NOTED NONE PRSNT: CPT | Mod: CPTII,S$GLB,, | Performed by: INTERNAL MEDICINE

## 2023-12-19 PROCEDURE — 3288F FALL RISK ASSESSMENT DOCD: CPT | Mod: CPTII,S$GLB,, | Performed by: INTERNAL MEDICINE

## 2023-12-19 PROCEDURE — 99214 OFFICE O/P EST MOD 30 MIN: CPT | Mod: S$GLB,,, | Performed by: INTERNAL MEDICINE

## 2023-12-19 PROCEDURE — 1159F PR MEDICATION LIST DOCUMENTED IN MEDICAL RECORD: ICD-10-PCS | Mod: CPTII,S$GLB,, | Performed by: INTERNAL MEDICINE

## 2023-12-19 PROCEDURE — 1101F PR PT FALLS ASSESS DOC 0-1 FALLS W/OUT INJ PAST YR: ICD-10-PCS | Mod: CPTII,S$GLB,, | Performed by: INTERNAL MEDICINE

## 2023-12-19 PROCEDURE — 1126F PR PAIN SEVERITY QUANTIFIED, NO PAIN PRESENT: ICD-10-PCS | Mod: CPTII,S$GLB,, | Performed by: INTERNAL MEDICINE

## 2023-12-19 RX ORDER — ATORVASTATIN CALCIUM 40 MG/1
40 TABLET, FILM COATED ORAL DAILY
Qty: 90 TABLET | Refills: 1 | Status: SHIPPED | OUTPATIENT
Start: 2023-12-19

## 2023-12-19 NOTE — PROGRESS NOTES
Subjective:    Patient ID:  Dolores B LeJeune is a 88 y.o. female who presents for Follow-up        HPI  DISCUSSED LABS AND GOALS LDL 61 HDL 35, TRIGLYCERIDE 96, COLD SX, OTC MEDS, LAST HBA1C 8 %, WAS DOING OK EXCEPT FOR COLD, DIABETES MEDICINE CHANGE NOT TOLERATE NEW MEDICINE, SEE ROS    Past Medical History:   Diagnosis Date    Acute MI     Anticoagulant long-term use     Carpal tunnel syndrome     Cataracts, bilateral     Colon polyps     Coronary artery disease     DM type 2 (diabetes mellitus, type 2)     HTN (hypertension)     Hyperlipidemia     Osteoarthritis     Peripheral neuropathy      Past Surgical History:   Procedure Laterality Date    CAROTID STENT      1    COLONOSCOPY  2008  [facility]    A 3 mm by 6 mm polyp in the cecum.  FRAGMENTS OF TUBULAR ADENOMA.    A 1 mm polyp in the recto-sigmoid colon.  HYPERPLASTIC POLYP.    Extremely redundant colon.      CORONARY ARTERY BYPASS GRAFT  2018    [facility], Dr. Echols. LIMA to LAD, SVG to OM1, SVG to OM2, SVG to PDA    EYE SURGERY      eye surgery    HYSTERECTOMY      ovaries remain    ptyregium      TONSILLECTOMY       Family History   Problem Relation Age of Onset    Tremor Mother     Hypertension Mother     Heart disease Mother     Stroke Father      Social History     Socioeconomic History    Marital status:     Number of children: 4   Tobacco Use    Smoking status: Former     Current packs/day: 0.00     Average packs/day: 0.5 packs/day for 16.2 years (8.1 ttl pk-yrs)     Types: Cigarettes     Start date:      Quit date: 3/15/1972     Years since quittin.7    Smokeless tobacco: Never   Substance and Sexual Activity    Alcohol use: No    Drug use: No       Review of patient's allergies indicates:   Allergen Reactions    Albuterol Other (See Comments)     Palpitations/tremor      Sulfa (sulfonamide antibiotics) Rash       Current Outpatient Medications:     ACCU-CHEK SOFTCLIX LANCETS Misc, USE ONE AS DIRECTED TWICE DAILY, Disp: 200 each,  Rfl: 10    aspirin (ECOTRIN) 81 MG EC tablet, Take 81 mg by mouth once daily., Disp: , Rfl:     CALCIUM CARBONATE/VITAMIN D3 (CALCIUM 500 WITH D ORAL), Take by mouth., Disp: , Rfl:     clopidogreL (PLAVIX) 75 mg tablet, TAKE  ONE TABLET BY MOUTH DAILY, Disp: 90 tablet, Rfl: 1    DEXTRAN 70/HYPROMELLOSE (ARTIFICIAL TEARS OPHT), Apply to eye., Disp: , Rfl:     gabapentin (NEURONTIN) 300 MG capsule, TAKE ONE CAPSULE BY MOUTH DAILY, Disp: 90 capsule, Rfl: 3    loratadine (CLARITIN) 10 mg tablet, Take 1 tablet (10 mg total) by mouth once daily., Disp: 30 tablet, Rfl:     losartan (COZAAR) 25 MG tablet, TAKE 1/2 TABLET BY MOUTH IN THE EVENING, Disp: 45 tablet, Rfl: 1    meclizine (ANTIVERT) 25 mg tablet, Take 1 tablet by mouth Three times daily as needed., Disp: , Rfl:     metFORMIN (GLUCOPHAGE) 500 MG tablet, Take 1/2 tab twice a day with food, Disp: 90 tablet, Rfl: 1    metoprolol succinate (TOPROL-XL) 25 MG 24 hr tablet, TAKE ONE-HALF TABLET BY MOUTH DAILY, Disp: 45 tablet, Rfl: 1    mirabegron (MYRBETRIQ) 50 mg Tb24, Take 1 tablet (50 mg total) by mouth once daily., Disp: 90 tablet, Rfl: 2    ONETOUCH DELICA LANCETS 33 gauge Misc, , Disp: , Rfl:     ONETOUCH VERIO TEST STRIPS Strp, USE ONE STRIP FOR TESTING TWICE DAILY, Disp: 200 each, Rfl: 3    pantoprazole (PROTONIX) 40 MG tablet, Take 40 mg by mouth once daily., Disp: , Rfl:     RUTIN/HESP/BIOFLAV/C/[id number] (BIOFLEX ORAL), Take 1 tablet by mouth once daily at 6am., Disp: , Rfl:     atorvastatin (LIPITOR) 40 MG tablet, Take 1 tablet (40 mg total) by mouth once daily., Disp: 90 tablet, Rfl: 1    repaglinide (PRANDIN) 1 MG tablet, Take 1 tablet (1 mg total) by mouth 3 (three) times daily before meals., Disp: 270 tablet, Rfl: 3    Review of Systems   Constitutional: Negative for chills, diaphoresis, fever, malaise/fatigue, night sweats (SOME) and weight loss.   HENT:  Positive for congestion. Negative for nosebleeds.    Eyes:  Negative for blurred vision and visual  "disturbance.   Cardiovascular:  Negative for chest pain, claudication, cyanosis, dyspnea on exertion (MINIMAL), irregular heartbeat, leg swelling (OCC), near-syncope, orthopnea, palpitations, paroxysmal nocturnal dyspnea and syncope.   Respiratory:  Positive for cough. Negative for hemoptysis, shortness of breath and wheezing.    Endocrine: Negative for polydipsia and polyuria.   Hematologic/Lymphatic: Negative for adenopathy. Does not bruise/bleed easily.   Skin:  Negative for itching and rash.   Musculoskeletal:  Negative for back pain and falls.   Gastrointestinal:  Negative for abdominal pain, change in bowel habit, jaundice, melena and nausea.   Genitourinary:  Negative for dysuria and flank pain.   Neurological:  Negative for brief paralysis, focal weakness, light-headedness, loss of balance, paresthesias, tremors and weakness.   Psychiatric/Behavioral:  Negative for altered mental status and depression.    Allergic/Immunologic: Negative for persistent infections.        Objective:      Vitals:    12/19/23 1322   BP: 137/69   Pulse: 71   Weight: 65.7 kg (144 lb 13.5 oz)   Height: 4' 11" (1.499 m)   PainSc: 0-No pain     Body mass index is 29.25 kg/m².    Physical Exam  HENT:      Head: Normocephalic and atraumatic.   Eyes:      Extraocular Movements: Extraocular movements intact.      Conjunctiva/sclera: Conjunctivae normal.   Cardiovascular:      Rate and Rhythm: No extrasystoles are present.     Pulses:           Carotid pulses are 2+ on the right side and 2+ on the left side.       Radial pulses are 2+ on the right side and 2+ on the left side.      Heart sounds: Murmur heard.      Systolic murmur is present with a grade of 1/6 at the lower left sternal border.      No friction rub. No gallop.   Abdominal:      Tenderness: There is no abdominal tenderness.   Musculoskeletal:      Cervical back: Neck supple.   Skin:     Capillary Refill: Capillary refill takes less than 2 seconds.   Neurological:      " General: No focal deficit present.      Mental Status: She is alert and oriented to person, place, and time.   Psychiatric:         Mood and Affect: Mood normal.         Speech: Speech normal.         Behavior: Behavior normal.                 ..    Chemistry        Component Value Date/Time     11/09/2023 0848    K 4.5 11/09/2023 0848     11/09/2023 0848    CO2 26 11/09/2023 0848    BUN 17 11/09/2023 0848    CREATININE 1.4 11/09/2023 0848     (H) 11/09/2023 0848        Component Value Date/Time    CALCIUM 9.5 11/09/2023 0848    ALKPHOS 76 11/09/2023 0848    AST 23 11/09/2023 0848    ALT 16 11/09/2023 0848    BILITOT 0.5 11/09/2023 0848    ESTGFRAFRICA 43.2 (A) 02/24/2022 0918    EGFRNONAA 37.5 (A) 02/24/2022 0918            ..  Lab Results   Component Value Date    CHOL 116 (L) 12/13/2023    CHOL 106 (L) 11/09/2023    CHOL 125 11/01/2022     Lab Results   Component Value Date    HDL 35 (L) 12/13/2023    HDL 37 (L) 11/09/2023    HDL 41 11/01/2022     Lab Results   Component Value Date    LDLCALC 61.8 (L) 12/13/2023    LDLCALC 53.0 (L) 11/09/2023    LDLCALC 65.8 11/01/2022     Lab Results   Component Value Date    TRIG 96 12/13/2023    TRIG 80 11/09/2023    TRIG 91 11/01/2022     Lab Results   Component Value Date    CHOLHDL 30.2 12/13/2023    CHOLHDL 34.9 11/09/2023    CHOLHDL 32.8 11/01/2022     ..  Lab Results   Component Value Date    WBC 8.77 11/09/2023    HGB 10.4 (L) 11/09/2023    HCT 32.6 (L) 11/09/2023    MCV 96 11/09/2023     11/09/2023       Test(s) Reviewed  I have reviewed the following in detail:  [] Stress test   [] Angiography   [] Echocardiogram   [x] Labs   [] Other:       Assessment:         ICD-10-CM ICD-9-CM   1. 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 Nonrheumatic mitral valve regurgitation  I34.0 424.0   3. Ischemic cardiomyopathy  I25.5 414.8   4. Hypertension associated with diabetes  E11.59  250.80    I15.2 401.9   5. Type 2 diabetes mellitus with complication, without long-term current use of insulin  E11.8 250.90   6. 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 - Primary    Ischemic cardiomyopathy    Nonrheumatic mitral valve regurgitation       Endocrine    Type 2 diabetes mellitus with complication, without long-term current use of insulin        Plan:         HBP, OVER THE COUNTER MEDS ONLY,ALL CV CLINICALLY STABLE, CLASS 1 ANGINA, NO HF, NO TIA, NO CLINICAL ARRHYTHMIA,CONTINUE CURRENT MEDS, EDUCATION, DIET, EXERCISE AS MUCH AS POSSIBLE, NEEDS BETTER DIABETES CONTROL DISCUSSED PLAN WITH THE PATIENT AND HER SON, RETURN TO CLINIC IN 6 MO  Coronary artery disease 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  Dispense: 90 tablet; Refill: 1    RTC Low level/low impact aerobic exercise 5x's/wk. Heart healthy diet and risk factor modification.    See labs and med orders.    Aerobic exercise 5x's/wk. Heart healthy diet and risk factor modification.    See labs and med orders.

## 2024-01-03 DIAGNOSIS — I10 PRIMARY HYPERTENSION: Primary | ICD-10-CM

## 2024-01-03 RX ORDER — LOSARTAN POTASSIUM 25 MG/1
TABLET ORAL
Qty: 45 TABLET | Refills: 1 | Status: SHIPPED | OUTPATIENT
Start: 2024-01-03

## 2024-01-22 RX ORDER — GABAPENTIN 300 MG/1
300 CAPSULE ORAL
Qty: 90 CAPSULE | Refills: 3 | Status: SHIPPED | OUTPATIENT
Start: 2024-01-22

## 2024-01-22 NOTE — TELEPHONE ENCOUNTER
No care due was identified.  [facility] Embedded Care Due Messages. Reference number: [id number].   1/22/2024 12:17:56 PM CST

## 2024-02-13 ENCOUNTER — PATIENT MESSAGE (OUTPATIENT)
Dept: CARDIOLOGY | Facility: CLINIC | Age: 89
End: 2024-02-13
Payer: MEDICARE

## 2024-02-14 ENCOUNTER — OFFICE VISIT (OUTPATIENT)
Dept: OPTOMETRY | Facility: CLINIC | Age: 89
End: 2024-02-14
Payer: MEDICARE

## 2024-02-14 ENCOUNTER — TELEPHONE (OUTPATIENT)
Dept: PODIATRY | Facility: CLINIC | Age: 89
End: 2024-02-14
Payer: MEDICARE

## 2024-02-14 ENCOUNTER — LAB VISIT (OUTPATIENT)
Dept: LAB | Facility: HOSPITAL | Age: 89
End: 2024-02-14
Attending: FAMILY MEDICINE
Payer: MEDICARE

## 2024-02-14 DIAGNOSIS — E11.69 TYPE 2 DIABETES MELLITUS WITH HYPERLIPIDEMIA: ICD-10-CM

## 2024-02-14 DIAGNOSIS — H00.025 HORDEOLUM INTERNUM OF LEFT LOWER EYELID: ICD-10-CM

## 2024-02-14 DIAGNOSIS — E11.9 TYPE 2 DIABETES MELLITUS WITHOUT RETINOPATHY: Primary | ICD-10-CM

## 2024-02-14 DIAGNOSIS — E78.5 TYPE 2 DIABETES MELLITUS WITH HYPERLIPIDEMIA: ICD-10-CM

## 2024-02-14 DIAGNOSIS — H40.013 OPEN ANGLE WITH BORDERLINE FINDINGS AND LOW GLAUCOMA RISK IN BOTH EYES: ICD-10-CM

## 2024-02-14 DIAGNOSIS — N18.32 STAGE 3B CHRONIC KIDNEY DISEASE: ICD-10-CM

## 2024-02-14 DIAGNOSIS — I10 PRIMARY HYPERTENSION: Primary | ICD-10-CM

## 2024-02-14 DIAGNOSIS — H52.03 HYPEROPIA WITH ASTIGMATISM AND PRESBYOPIA, BILATERAL: ICD-10-CM

## 2024-02-14 DIAGNOSIS — H52.4 HYPEROPIA WITH ASTIGMATISM AND PRESBYOPIA, BILATERAL: ICD-10-CM

## 2024-02-14 DIAGNOSIS — Z96.1 PSEUDOPHAKIA OF BOTH EYES: ICD-10-CM

## 2024-02-14 DIAGNOSIS — D64.9 ANEMIA, UNSPECIFIED TYPE: ICD-10-CM

## 2024-02-14 DIAGNOSIS — H52.203 HYPEROPIA WITH ASTIGMATISM AND PRESBYOPIA, BILATERAL: ICD-10-CM

## 2024-02-14 LAB
ALBUMIN SERPL BCP-MCNC: 3.4 G/DL (ref 3.5–5.2)
ALP SERPL-CCNC: 74 U/L (ref 55–135)
ALT SERPL W/O P-5'-P-CCNC: 16 U/L (ref 10–44)
ANION GAP SERPL CALC-SCNC: 6 MMOL/L (ref 8–16)
AST SERPL-CCNC: 21 U/L (ref 10–40)
BASOPHILS # BLD AUTO: 0.03 K/UL (ref 0–0.2)
BASOPHILS NFR BLD: 0.3 % (ref 0–1.9)
BILIRUB SERPL-MCNC: 0.4 MG/DL (ref 0.1–1)
BUN SERPL-MCNC: 27 MG/DL (ref 8–23)
CALCIUM SERPL-MCNC: 9.7 MG/DL (ref 8.7–10.5)
CHLORIDE SERPL-SCNC: 105 MMOL/L (ref 95–110)
CO2 SERPL-SCNC: 28 MMOL/L (ref 23–29)
CREAT SERPL-MCNC: 1.5 MG/DL (ref 0.5–1.4)
DIFFERENTIAL METHOD BLD: ABNORMAL
EOSINOPHIL # BLD AUTO: 0.3 K/UL (ref 0–0.5)
EOSINOPHIL NFR BLD: 2.5 % (ref 0–8)
ERYTHROCYTE [DISTWIDTH] IN BLOOD BY AUTOMATED COUNT: 13.5 % (ref 11.5–14.5)
EST. GFR  (NO RACE VARIABLE): 33.5 ML/MIN/1.73 M^2
ESTIMATED AVG GLUCOSE: 163 MG/DL (ref 68–131)
GLUCOSE SERPL-MCNC: 135 MG/DL (ref 70–110)
HBA1C MFR BLD: 7.3 % (ref 4–5.6)
HCT VFR BLD AUTO: 34.6 % (ref 37–48.5)
HGB BLD-MCNC: 10.9 G/DL (ref 12–16)
IMM GRANULOCYTES # BLD AUTO: 0.03 K/UL (ref 0–0.04)
IMM GRANULOCYTES NFR BLD AUTO: 0.3 % (ref 0–0.5)
LYMPHOCYTES # BLD AUTO: 2.3 K/UL (ref 1–4.8)
LYMPHOCYTES NFR BLD: 22.3 % (ref 18–48)
MCH RBC QN AUTO: 30.2 PG (ref 27–31)
MCHC RBC AUTO-ENTMCNC: 31.5 G/DL (ref 32–36)
MCV RBC AUTO: 96 FL (ref 82–98)
MONOCYTES # BLD AUTO: 0.7 K/UL (ref 0.3–1)
MONOCYTES NFR BLD: 6.4 % (ref 4–15)
NEUTROPHILS # BLD AUTO: 7.1 K/UL (ref 1.8–7.7)
NEUTROPHILS NFR BLD: 68.2 % (ref 38–73)
NRBC BLD-RTO: 0 /100 WBC
PLATELET # BLD AUTO: 259 K/UL (ref 150–450)
PMV BLD AUTO: 11 FL (ref 9.2–12.9)
POTASSIUM SERPL-SCNC: 4.8 MMOL/L (ref 3.5–5.1)
PROT SERPL-MCNC: 6.9 G/DL (ref 6–8.4)
RBC # BLD AUTO: 3.61 M/UL (ref 4–5.4)
SODIUM SERPL-SCNC: 139 MMOL/L (ref 136–145)
WBC # BLD AUTO: 10.45 K/UL (ref 3.9–12.7)

## 2024-02-14 PROCEDURE — 36415 COLL VENOUS BLD VENIPUNCTURE: CPT | Mod: PO | Performed by: FAMILY MEDICINE

## 2024-02-14 PROCEDURE — 99999 PR PBB SHADOW E&M-EST. PATIENT-LVL III: CPT | Mod: PBBFAC,,,

## 2024-02-14 PROCEDURE — 3288F FALL RISK ASSESSMENT DOCD: CPT | Mod: CPTII,S$GLB,,

## 2024-02-14 PROCEDURE — 1126F AMNT PAIN NOTED NONE PRSNT: CPT | Mod: CPTII,S$GLB,,

## 2024-02-14 PROCEDURE — 92133 CPTRZD OPH DX IMG PST SGM ON: CPT | Mod: S$GLB,,,

## 2024-02-14 PROCEDURE — 83036 HEMOGLOBIN GLYCOSYLATED A1C: CPT | Performed by: FAMILY MEDICINE

## 2024-02-14 PROCEDURE — 99204 OFFICE O/P NEW MOD 45 MIN: CPT | Mod: S$GLB,,,

## 2024-02-14 PROCEDURE — 92015 DETERMINE REFRACTIVE STATE: CPT | Mod: S$GLB,,,

## 2024-02-14 PROCEDURE — 85025 COMPLETE CBC W/AUTO DIFF WBC: CPT | Performed by: FAMILY MEDICINE

## 2024-02-14 PROCEDURE — 1101F PT FALLS ASSESS-DOCD LE1/YR: CPT | Mod: CPTII,S$GLB,,

## 2024-02-14 PROCEDURE — 2023F DILAT RTA XM W/O RTNOPTHY: CPT | Mod: CPTII,S$GLB,,

## 2024-02-14 PROCEDURE — 80053 COMPREHEN METABOLIC PANEL: CPT | Performed by: FAMILY MEDICINE

## 2024-02-14 PROCEDURE — 1159F MED LIST DOCD IN RCRD: CPT | Mod: CPTII,S$GLB,,

## 2024-02-14 RX ORDER — METOPROLOL SUCCINATE 25 MG/1
12.5 TABLET, EXTENDED RELEASE ORAL DAILY
Qty: 45 TABLET | Refills: 1 | Status: ON HOLD | OUTPATIENT
Start: 2024-02-14 | End: 2024-06-14

## 2024-02-14 RX ORDER — NEOMYCIN SULFATE, POLYMYXIN B SULFATE, AND DEXAMETHASONE 3.5; 10000; 1 MG/G; [USP'U]/G; MG/G
OINTMENT OPHTHALMIC
Qty: 3.5 G | Refills: 0 | Status: SHIPPED | OUTPATIENT
Start: 2024-02-14 | End: 2024-06-12 | Stop reason: CLARIF

## 2024-02-14 NOTE — PROGRESS NOTES
HPI     Diabetic Eye Exam     Additional comments: DM eye exam           Comments    DLE: x 1 yr    Pt states had cat sx OD x 1 yr and OS done x 2-3 yrs. No va complaints   today. Uses rx gls for dist and near. No floaters or flashes at this time.   Has had some floaters in past. Has stye on left lower eyelid x 1 wk that's   still a little sore but getting better. Has been washing with warm water.   Uses AT's PRN.     LBSL: 127 this am  Hemoglobin A1C       Date                     Value               Ref Range             Status                11/09/2023               8.0 (H)             4.0 - 5.6 %           Final                 05/02/2023               7.4 (H)             4.0 - 5.6 %           Final                 11/01/2022               7.3 (H)             4.0 - 5.6 %           Final                    Last edited by Quintana, Cheryle on 2/14/2024  9:00 AM.            Assessment /Plan     For exam results, see Encounter Report.    Type 2 diabetes mellitus without retinopathy    Type 2 diabetes mellitus with hyperlipidemia  -     Ambulatory referral/consult to Optometry    Open angle with borderline findings and low glaucoma risk in both eyes  -     Posterior Segment OCT Optic Nerve- Both eyes    Hordeolum internum of left lower eyelid  -     neomycin-polymyxin-dexamethasone (MAXITROL) 3.5 mg/g-10,000 unit/g-0.1 % Oint; Apply sparingly to the left lower eyelid three times a day for seven days.  Dispense: 3.5 g; Refill: 0    Pseudophakia of both eyes    Hyperopia with astigmatism and presbyopia, bilateral      1-2. No diabetic retinopathy or macular edema evident on today's exam OD, OS. Ed pt on importance of maintaining strict BS control due to potential for visual fluctuations and/or vision loss. Monitor with yearly dilated exam.    3. Larger than average CD ratio, IOP WNL, endothelial pigment present. Ed pt on findings and on nature/etiology of glaucoma. Baseline RNFL OCT obtained today shows thinning of the  RNFL inferior temporal OD. All other quadrants OD and OS WNL. Continue to monitor yearly for changes with repeat RNFL OCT. If progression, consider full glaucoma work up.    4. Moderate internal hordeolum of the left lower eyelid. Ed pt on findings and on nature/etiology of hordeola. Rx'd Maxitrol gema to apply to the left lower lid TID x 7 days. Advised frequent warm compresses with gentle digital massage. Ed pt to call or message if no improvement or worsening of symptoms.    5. PCIOL OU. Stable. Monitor yearly for changes.    6. Discussed spectacle options with pt and released final spec rx. Ed pt on change in rx and adaptation.    RTC: 1 year for comprehensive exam or sooner prn

## 2024-02-15 ENCOUNTER — TELEPHONE (OUTPATIENT)
Dept: PODIATRY | Facility: CLINIC | Age: 89
End: 2024-02-15
Payer: MEDICARE

## 2024-02-15 NOTE — TELEPHONE ENCOUNTER
LMOM to call to reschedule her appt her appt with Dr Fernandez has been cancelled left several message for pt to call back.

## 2024-02-20 ENCOUNTER — TELEPHONE (OUTPATIENT)
Dept: PODIATRY | Facility: CLINIC | Age: 89
End: 2024-02-20
Payer: MEDICARE

## 2024-03-04 ENCOUNTER — OFFICE VISIT (OUTPATIENT)
Dept: PODIATRY | Facility: CLINIC | Age: 89
End: 2024-03-04
Payer: MEDICARE

## 2024-03-04 VITALS — WEIGHT: 144.81 LBS | BODY MASS INDEX: 29.19 KG/M2 | HEIGHT: 59 IN

## 2024-03-04 DIAGNOSIS — I87.2 VENOUS INSUFFICIENCY (CHRONIC) (PERIPHERAL): ICD-10-CM

## 2024-03-04 DIAGNOSIS — B35.1 ONYCHOMYCOSIS DUE TO DERMATOPHYTE: ICD-10-CM

## 2024-03-04 DIAGNOSIS — I73.9 PVD (PERIPHERAL VASCULAR DISEASE): Primary | ICD-10-CM

## 2024-03-04 PROCEDURE — 99999 PR PBB SHADOW E&M-EST. PATIENT-LVL II: CPT | Mod: PBBFAC,,, | Performed by: PODIATRIST

## 2024-03-04 PROCEDURE — 1159F MED LIST DOCD IN RCRD: CPT | Mod: CPTII,S$GLB,, | Performed by: PODIATRIST

## 2024-03-04 PROCEDURE — 11721 DEBRIDE NAIL 6 OR MORE: CPT | Mod: Q9,S$GLB,, | Performed by: PODIATRIST

## 2024-03-04 PROCEDURE — 1101F PT FALLS ASSESS-DOCD LE1/YR: CPT | Mod: CPTII,S$GLB,, | Performed by: PODIATRIST

## 2024-03-04 PROCEDURE — 1126F AMNT PAIN NOTED NONE PRSNT: CPT | Mod: CPTII,S$GLB,, | Performed by: PODIATRIST

## 2024-03-04 PROCEDURE — 99499 UNLISTED E&M SERVICE: CPT | Mod: S$GLB,,, | Performed by: PODIATRIST

## 2024-03-04 PROCEDURE — 3288F FALL RISK ASSESSMENT DOCD: CPT | Mod: CPTII,S$GLB,, | Performed by: PODIATRIST

## 2024-03-05 NOTE — PROGRESS NOTES
Subjective:      Patient ID: Dolores B LeJeune is a 88 y.o. female.    Chief Complaint: Venous Insufficiency      Kelly is a 88 y.o. female witha past medical history of Acute MI, Anticoagulant long-term use, Carpal tunnel syndrome, Cataracts, bilateral, Colon polyps, Coronary artery disease, DM type 2 (diabetes mellitus, type 2), HTN (hypertension), Hyperlipidemia, Osteoarthritis, and Peripheral neuropathy. The patient's chief complaint consists of toenails that are in need of trimming.  Denies experiencing pain from the nails with today's exam.  Has not attempted to self treat.  Denies symptoms of claudication or rest pain.  She continues to notice moderate edema of the lower legs and ankles.  Denies this being associated with symptoms of discomfort.  Denies noticing venous stasis dermatitis or venous blistering.   Denies any additional pedal complaints.      PCP: Silvia Soto MD  Last visit: 11/23  Past Medical History:   Diagnosis Date    Acute MI     Anticoagulant long-term use     Carpal tunnel syndrome     Colon polyps     Coronary artery disease     DM type 2 (diabetes mellitus, type 2)     HTN (hypertension)     Hyperlipidemia     Osteoarthritis     Peripheral neuropathy        Past Surgical History:   Procedure Laterality Date    CAROTID STENT      1    CATARACT EXTRACTION W/  INTRAOCULAR LENS IMPLANT Right 2023    Dr Jessica Gant    CATARACT EXTRACTION W/  INTRAOCULAR LENS IMPLANT Left     [id number] Dr Jessica Gant    COLONOSCOPY  1/8/2008  [facility]    A 3 mm by 6 mm polyp in the cecum.  FRAGMENTS OF TUBULAR ADENOMA.    A 1 mm polyp in the recto-sigmoid colon.  HYPERPLASTIC POLYP.    Extremely redundant colon.      CORONARY ARTERY BYPASS GRAFT  01/24/2018    [facility], Dr. Echols. LIMA to LAD, SVG to OM1, SVG to OM2, SVG to PDA    EYE SURGERY      eye surgery    HYSTERECTOMY      ovaries remain    ptyregium      TONSILLECTOMY         Family History   Problem Relation Age of Onset    Tremor Mother      Hypertension Mother     Heart disease Mother     Stroke Father     Glaucoma Maternal Aunt     Macular degeneration Daughter 10        Youth Degeneration       Social History     Socioeconomic History    Marital status:     Number of children: 4   Tobacco Use    Smoking status: Former     Current packs/day: 0.00     Average packs/day: 0.5 packs/day for 16.2 years (8.1 ttl pk-yrs)     Types: Cigarettes     Start date:      Quit date: 3/15/1972     Years since quittin.0    Smokeless tobacco: Never   Substance and Sexual Activity    Alcohol use: No    Drug use: No       Current Outpatient Medications   Medication Sig Dispense Refill    ACCU-CHEK SOFTCLIX LANCETS [facility] USE ONE AS DIRECTED TWICE DAILY 200 each 10    aspirin (ECOTRIN) 81 MG EC tablet Take 81 mg by mouth once daily.      atorvastatin (LIPITOR) 40 MG tablet Take 1 tablet (40 mg total) by mouth once daily. 90 tablet 1    CALCIUM CARBONATE/VITAMIN D3 (CALCIUM 500 WITH D ORAL) Take by mouth.      clopidogreL (PLAVIX) 75 mg tablet TAKE  ONE TABLET BY MOUTH DAILY 90 tablet 1    gabapentin (NEURONTIN) 300 MG capsule TAKE 1 CAPSULE BY MOUTH DAILY 90 capsule 3    loratadine (CLARITIN) 10 mg tablet Take 1 tablet (10 mg total) by mouth once daily. 30 tablet     losartan (COZAAR) 25 MG tablet TAKE 1/2 TABLETS BY MOUTH IN THE EVENING 45 tablet 1    meclizine (ANTIVERT) 25 mg tablet Take 1 tablet by mouth Three times daily as needed.      metoprolol succinate (TOPROL-XL) 25 MG 24 hr tablet Take 0.5 tablets (12.5 mg total) by mouth once daily. 45 tablet 1    mirabegron (MYRBETRIQ) 50 mg Tb24 Take 1 tablet (50 mg total) by mouth once daily. 90 tablet 2    neomycin-polymyxin-dexamethasone (MAXITROL) 3.5 mg/g-10,000 unit/g-0.1 % Oint Apply sparingly to the left lower eyelid three times a day for seven days. 3.5 g 0    ONETOUCH DELICA LANCETS 33 gauge Misc       ONETOUCH VERIO TEST STRIPS Strp USE ONE STRIP FOR TESTING TWICE DAILY 200 each 3    pantoprazole  (PROTONIX) 40 MG tablet Take 40 mg by mouth once daily.      repaglinide (PRANDIN) 1 MG tablet Take 1 tablet (1 mg total) by mouth 3 (three) times daily before meals. 270 tablet 3     No current facility-administered medications for this visit.       Review of patient's allergies indicates:   Allergen Reactions    Albuterol Other (See Comments)     Palpitations/tremor      Sulfa (sulfonamide antibiotics) Rash       Hemoglobin A1C   Date Value Ref Range Status   02/14/2024 7.3 (H) 4.0 - 5.6 % Final     Comment:     ADA Screening Guidelines:  5.7-6.4%  Consistent with prediabetes  >or=6.5%  Consistent with diabetes    High levels of fetal hemoglobin interfere with the HbA1C  assay. Heterozygous hemoglobin variants (HbS, HgC, etc)do  not significantly interfere with this assay.   However, presence of multiple variants may affect accuracy.     11/09/2023 8.0 (H) 4.0 - 5.6 % Final     Comment:     ADA Screening Guidelines:  5.7-6.4%  Consistent with prediabetes  >or=6.5%  Consistent with diabetes    High levels of fetal hemoglobin interfere with the HbA1C  assay. Heterozygous hemoglobin variants (HbS, HgC, etc)do  not significantly interfere with this assay.   However, presence of multiple variants may affect accuracy.     05/02/2023 7.4 (H) 4.0 - 5.6 % Final     Comment:     ADA Screening Guidelines:  5.7-6.4%  Consistent with prediabetes  >or=6.5%  Consistent with diabetes    High levels of fetal hemoglobin interfere with the HbA1C  assay. Heterozygous hemoglobin variants (HbS, HgC, etc)do  not significantly interfere with this assay.   However, presence of multiple variants may affect accuracy.         Review of Systems   Constitutional: Negative for chills and fever.   Cardiovascular:  Positive for leg swelling. Negative for claudication.   Skin:  Positive for color change and nail changes.   Musculoskeletal:  Positive for joint swelling. Negative for joint pain, muscle cramps and muscle weakness.   Gastrointestinal:   Negative for nausea and vomiting.   Neurological:  Negative for numbness and paresthesias.   Psychiatric/Behavioral:  Negative for altered mental status.            Objective:      Physical Exam  Constitutional:       Appearance: Normal appearance. She is not ill-appearing.   Cardiovascular:      Pulses:           Dorsalis pedis pulses are 1+ on the right side and 1+ on the left side.        Posterior tibial pulses are 1+ on the right side and 1+ on the left side.      Comments: CFT is 3 seconds bilateral.  Pedal hair growth is absent bilateral.  Varicosities noted bilateral.  Moderate non pitting lower extremity edema noted bilateral.  Toes are cool to touch bilateral.    Musculoskeletal:         General: Swelling present. No tenderness or signs of injury.      Comments: Muscle strength 5/5 in all muscle groups bilateral.  No tenderness nor crepitation with ROM of foot/ankle joints bilateral.  (-) for pain with palpation of bilateral foot and ankle.   Skin:     General: Skin is warm and dry.      Capillary Refill: Capillary refill takes more than 3 seconds.      Findings: No bruising, ecchymosis, erythema, signs of injury, laceration, lesion, petechiae, rash or wound.      Comments: Increased pedal turgor noted bilateral.  Toenails x 10 appear thickened by 2 mm, elongated by 7 mm, and discolored with subungual debris.     Neurological:      General: No focal deficit present.      Mental Status: She is alert.      Sensory: No sensory deficit.      Motor: No weakness or atrophy.      Comments: Light touch is intact bilateral.                 Assessment:       Encounter Diagnoses   Name Primary?    PVD (peripheral vascular disease) Yes    Venous insufficiency (chronic) (peripheral)     Onychomycosis due to dermatophyte              Plan:       Kelly was seen today for venous insufficiency.    Diagnoses and all orders for this visit:    PVD (peripheral vascular disease)    Venous insufficiency (chronic)  (peripheral)    Onychomycosis due to dermatophyte          I counseled the patient on her conditions, their implications and medical management.    PVD remains stable with today's exam.    Advised to inspect the LE's daily for signs of stasis dermatitis and venous ulceration.      Patient instructed on proper foot hygeine. We discussed wearing proper shoe gear, daily foot inspections, never walking without protective shoe gear, never putting sharp instruments to feet    With patient's permission, nails were aggressively reduced and debrided x 10 to their soft tissue attachment mechanically and with electric , removing all offending nail and debris. Patient relates relief following the procedure. She will continue to monitor the areas daily, inspect her feet, wear protective shoe gear when ambulatory, moisturizer to maintain skin integrity and follow in this office in approximately 3 months, sooner p.r.n.    RTC for routine high risk exam in three months.      Lucian Fernandez DPM

## 2024-03-10 DIAGNOSIS — Z79.02 LONG TERM (CURRENT) USE OF ANTITHROMBOTICS/ANTIPLATELETS: Primary | ICD-10-CM

## 2024-03-11 RX ORDER — CLOPIDOGREL BISULFATE 75 MG/1
TABLET ORAL
Qty: 90 TABLET | Refills: 1 | Status: ON HOLD | OUTPATIENT
Start: 2024-03-11 | End: 2024-06-14

## 2024-04-24 ENCOUNTER — PATIENT MESSAGE (OUTPATIENT)
Dept: FAMILY MEDICINE | Facility: CLINIC | Age: 89
End: 2024-04-24
Payer: MEDICARE

## 2024-04-24 DIAGNOSIS — E11.59 HYPERTENSION ASSOCIATED WITH DIABETES: Primary | ICD-10-CM

## 2024-04-24 DIAGNOSIS — N18.32 STAGE 3B CHRONIC KIDNEY DISEASE: ICD-10-CM

## 2024-04-24 DIAGNOSIS — I15.2 HYPERTENSION ASSOCIATED WITH DIABETES: Primary | ICD-10-CM

## 2024-04-24 DIAGNOSIS — E11.8 TYPE 2 DIABETES MELLITUS WITH COMPLICATION, WITHOUT LONG-TERM CURRENT USE OF INSULIN: ICD-10-CM

## 2024-05-07 ENCOUNTER — LAB VISIT (OUTPATIENT)
Dept: LAB | Facility: HOSPITAL | Age: 89
End: 2024-05-07
Attending: FAMILY MEDICINE
Payer: MEDICARE

## 2024-05-07 DIAGNOSIS — E11.8 TYPE 2 DIABETES MELLITUS WITH COMPLICATION, WITHOUT LONG-TERM CURRENT USE OF INSULIN: ICD-10-CM

## 2024-05-07 DIAGNOSIS — E11.59 HYPERTENSION ASSOCIATED WITH DIABETES: ICD-10-CM

## 2024-05-07 DIAGNOSIS — I15.2 HYPERTENSION ASSOCIATED WITH DIABETES: ICD-10-CM

## 2024-05-07 DIAGNOSIS — N18.32 STAGE 3B CHRONIC KIDNEY DISEASE: ICD-10-CM

## 2024-05-07 LAB
ALBUMIN SERPL BCP-MCNC: 3.4 G/DL (ref 3.5–5.2)
ALP SERPL-CCNC: 76 U/L (ref 55–135)
ALT SERPL W/O P-5'-P-CCNC: 20 U/L (ref 10–44)
ANION GAP SERPL CALC-SCNC: 7 MMOL/L (ref 8–16)
AST SERPL-CCNC: 23 U/L (ref 10–40)
BASOPHILS # BLD AUTO: 0.04 K/UL (ref 0–0.2)
BASOPHILS NFR BLD: 0.4 % (ref 0–1.9)
BILIRUB SERPL-MCNC: 0.3 MG/DL (ref 0.1–1)
BUN SERPL-MCNC: 23 MG/DL (ref 8–23)
CALCIUM SERPL-MCNC: 9.9 MG/DL (ref 8.7–10.5)
CHLORIDE SERPL-SCNC: 104 MMOL/L (ref 95–110)
CO2 SERPL-SCNC: 27 MMOL/L (ref 23–29)
CREAT SERPL-MCNC: 1.4 MG/DL (ref 0.5–1.4)
DIFFERENTIAL METHOD BLD: ABNORMAL
EOSINOPHIL # BLD AUTO: 0.3 K/UL (ref 0–0.5)
EOSINOPHIL NFR BLD: 2.6 % (ref 0–8)
ERYTHROCYTE [DISTWIDTH] IN BLOOD BY AUTOMATED COUNT: 13.4 % (ref 11.5–14.5)
EST. GFR  (NO RACE VARIABLE): 36.4 ML/MIN/1.73 M^2
ESTIMATED AVG GLUCOSE: 163 MG/DL (ref 68–131)
GLUCOSE SERPL-MCNC: 119 MG/DL (ref 70–110)
HBA1C MFR BLD: 7.3 % (ref 4–5.6)
HCT VFR BLD AUTO: 32.2 % (ref 37–48.5)
HGB BLD-MCNC: 10 G/DL (ref 12–16)
IMM GRANULOCYTES # BLD AUTO: 0.04 K/UL (ref 0–0.04)
IMM GRANULOCYTES NFR BLD AUTO: 0.4 % (ref 0–0.5)
LYMPHOCYTES # BLD AUTO: 2.5 K/UL (ref 1–4.8)
LYMPHOCYTES NFR BLD: 24.3 % (ref 18–48)
MCH RBC QN AUTO: 29.7 PG (ref 27–31)
MCHC RBC AUTO-ENTMCNC: 31.1 G/DL (ref 32–36)
MCV RBC AUTO: 96 FL (ref 82–98)
MONOCYTES # BLD AUTO: 0.6 K/UL (ref 0.3–1)
MONOCYTES NFR BLD: 5.5 % (ref 4–15)
NEUTROPHILS # BLD AUTO: 6.9 K/UL (ref 1.8–7.7)
NEUTROPHILS NFR BLD: 66.8 % (ref 38–73)
NRBC BLD-RTO: 0 /100 WBC
PLATELET # BLD AUTO: 264 K/UL (ref 150–450)
PMV BLD AUTO: 10.8 FL (ref 9.2–12.9)
POTASSIUM SERPL-SCNC: 4.7 MMOL/L (ref 3.5–5.1)
PROT SERPL-MCNC: 7.1 G/DL (ref 6–8.4)
RBC # BLD AUTO: 3.37 M/UL (ref 4–5.4)
SODIUM SERPL-SCNC: 138 MMOL/L (ref 136–145)
WBC # BLD AUTO: 10.27 K/UL (ref 3.9–12.7)

## 2024-05-07 PROCEDURE — 80053 COMPREHEN METABOLIC PANEL: CPT | Performed by: FAMILY MEDICINE

## 2024-05-07 PROCEDURE — 36415 COLL VENOUS BLD VENIPUNCTURE: CPT | Mod: PO | Performed by: FAMILY MEDICINE

## 2024-05-07 PROCEDURE — 83036 HEMOGLOBIN GLYCOSYLATED A1C: CPT | Performed by: FAMILY MEDICINE

## 2024-05-07 PROCEDURE — 85025 COMPLETE CBC W/AUTO DIFF WBC: CPT | Performed by: FAMILY MEDICINE

## 2024-05-14 ENCOUNTER — OFFICE VISIT (OUTPATIENT)
Dept: FAMILY MEDICINE | Facility: CLINIC | Age: 89
End: 2024-05-14
Payer: MEDICARE

## 2024-05-14 VITALS
HEIGHT: 59 IN | OXYGEN SATURATION: 98 % | DIASTOLIC BLOOD PRESSURE: 60 MMHG | HEART RATE: 82 BPM | BODY MASS INDEX: 30.98 KG/M2 | SYSTOLIC BLOOD PRESSURE: 120 MMHG | WEIGHT: 153.69 LBS

## 2024-05-14 DIAGNOSIS — I25.708 CORONARY ARTERY DISEASE OF BYPASS GRAFT OF NATIVE HEART WITH STABLE ANGINA PECTORIS: ICD-10-CM

## 2024-05-14 DIAGNOSIS — D63.1 ANEMIA OF CHRONIC KIDNEY FAILURE, STAGE 3 (MODERATE): ICD-10-CM

## 2024-05-14 DIAGNOSIS — I15.2 HYPERTENSION ASSOCIATED WITH DIABETES: ICD-10-CM

## 2024-05-14 DIAGNOSIS — E11.9 CONTROLLED TYPE 2 DIABETES MELLITUS WITHOUT COMPLICATION, WITHOUT LONG-TERM CURRENT USE OF INSULIN: Primary | ICD-10-CM

## 2024-05-14 DIAGNOSIS — E66.3 OVERWEIGHT (BMI 25.0-29.9): ICD-10-CM

## 2024-05-14 DIAGNOSIS — N18.32 STAGE 3B CHRONIC KIDNEY DISEASE: ICD-10-CM

## 2024-05-14 DIAGNOSIS — E11.59 HYPERTENSION ASSOCIATED WITH DIABETES: ICD-10-CM

## 2024-05-14 DIAGNOSIS — N18.30 ANEMIA OF CHRONIC KIDNEY FAILURE, STAGE 3 (MODERATE): ICD-10-CM

## 2024-05-14 PROCEDURE — 1159F MED LIST DOCD IN RCRD: CPT | Mod: CPTII,S$GLB,, | Performed by: FAMILY MEDICINE

## 2024-05-14 PROCEDURE — 3288F FALL RISK ASSESSMENT DOCD: CPT | Mod: CPTII,S$GLB,, | Performed by: FAMILY MEDICINE

## 2024-05-14 PROCEDURE — 99214 OFFICE O/P EST MOD 30 MIN: CPT | Mod: S$GLB,,, | Performed by: FAMILY MEDICINE

## 2024-05-14 PROCEDURE — 1101F PT FALLS ASSESS-DOCD LE1/YR: CPT | Mod: CPTII,S$GLB,, | Performed by: FAMILY MEDICINE

## 2024-05-14 PROCEDURE — 99999 PR PBB SHADOW E&M-EST. PATIENT-LVL IV: CPT | Mod: PBBFAC,,, | Performed by: FAMILY MEDICINE

## 2024-05-14 PROCEDURE — 1160F RVW MEDS BY RX/DR IN RCRD: CPT | Mod: CPTII,S$GLB,, | Performed by: FAMILY MEDICINE

## 2024-05-14 PROCEDURE — 1126F AMNT PAIN NOTED NONE PRSNT: CPT | Mod: CPTII,S$GLB,, | Performed by: FAMILY MEDICINE

## 2024-05-14 NOTE — PROGRESS NOTES
Subjective:       Patient ID: Dolores B LeJeune is a 88 y.o. female.    Chief Complaint: Follow-up    Pt is known to me.  Pt is here for followup of chronic medical issues.  The pt is doing well in general with no acute complaints.  The pt continues to have swelling of her right leg and ankle since her CABG graft was harvested from that leg.  She uses a compression sock but not daily.  She says it gets better if she is up walking a lot--as in shopping with one of her children.  The pt is using the Prandin twice daily--her Hba1c is down to 7.3.   She has good BP control.  Her renal function is stable.  Her anemia is in her usual range.  The pt sees Dr. Poe for CAD--she is stable.  Discussed health maintenance with pt and will schedule appropriate studies and/or immunizations.      Follow-up      Review of Systems    Objective:      Physical Exam  Vitals and nursing note reviewed.   Constitutional:       Appearance: She is well-developed. She is obese.   HENT:      Head: Normocephalic.   Eyes:      Conjunctiva/sclera: Conjunctivae normal.      Pupils: Pupils are equal, round, and reactive to light.   Neck:      Thyroid: No thyromegaly.   Cardiovascular:      Rate and Rhythm: Normal rate and regular rhythm.      Pulses:           Dorsalis pedis pulses are 2+ on the right side and 2+ on the left side.        Posterior tibial pulses are 2+ on the right side and 2+ on the left side.      Heart sounds: Normal heart sounds.   Pulmonary:      Effort: Pulmonary effort is normal.      Breath sounds: Normal breath sounds.   Abdominal:      General: Bowel sounds are normal.      Palpations: Abdomen is soft.      Tenderness: There is no abdominal tenderness.   Musculoskeletal:         General: No tenderness or deformity. Normal range of motion.      Cervical back: Normal range of motion and neck supple.      Right lower leg: Edema (mild) present.      Left lower leg: Edema (mild) present.      Right foot: Normal range of  motion. No deformity.      Left foot: Normal range of motion. No deformity.   Feet:      Right foot:      Protective Sensation: 7 sites tested.  7 sites sensed.      Skin integrity: No ulcer.      Left foot:      Protective Sensation: 7 sites tested.  7 sites sensed.      Skin integrity: No ulcer.   Lymphadenopathy:      Cervical: No cervical adenopathy.   Skin:     General: Skin is warm and dry.   Neurological:      Mental Status: She is alert and oriented to person, place, and time.      Cranial Nerves: No cranial nerve deficit.      Motor: No abnormal muscle tone.      Coordination: Coordination normal.      Deep Tendon Reflexes: Reflexes normal.   Psychiatric:         Behavior: Behavior normal.         Assessment:       1. Controlled type 2 diabetes mellitus without complication, without long-term current use of insulin    2. Hypertension associated with diabetes    3. Stage 3b chronic kidney disease    4. Coronary artery disease of bypass graft of native heart with stable angina pectoris    5. Overweight (BMI 25.0-29.9)    6. Anemia of chronic kidney failure, stage 3 (moderate)        Plan:       Kelly was seen today for follow-up.    Diagnoses and all orders for this visit:    Controlled type 2 diabetes mellitus without complication, without long-term current use of insulin  -     Hemoglobin A1C; Future    Hypertension associated with diabetes  -     CBC Auto Differential; Future  -     Comprehensive Metabolic Panel; Future  -     Lipid Panel; Future    Stage 3b chronic kidney disease  -     CBC Auto Differential; Future  -     Lipid Panel; Future    Coronary artery disease of bypass graft of native heart with stable angina pectoris    Overweight (BMI 25.0-29.9)    Anemia of chronic kidney failure, stage 3 (moderate)  -     CBC Auto Differential; Future      During this visit, I reviewed the pt's history, medications, allergies, and problem list.

## 2024-05-30 ENCOUNTER — PATIENT MESSAGE (OUTPATIENT)
Dept: FAMILY MEDICINE | Facility: CLINIC | Age: 89
End: 2024-05-30
Payer: MEDICARE

## 2024-06-07 ENCOUNTER — LAB VISIT (OUTPATIENT)
Dept: LAB | Facility: HOSPITAL | Age: 89
End: 2024-06-07
Attending: FAMILY MEDICINE
Payer: MEDICARE

## 2024-06-07 ENCOUNTER — OFFICE VISIT (OUTPATIENT)
Dept: FAMILY MEDICINE | Facility: CLINIC | Age: 89
End: 2024-06-07
Payer: MEDICARE

## 2024-06-07 VITALS
WEIGHT: 151.44 LBS | HEART RATE: 77 BPM | BODY MASS INDEX: 30.53 KG/M2 | DIASTOLIC BLOOD PRESSURE: 50 MMHG | OXYGEN SATURATION: 98 % | SYSTOLIC BLOOD PRESSURE: 110 MMHG | HEIGHT: 59 IN

## 2024-06-07 DIAGNOSIS — K92.2 UPPER GI BLEED: Primary | ICD-10-CM

## 2024-06-07 DIAGNOSIS — K92.2 UPPER GI BLEED: ICD-10-CM

## 2024-06-07 LAB
BASOPHILS # BLD AUTO: 0.03 K/UL (ref 0–0.2)
BASOPHILS NFR BLD: 0.3 % (ref 0–1.9)
DIFFERENTIAL METHOD BLD: ABNORMAL
EOSINOPHIL # BLD AUTO: 0.5 K/UL (ref 0–0.5)
EOSINOPHIL NFR BLD: 4.2 % (ref 0–8)
ERYTHROCYTE [DISTWIDTH] IN BLOOD BY AUTOMATED COUNT: 13.3 % (ref 11.5–14.5)
HCT VFR BLD AUTO: 30 % (ref 37–48.5)
HGB BLD-MCNC: 9.7 G/DL (ref 12–16)
IMM GRANULOCYTES # BLD AUTO: 0.03 K/UL (ref 0–0.04)
IMM GRANULOCYTES NFR BLD AUTO: 0.3 % (ref 0–0.5)
LYMPHOCYTES # BLD AUTO: 2.6 K/UL (ref 1–4.8)
LYMPHOCYTES NFR BLD: 22.9 % (ref 18–48)
MCH RBC QN AUTO: 29.8 PG (ref 27–31)
MCHC RBC AUTO-ENTMCNC: 32.3 G/DL (ref 32–36)
MCV RBC AUTO: 92 FL (ref 82–98)
MONOCYTES # BLD AUTO: 0.8 K/UL (ref 0.3–1)
MONOCYTES NFR BLD: 6.9 % (ref 4–15)
NEUTROPHILS # BLD AUTO: 7.3 K/UL (ref 1.8–7.7)
NEUTROPHILS NFR BLD: 65.4 % (ref 38–73)
NRBC BLD-RTO: 0 /100 WBC
PLATELET # BLD AUTO: 326 K/UL (ref 150–450)
PMV BLD AUTO: 8.9 FL (ref 9.2–12.9)
RBC # BLD AUTO: 3.25 M/UL (ref 4–5.4)
WBC # BLD AUTO: 11.2 K/UL (ref 3.9–12.7)

## 2024-06-07 PROCEDURE — 1126F AMNT PAIN NOTED NONE PRSNT: CPT | Mod: CPTII,S$GLB,, | Performed by: FAMILY MEDICINE

## 2024-06-07 PROCEDURE — 36415 COLL VENOUS BLD VENIPUNCTURE: CPT | Mod: PO | Performed by: FAMILY MEDICINE

## 2024-06-07 PROCEDURE — 99213 OFFICE O/P EST LOW 20 MIN: CPT | Mod: S$GLB,,, | Performed by: FAMILY MEDICINE

## 2024-06-07 PROCEDURE — 3288F FALL RISK ASSESSMENT DOCD: CPT | Mod: CPTII,S$GLB,, | Performed by: FAMILY MEDICINE

## 2024-06-07 PROCEDURE — 1160F RVW MEDS BY RX/DR IN RCRD: CPT | Mod: CPTII,S$GLB,, | Performed by: FAMILY MEDICINE

## 2024-06-07 PROCEDURE — 1159F MED LIST DOCD IN RCRD: CPT | Mod: CPTII,S$GLB,, | Performed by: FAMILY MEDICINE

## 2024-06-07 PROCEDURE — G2211 COMPLEX E/M VISIT ADD ON: HCPCS | Mod: S$GLB,,, | Performed by: FAMILY MEDICINE

## 2024-06-07 PROCEDURE — 85025 COMPLETE CBC W/AUTO DIFF WBC: CPT | Mod: PO | Performed by: FAMILY MEDICINE

## 2024-06-07 PROCEDURE — 99999 PR PBB SHADOW E&M-EST. PATIENT-LVL IV: CPT | Mod: PBBFAC,,, | Performed by: FAMILY MEDICINE

## 2024-06-07 PROCEDURE — 1101F PT FALLS ASSESS-DOCD LE1/YR: CPT | Mod: CPTII,S$GLB,, | Performed by: FAMILY MEDICINE

## 2024-06-07 RX ORDER — DOXYCYCLINE 100 MG/1
100 CAPSULE ORAL 2 TIMES DAILY
Status: ON HOLD | COMMUNITY
Start: 2024-06-03 | End: 2024-06-14 | Stop reason: HOSPADM

## 2024-06-07 RX ORDER — METRONIDAZOLE 500 MG/1
500 TABLET ORAL 2 TIMES DAILY
Status: ON HOLD | COMMUNITY
Start: 2024-06-03 | End: 2024-06-14 | Stop reason: HOSPADM

## 2024-06-07 NOTE — PROGRESS NOTES
Subjective:       Patient ID: Dolores B LeJeune is a 88 y.o. female.    Chief Complaint: Hospital Follow Up    Pt is known to me.  The pt was in hospital in [place], [place] with an upper GI bleed.  She requires blood transfusion.  Her Hgb at discharge was 9.7.  The EGD showed an erosion.  She also tested + for H. Pylori.  She is on meds for that.  She was told at [facility] in [place] to stay on her Plavix but to stop the ASA.  Her BP is low.  She is on a half losartan in the evenings.      Review of Systems    Objective:      Physical Exam  Vitals and nursing note reviewed.   Constitutional:       General: She is not in acute distress.     Appearance: Normal appearance. She is well-developed. She is not ill-appearing.   HENT:      Head: Normocephalic.   Eyes:      Conjunctiva/sclera: Conjunctivae normal.      Pupils: Pupils are equal, round, and reactive to light.   Neck:      Thyroid: No thyromegaly.   Cardiovascular:      Rate and Rhythm: Normal rate and regular rhythm.      Heart sounds: Normal heart sounds.   Pulmonary:      Effort: Pulmonary effort is normal.      Breath sounds: Normal breath sounds.   Abdominal:      General: Bowel sounds are normal.      Palpations: Abdomen is soft.      Tenderness: There is no abdominal tenderness.   Musculoskeletal:         General: No tenderness or deformity. Normal range of motion.      Cervical back: Normal range of motion and neck supple.      Right lower leg: No edema.      Left lower leg: No edema.   Lymphadenopathy:      Cervical: No cervical adenopathy.   Skin:     General: Skin is warm and dry.   Neurological:      Mental Status: She is alert and oriented to person, place, and time.      Cranial Nerves: No cranial nerve deficit.      Motor: No abnormal muscle tone.      Coordination: Coordination normal.      Deep Tendon Reflexes: Reflexes normal.   Psychiatric:         Behavior: Behavior normal.         Assessment:       1. Upper GI bleed        Plan:       Kelly was  seen today for hospital follow up.    Diagnoses and all orders for this visit:    Upper GI bleed  -     Cancel: CBC Auto Differential; Future  -     Ambulatory referral/consult to Gastroenterology; Future  -     CBC Auto Differential; Future      During this visit, I reviewed the pt's history, medications, allergies, and problem list.

## 2024-06-10 ENCOUNTER — TELEPHONE (OUTPATIENT)
Dept: GASTROENTEROLOGY | Facility: CLINIC | Age: 89
End: 2024-06-10
Payer: MEDICARE

## 2024-06-10 ENCOUNTER — PATIENT MESSAGE (OUTPATIENT)
Dept: CARDIOLOGY | Facility: CLINIC | Age: 89
End: 2024-06-10
Payer: MEDICARE

## 2024-06-10 NOTE — TELEPHONE ENCOUNTER
----- Message from Syed Nash, Patient Care Assistant sent at 6/10/2024 10:26 AM CDT -----  Contact: Pt  Type: Return Call    Who Called: Pt  Who Left Message for Pt: Cricket  Does the patient know what this is regarding: Yes  Best Call Back Number: [phone]  Thank you~

## 2024-06-11 ENCOUNTER — TELEPHONE (OUTPATIENT)
Dept: PODIATRY | Facility: CLINIC | Age: 89
End: 2024-06-11
Payer: MEDICARE

## 2024-06-11 ENCOUNTER — TELEPHONE (OUTPATIENT)
Dept: GASTROENTEROLOGY | Facility: CLINIC | Age: 89
End: 2024-06-11
Payer: MEDICARE

## 2024-06-11 NOTE — TELEPHONE ENCOUNTER
Returned call to the patient's daughter, an appointment was set up for the patient , patient's daughter verbalized understanding of this.

## 2024-06-11 NOTE — TELEPHONE ENCOUNTER
----- Message from Katie Lobo sent at 6/11/2024  9:50 AM CDT -----  Regarding: return call  Contact: pt  Type:  Patient Returning Call    Who Called:  patient   Who Left Message for Patient:  george   Does the patient know what this is regarding?:    Best Call Back Number:  [phone]    Additional Information:  call back to advise

## 2024-06-11 NOTE — TELEPHONE ENCOUNTER
Spoke with the patient's daughter to reschedule appointment. First available appointment was scheduled for 7/17/24 @ 2:00 PM. Patient expressed understanding of the change.

## 2024-06-12 PROBLEM — K92.2 GI BLEED: Status: ACTIVE | Noted: 2024-06-12

## 2024-06-14 PROBLEM — R54 AGE-RELATED PHYSICAL DEBILITY: Status: ACTIVE | Noted: 2024-06-14

## 2024-06-18 PROCEDURE — G0180 MD CERTIFICATION HHA PATIENT: HCPCS | Mod: ,,, | Performed by: FAMILY MEDICINE

## 2024-06-20 ENCOUNTER — LAB VISIT (OUTPATIENT)
Dept: LAB | Facility: HOSPITAL | Age: 89
End: 2024-06-20
Attending: INTERNAL MEDICINE
Payer: MEDICARE

## 2024-06-20 ENCOUNTER — OFFICE VISIT (OUTPATIENT)
Dept: GASTROENTEROLOGY | Facility: CLINIC | Age: 89
End: 2024-06-20
Payer: MEDICARE

## 2024-06-20 VITALS — HEIGHT: 59 IN | WEIGHT: 150.56 LBS | BODY MASS INDEX: 30.35 KG/M2

## 2024-06-20 DIAGNOSIS — K25.4 GASTROINTESTINAL HEMORRHAGE ASSOCIATED WITH GASTRIC ULCER: ICD-10-CM

## 2024-06-20 DIAGNOSIS — D50.8 OTHER IRON DEFICIENCY ANEMIA: ICD-10-CM

## 2024-06-20 DIAGNOSIS — D50.8 OTHER IRON DEFICIENCY ANEMIA: Primary | ICD-10-CM

## 2024-06-20 LAB
ERYTHROCYTE [DISTWIDTH] IN BLOOD BY AUTOMATED COUNT: 15.1 % (ref 11.5–14.5)
HCT VFR BLD AUTO: 28.2 % (ref 37–48.5)
HGB BLD-MCNC: 9.1 G/DL (ref 12–16)
MCH RBC QN AUTO: 29.7 PG (ref 27–31)
MCHC RBC AUTO-ENTMCNC: 32.3 G/DL (ref 32–36)
MCV RBC AUTO: 92 FL (ref 82–98)
PLATELET # BLD AUTO: 279 K/UL (ref 150–450)
PMV BLD AUTO: 10.2 FL (ref 9.2–12.9)
RBC # BLD AUTO: 3.06 M/UL (ref 4–5.4)
WBC # BLD AUTO: 9.24 K/UL (ref 3.9–12.7)

## 2024-06-20 PROCEDURE — 36415 COLL VENOUS BLD VENIPUNCTURE: CPT | Mod: PO | Performed by: INTERNAL MEDICINE

## 2024-06-20 PROCEDURE — 1126F AMNT PAIN NOTED NONE PRSNT: CPT | Mod: CPTII,S$GLB,, | Performed by: INTERNAL MEDICINE

## 2024-06-20 PROCEDURE — 1101F PT FALLS ASSESS-DOCD LE1/YR: CPT | Mod: CPTII,S$GLB,, | Performed by: INTERNAL MEDICINE

## 2024-06-20 PROCEDURE — 99213 OFFICE O/P EST LOW 20 MIN: CPT | Mod: S$GLB,,, | Performed by: INTERNAL MEDICINE

## 2024-06-20 PROCEDURE — 3288F FALL RISK ASSESSMENT DOCD: CPT | Mod: CPTII,S$GLB,, | Performed by: INTERNAL MEDICINE

## 2024-06-20 PROCEDURE — 1111F DSCHRG MED/CURRENT MED MERGE: CPT | Mod: CPTII,S$GLB,, | Performed by: INTERNAL MEDICINE

## 2024-06-20 PROCEDURE — 85027 COMPLETE CBC AUTOMATED: CPT | Performed by: INTERNAL MEDICINE

## 2024-06-20 PROCEDURE — 99999 PR PBB SHADOW E&M-EST. PATIENT-LVL III: CPT | Mod: PBBFAC,,, | Performed by: INTERNAL MEDICINE

## 2024-06-20 NOTE — PROGRESS NOTES
Subjective     Patient ID: Dolores B LeJeune is a 88 y.o. female.    Chief Complaint: Follow-up    Ms. LeJeune returns for follow-up after recent admission for GI bleed.  It was thought that she was bleeding from the same site that bled when she was out of town.  An additional clip was placed, for the possibility of a visible vessel.  She looks much better than she did in the hospital.  She reports she is feeling better, as well.  Although this morning, she saw a little daquan of blood on the toilet tissue.  Home health nurse has been out to see her.  And she resumed her 1st dose of Plavix this morning.  Her appetite is also a little better.      See recent Upper GI endoscopy 6/12/2024:  Indications:      Acute post hemorrhagic anemia, Coffee-ground emesis, Hematemesis, Follow-up of acute gastric ulcer with hemorrhage   Providers:             Ramy Hollins MD     Impression:    - Normal oropharynx.                          - Normal cricopharyngeus.                          - Normal esophagus.                          - Z-line regular, 34 cm from the incisors.                          - Medium-sized hiatal hernia.                          - An endoclip was found in the stomach.                          - Hematin (altered blood/coffee-ground-like material) in the gastric body (greater curvature).                          - Antritis.                          - Normal stomach otherwise.                          - Normal pylorus.                          - Normal examined duodenum.                          - No specimens collected.   Recommendation:        - Return patient to hospital [facility] for ongoing care.                          - Follow an antireflux regimen.                          - Continue present medications.                          - Use sucralfate tablets 1 gram PO QID.                          - Check hemoglobin q 8 hours for two days.                          - Repeat upper endoscopy in 3 months to  check healing.   Ramy Hollins MD   6/12/2024 06/17/24 03:29  Hemoglobin: 8.5 (L)  Hematocrit: 25.6 (L)  (L): Data is abnormally low      Review of Systems   Constitutional:  Negative for appetite change, fever and unexpected weight change.   HENT: Negative.  Negative for mouth sores, sore throat and trouble swallowing.    Eyes: Negative.    Respiratory: Negative.  Negative for cough and choking.    Cardiovascular: Negative.  Negative for chest pain and leg swelling.   Gastrointestinal:  Negative for abdominal distention, abdominal pain, anal bleeding, blood in stool, constipation, diarrhea, nausea and vomiting.   Genitourinary: Negative.    Musculoskeletal: Negative.    Integumentary:  Negative for color change and rash. Negative.   Neurological: Negative.    Hematological:  Negative for adenopathy.   Psychiatric/Behavioral: Negative.            Objective     Physical Exam  Vitals and nursing note reviewed.   Constitutional:       General: She is not in acute distress.     Appearance: Normal appearance. She is well-developed. She is obese.   HENT:      Mouth/Throat:      Mouth: Mucous membranes are moist.      Pharynx: No oropharyngeal exudate.   Eyes:      General: No scleral icterus.  Neck:      Thyroid: No thyromegaly.      Trachea: No tracheal deviation.   Cardiovascular:      Rate and Rhythm: Normal rate and regular rhythm.      Heart sounds: Normal heart sounds.   Pulmonary:      Effort: Pulmonary effort is normal.      Breath sounds: Normal breath sounds.   Abdominal:      General: Bowel sounds are normal. There is no distension.      Palpations: Abdomen is soft. There is no mass.      Tenderness: There is no abdominal tenderness. There is no guarding.   Lymphadenopathy:      Cervical: No cervical adenopathy.   Skin:     General: Skin is warm and dry.      Findings: No rash.   Neurological:      General: No focal deficit present.      Mental Status: She is alert and oriented to person, place,  and time.   Psychiatric:         Mood and Affect: Mood normal.         Behavior: Behavior normal.            Assessment and Plan     Other iron deficiency anemia  -     CBC Without Differential; Future; Expected date: 06/20/2024    Gastrointestinal hemorrhage associated with gastric ulcer      Continue medications the same.  Check a blood count today.  Consider a repeat EGD at 3 months from the time of admission.

## 2024-06-21 ENCOUNTER — OFFICE VISIT (OUTPATIENT)
Dept: FAMILY MEDICINE | Facility: CLINIC | Age: 89
End: 2024-06-21
Payer: MEDICARE

## 2024-06-21 VITALS
OXYGEN SATURATION: 97 % | WEIGHT: 150.38 LBS | HEIGHT: 59 IN | DIASTOLIC BLOOD PRESSURE: 56 MMHG | SYSTOLIC BLOOD PRESSURE: 120 MMHG | HEART RATE: 75 BPM | BODY MASS INDEX: 30.32 KG/M2

## 2024-06-21 DIAGNOSIS — A04.8 POSITIVE H. PYLORI TEST: ICD-10-CM

## 2024-06-21 DIAGNOSIS — K25.4 GASTROINTESTINAL HEMORRHAGE ASSOCIATED WITH GASTRIC ULCER: Primary | ICD-10-CM

## 2024-06-21 DIAGNOSIS — D62 ANEMIA ASSOCIATED WITH ACUTE BLOOD LOSS: ICD-10-CM

## 2024-06-21 PROCEDURE — 99999 PR PBB SHADOW E&M-EST. PATIENT-LVL III: CPT | Mod: PBBFAC,,, | Performed by: FAMILY MEDICINE

## 2024-06-21 NOTE — PROGRESS NOTES
Subjective:       Patient ID: Dolores B LeJeune is a 88 y.o. female.    Chief Complaint: Hospital Follow Up    Pt is known to me.  The pt presents for hospital follow up.  She has been hospitalized twice recently with UGI bleed.  She has an ulcer that required vessel clipping x 2.  She tested + for H. Pylori and is on appropriate treatment.  Her discharge Hct was 25.  She is up to 28 per Dr. Hollins yesterday.  He is following her for the ulcer.        Review of Systems    Objective:      Physical Exam  Constitutional:       Appearance: She is well-developed.   HENT:      Head: Normocephalic.   Eyes:      Conjunctiva/sclera: Conjunctivae normal.      Pupils: Pupils are equal, round, and reactive to light.   Neck:      Thyroid: No thyromegaly.   Cardiovascular:      Rate and Rhythm: Normal rate and regular rhythm.      Heart sounds: Normal heart sounds.   Pulmonary:      Effort: Pulmonary effort is normal.      Breath sounds: Normal breath sounds.   Abdominal:      General: Bowel sounds are normal.      Palpations: Abdomen is soft.      Tenderness: There is no abdominal tenderness.   Musculoskeletal:         General: No tenderness or deformity. Normal range of motion.      Cervical back: Normal range of motion and neck supple.   Lymphadenopathy:      Cervical: No cervical adenopathy.   Skin:     General: Skin is warm and dry.   Neurological:      Mental Status: She is alert and oriented to person, place, and time.      Cranial Nerves: No cranial nerve deficit.      Motor: No abnormal muscle tone.      Coordination: Coordination normal.      Deep Tendon Reflexes: Reflexes normal.   Psychiatric:         Behavior: Behavior normal.         Assessment:       1. Gastrointestinal hemorrhage associated with gastric ulcer    2. Anemia associated with acute blood loss    3. Positive H. pylori test        Plan:       Kelly was seen today for hospital follow up.    Diagnoses and all orders for this visit:    Gastrointestinal  hemorrhage associated with gastric ulcer  Comments:  Continue follow up with Dr. Hollins    Anemia associated with acute blood loss  Comments:  improving    Positive H. pylori test  Comments:  Finish antibiotics      During this visit, I reviewed the pt's history, medications, allergies, and problem list.

## 2024-07-08 ENCOUNTER — OFFICE VISIT (OUTPATIENT)
Dept: CARDIOLOGY | Facility: CLINIC | Age: 89
End: 2024-07-08
Payer: MEDICARE

## 2024-07-08 VITALS
SYSTOLIC BLOOD PRESSURE: 123 MMHG | HEART RATE: 74 BPM | WEIGHT: 146.81 LBS | DIASTOLIC BLOOD PRESSURE: 55 MMHG | BODY MASS INDEX: 29.6 KG/M2 | HEIGHT: 59 IN

## 2024-07-08 DIAGNOSIS — K25.4 GASTROINTESTINAL HEMORRHAGE ASSOCIATED WITH GASTRIC ULCER: ICD-10-CM

## 2024-07-08 DIAGNOSIS — I25.708 CORONARY ARTERY DISEASE OF BYPASS GRAFT OF NATIVE HEART WITH STABLE ANGINA PECTORIS: Primary | Chronic | ICD-10-CM

## 2024-07-08 DIAGNOSIS — I34.0 NONRHEUMATIC MITRAL VALVE REGURGITATION: Chronic | ICD-10-CM

## 2024-07-08 DIAGNOSIS — N18.32 STAGE 3B CHRONIC KIDNEY DISEASE: Chronic | ICD-10-CM

## 2024-07-08 DIAGNOSIS — E66.3 OVERWEIGHT (BMI 25.0-29.9): ICD-10-CM

## 2024-07-08 DIAGNOSIS — E11.59 HYPERTENSION ASSOCIATED WITH DIABETES: Chronic | ICD-10-CM

## 2024-07-08 DIAGNOSIS — E78.00 HYPERCHOLESTEROLEMIA: Chronic | ICD-10-CM

## 2024-07-08 DIAGNOSIS — I15.2 HYPERTENSION ASSOCIATED WITH DIABETES: Chronic | ICD-10-CM

## 2024-07-08 PROCEDURE — 3288F FALL RISK ASSESSMENT DOCD: CPT | Mod: CPTII,S$GLB,, | Performed by: INTERNAL MEDICINE

## 2024-07-08 PROCEDURE — 99999 PR PBB SHADOW E&M-EST. PATIENT-LVL III: CPT | Mod: PBBFAC,,, | Performed by: INTERNAL MEDICINE

## 2024-07-08 PROCEDURE — 99214 OFFICE O/P EST MOD 30 MIN: CPT | Mod: S$GLB,,, | Performed by: INTERNAL MEDICINE

## 2024-07-08 PROCEDURE — 1159F MED LIST DOCD IN RCRD: CPT | Mod: CPTII,S$GLB,, | Performed by: INTERNAL MEDICINE

## 2024-07-08 PROCEDURE — 1111F DSCHRG MED/CURRENT MED MERGE: CPT | Mod: CPTII,S$GLB,, | Performed by: INTERNAL MEDICINE

## 2024-07-08 PROCEDURE — 1101F PT FALLS ASSESS-DOCD LE1/YR: CPT | Mod: CPTII,S$GLB,, | Performed by: INTERNAL MEDICINE

## 2024-07-08 PROCEDURE — 1126F AMNT PAIN NOTED NONE PRSNT: CPT | Mod: CPTII,S$GLB,, | Performed by: INTERNAL MEDICINE

## 2024-07-08 RX ORDER — ATORVASTATIN CALCIUM 40 MG/1
40 TABLET, FILM COATED ORAL DAILY
Qty: 90 TABLET | Refills: 1 | Status: SHIPPED | OUTPATIENT
Start: 2024-07-08

## 2024-07-08 NOTE — PROGRESS NOTES
Subjective:    Patient ID:  Dolores B LeJeune is a 88 y.o. female who presents for No chief complaint on file.        HPI    RECENT ADMISSION TO [facility] WITH GI BLEED ABDOMINAL PAIN, [facility] , + H PYLORI, WAS TRANSFUSED TWICE, LAST HEMOGLOBIN 9.1, GFR 43  Past Medical History:   Diagnosis Date    Acute MI     Anticoagulant long-term use     Carpal tunnel syndrome     Colon polyps     Coronary artery disease     DM type 2 (diabetes mellitus, type 2)     HTN (hypertension)     Hyperlipidemia     Osteoarthritis     Peripheral neuropathy      Past Surgical History:   Procedure Laterality Date    CAROTID STENT      1    CATARACT EXTRACTION W/  INTRAOCULAR LENS IMPLANT Right 2023    Dr Jessica Gant    CATARACT EXTRACTION W/  INTRAOCULAR LENS IMPLANT Left     [id number] Dr Jessica Gant    COLONOSCOPY  1/8/2008  Gurvinder    A 3 mm by 6 mm polyp in the cecum.  FRAGMENTS OF TUBULAR ADENOMA.    A 1 mm polyp in the recto-sigmoid colon.  HYPERPLASTIC POLYP.    Extremely redundant colon.      CORONARY ARTERY BYPASS GRAFT  01/24/2018    Dr. Onesimo TAI. LIMA to LAD, SVG to OM1, SVG to OM2, SVG to PDA    ESOPHAGOGASTRODUODENOSCOPY N/A 6/12/2024    Procedure: ESOPHAGOGASTRODUODENOSCOPY (EGD);  Surgeon: Ramy Hollins Jr., MD;  Location: [facility];  Service: Endoscopy;  Laterality: N/A;    EYE SURGERY      eye surgery    HYSTERECTOMY      ovaries remain    ptyregium      TONSILLECTOMY       Family History   Problem Relation Name Age of Onset    Tremor Mother      Hypertension Mother      Heart disease Mother      Stroke Father      Glaucoma Maternal Aunt      Macular degeneration Daughter  10        Youth Degeneration     Social History     Socioeconomic History    Marital status:     Number of children: 4   Tobacco Use    Smoking status: Former     Current packs/day: 0.00     Average packs/day: 0.5 packs/day for 16.2 years (8.1 ttl pk-yrs)     Types: Cigarettes      Start date:      Quit date: 3/15/1972     Years since quittin.3    Smokeless tobacco: Never   Substance and Sexual Activity    Alcohol use: No    Drug use: No     Social Determinants of Health     Food Insecurity: No Food Insecurity (2024)    Hunger Vital Sign     Worried About Running Out of Food in the Last Year: Never true     Ran Out of Food in the Last Year: Never true   Transportation Needs: No Transportation Needs (2024)    TRANSPORTATION NEEDS     Transportation : No   Housing Stability: Low Risk  (2024)    Housing Stability Vital Sign     Unable to Pay for Housing in the Last Year: No     Homeless in the Last Year: No       Review of patient's allergies indicates:   Allergen Reactions    Albuterol Other (See Comments)     Palpitations/tremor      Sulfa (sulfonamide antibiotics) Rash       Current Outpatient Medications:     calcium carbonate/vitamin D3 (VITAMIN D-3 ORAL), Take 1 capsule by mouth once daily., Disp: , Rfl:     clopidogreL (PLAVIX) 75 mg tablet, Take 1 tablet (75 mg total) by mouth once daily., Disp: , Rfl:     ferrous sulfate (FEOSOL) 325 mg (65 mg iron) Tab tablet, Take 2 tablets (650 mg total) by mouth every evening., Disp: 60 tablet, Rfl: 1    gabapentin (NEURONTIN) 300 MG capsule, TAKE 1 CAPSULE BY MOUTH DAILY (Patient taking differently: Take 300 mg by mouth every evening.), Disp: 90 capsule, Rfl: 3    metoprolol succinate (TOPROL-XL) 25 MG 24 hr tablet, Take 0.5 tablets (12.5 mg total) by mouth every evening., Disp: , Rfl:     mirabegron (MYRBETRIQ) 50 mg Tb24, Take 1 tablet (50 mg total) by mouth once daily., Disp: 90 tablet, Rfl: 2    pantoprazole (PROTONIX) 40 MG tablet, Take 1 tablet (40 mg total) by mouth 2 (two) times daily., Disp: 60 tablet, Rfl: 1    polyethylene glycol (MIRALAX) 17 gram/dose powder, Take 17 g by mouth daily as needed for Constipation., Disp: , Rfl:     repaglinide (PRANDIN) 1 MG tablet, Take 1 tablet (1 mg total) by mouth 3 (three)  "times daily before meals. (Patient taking differently: Take 1 mg by mouth 2 (two) times daily before meals.), Disp: 270 tablet, Rfl: 3    sucralfate (CARAFATE) 1 gram tablet, Take 1 tablet (1 g total) by mouth 4 (four) times daily., Disp: 120 tablet, Rfl: 0    atorvastatin (LIPITOR) 40 MG tablet, Take 1 tablet (40 mg total) by mouth once daily., Disp: 90 tablet, Rfl: 1    Review of Systems   Constitutional: Positive for malaise/fatigue. Negative for chills, diaphoresis, fever, night sweats and weight loss.   HENT:  Negative for congestion and nosebleeds.    Eyes:  Negative for blurred vision and visual disturbance.   Cardiovascular:  Negative for chest pain, claudication, cyanosis, dyspnea on exertion (MINIMAL), irregular heartbeat, leg swelling (OCC), near-syncope, orthopnea, palpitations, paroxysmal nocturnal dyspnea and syncope.   Respiratory:  Negative for cough, hemoptysis, shortness of breath and wheezing.    Endocrine: Negative for polydipsia and polyuria.   Hematologic/Lymphatic: Negative for adenopathy. Does not bruise/bleed easily.   Skin:  Negative for itching and rash.   Musculoskeletal:  Negative for back pain and falls.   Gastrointestinal:  Negative for abdominal pain (RESOLVED), change in bowel habit, dysphagia, jaundice, melena (DARK WITH FE) and nausea.   Genitourinary:  Negative for dysuria and flank pain.   Neurological:  Negative for brief paralysis, focal weakness, light-headedness, loss of balance, numbness and weakness.   Psychiatric/Behavioral:  Negative for altered mental status and depression.    Allergic/Immunologic: Negative for persistent infections.        Objective:      Vitals:    07/08/24 1427   BP: (!) 123/55   Pulse: 74   Weight: 66.6 kg (146 lb 13.2 oz)   Height: 4' 11" (1.499 m)   PainSc: 0-No pain     Body mass index is 29.66 kg/m².    Physical Exam  Constitutional:       Appearance: Normal appearance. She is overweight.   HENT:      Head: Normocephalic and atraumatic.   Eyes: "      General: No scleral icterus.     Extraocular Movements: Extraocular movements intact.   Neck:      Vascular: No carotid bruit.   Cardiovascular:      Rate and Rhythm: No extrasystoles are present.     Pulses:           Carotid pulses are 2+ on the right side and 2+ on the left side.       Radial pulses are 2+ on the right side and 2+ on the left side.      Heart sounds: Murmur heard.      Systolic murmur is present with a grade of 1/6 at the lower left sternal border and apex.      No friction rub. No gallop.   Abdominal:      Tenderness: There is no abdominal tenderness.   Musculoskeletal:      Cervical back: Neck supple.   Skin:     Capillary Refill: Capillary refill takes less than 2 seconds.      Coloration: Skin is pale.   Neurological:      General: No focal deficit present.      Mental Status: She is alert and oriented to person, place, and time.   Psychiatric:         Mood and Affect: Mood normal.         Speech: Speech normal.         Behavior: Behavior normal.                 ..    Chemistry        Component Value Date/Time     06/16/2024 0413    K 3.9 06/16/2024 0413     06/16/2024 0413    CO2 27 06/16/2024 0413    BUN 22 (H) 06/16/2024 0413    CREATININE 1.22 06/16/2024 0413     (H) 06/16/2024 0413        Component Value Date/Time    CALCIUM 8.5 06/16/2024 0413    ALKPHOS 74 06/12/2024 0357    AST 35 06/12/2024 0357    ALT 32 06/12/2024 0357    BILITOT 0.3 06/12/2024 0357    ESTGFRAFRICA 43.2 (A) 02/24/2022 0918    EGFRNONAA 37.5 (A) 02/24/2022 0918            ..  Lab Results   Component Value Date    CHOL 116 (L) 12/13/2023    CHOL 106 (L) 11/09/2023    CHOL 125 11/01/2022     Lab Results   Component Value Date    HDL 35 (L) 12/13/2023    HDL 37 (L) 11/09/2023    HDL 41 11/01/2022     Lab Results   Component Value Date    LDLCALC 61.8 (L) 12/13/2023    LDLCALC 53.0 (L) 11/09/2023    LDLCALC 65.8 11/01/2022     Lab Results   Component Value Date    TRIG 96 12/13/2023    TRIG 80  11/09/2023    TRIG 91 11/01/2022     Lab Results   Component Value Date    CHOLHDL 30.2 12/13/2023    CHOLHDL 34.9 11/09/2023    CHOLHDL 32.8 11/01/2022     ..  Lab Results   Component Value Date    WBC 9.24 06/20/2024    HGB 9.1 (L) 06/20/2024    HCT 28.2 (L) 06/20/2024    MCV 92 06/20/2024     06/20/2024       Test(s) Reviewed  I have reviewed the following in detail:  [] Stress test   [] Angiography   [] Echocardiogram   [x] Labs   [x] Other:       Assessment:         ICD-10-CM ICD-9-CM   1. 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 Nonrheumatic mitral valve regurgitation  I34.0 424.0   3. Hypercholesterolemia  E78.00 272.0   4. Hypertension associated with diabetes  E11.59 250.80    I15.2 401.9   5. Stage 3b chronic kidney disease  N18.32 585.3   6. Overweight (BMI 25.0-29.9)  E66.3 278.02   7. 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 - Primary    Nonrheumatic mitral valve regurgitation       Renal/    Stage 3b chronic kidney disease       Endocrine    Overweight (BMI 25.0-29.9)        Plan:     TAKE PLAVIX QOD, OFF ASA,ALL CV CLINICALLY STABLE, CLASS 1-2 ANGINA, NO HF, NO TIA, NO CLINICAL ARRHYTHMIA,CONTINUE CURRENT MEDS, EDUCATION, DIET, EXERCISE AS MUCH AS POSSIBLE, AVOID DEHYDRATION INCREASED CV RISK WITH CKD, SYMPTOMS IMPROVED MOSTLY NOW WITH ANEMIA THAT WILL TAKE SOME TIME TO RESOLVE SHE IS ON AN IRON SUPPLEMENT, DISCUSSED PLAN WITH THE PATIENT AND HER  DAUGHTER, RETURN TO CLINIC IN 9 MO      Coronary artery disease 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  Dispense: 90 tablet; Refill: 1    RTC Low level/low impact aerobic exercise 5x's/wk. Heart healthy diet and risk factor modification.    See labs and med orders.    Aerobic exercise 5x's/wk. Heart healthy diet and risk factor modification.    See labs and med orders.

## 2024-07-17 ENCOUNTER — OFFICE VISIT (OUTPATIENT)
Dept: PODIATRY | Facility: CLINIC | Age: 89
End: 2024-07-17
Payer: MEDICARE

## 2024-07-17 VITALS — BODY MASS INDEX: 29.6 KG/M2 | WEIGHT: 146.81 LBS | HEIGHT: 59 IN

## 2024-07-17 DIAGNOSIS — I73.9 PVD (PERIPHERAL VASCULAR DISEASE): Primary | ICD-10-CM

## 2024-07-17 DIAGNOSIS — E11.8 TYPE 2 DIABETES MELLITUS WITH COMPLICATION, WITHOUT LONG-TERM CURRENT USE OF INSULIN: ICD-10-CM

## 2024-07-17 DIAGNOSIS — B35.1 ONYCHOMYCOSIS DUE TO DERMATOPHYTE: ICD-10-CM

## 2024-07-17 DIAGNOSIS — I87.2 VENOUS INSUFFICIENCY (CHRONIC) (PERIPHERAL): ICD-10-CM

## 2024-07-17 PROCEDURE — 99499 UNLISTED E&M SERVICE: CPT | Mod: S$GLB,,, | Performed by: PODIATRIST

## 2024-07-17 PROCEDURE — 11721 DEBRIDE NAIL 6 OR MORE: CPT | Mod: Q9,S$GLB,, | Performed by: PODIATRIST

## 2024-07-17 PROCEDURE — 99999 PR PBB SHADOW E&M-EST. PATIENT-LVL III: CPT | Mod: PBBFAC,,, | Performed by: PODIATRIST

## 2024-07-17 NOTE — PROGRESS NOTES
Subjective:      Patient ID: Dolores B LeJeune is a 89 y.o. female.    Chief Complaint: Nail Care      Kelly is a 89 y.o. female witha past medical history of Acute MI, Anticoagulant long-term use, Carpal tunnel syndrome, Cataracts, bilateral, Colon polyps, Coronary artery disease, DM type 2 (diabetes mellitus, type 2), HTN (hypertension), Hyperlipidemia, Osteoarthritis, and Peripheral neuropathy. The patient's chief complaint consists of toenails that are in need of trimming.  Denies experiencing pain from the nails with today's exam.  Has not attempted to self treat.  Notes the usual edema within the Rt. Lower extremity.  Denies this being associated with symptoms of discomfort.  Denies noticing venous stasis dermatitis or venous blistering.   Denies any additional pedal complaints.      PCP: Silvia Soto MD  Last visit: 6/24  Past Medical History:   Diagnosis Date    Acute MI     Anticoagulant long-term use     Carpal tunnel syndrome     Colon polyps     Coronary artery disease     DM type 2 (diabetes mellitus, type 2)     HTN (hypertension)     Hyperlipidemia     Osteoarthritis     Peripheral neuropathy        Past Surgical History:   Procedure Laterality Date    CAROTID STENT      1    CATARACT EXTRACTION W/  INTRAOCULAR LENS IMPLANT Right 2023    Dr Jessica Gant    CATARACT EXTRACTION W/  INTRAOCULAR LENS IMPLANT Left     [id number] Dr Jessica Gant    COLONOSCOPY  1/8/2008  Gurvinder    A 3 mm by 6 mm polyp in the cecum.  FRAGMENTS OF TUBULAR ADENOMA.    A 1 mm polyp in the recto-sigmoid colon.  HYPERPLASTIC POLYP.    Extremely redundant colon.      [facility], SVG to OM1, SVG to OM2, SVG to PDA    ESOPHAGOGASTRODUODENOSCOPY N/A 6/12/2024    Procedure: ESOPHAGOGASTRODUODENOSCOPY (EGD);  Surgeon: Ramy Hollins Jr., MD;  Location: [facility];  Service: Endoscopy;  Laterality: N/A;    EYE SURGERY      eye surgery    HYSTERECTOMY      ovaries remain     ptyregium      TONSILLECTOMY         Family History   Problem Relation Name Age of Onset    Tremor Mother      Hypertension Mother      Heart disease Mother      Stroke Father      Glaucoma Maternal Aunt      Macular degeneration Daughter  10        Youth Degeneration       Social History     Socioeconomic History    Marital status:     Number of children: 4   Tobacco Use    Smoking status: Former     Current packs/day: 0.00     Average packs/day: 0.5 packs/day for 16.2 years (8.1 ttl pk-yrs)     Types: Cigarettes     Start date:      Quit date: 3/15/1972     Years since quittin.3    Smokeless tobacco: Never   Substance and Sexual Activity    Alcohol use: No    Drug use: No     Social Determinants of Health     Food Insecurity: No Food Insecurity (2024)    Hunger Vital Sign     Worried About Running Out of Food in the Last Year: Never true     Ran Out of Food in the Last Year: Never true   Transportation Needs: No Transportation Needs (2024)    TRANSPORTATION NEEDS     Transportation : No   Housing Stability: Low Risk  (2024)    Housing Stability Vital Sign     Unable to Pay for Housing in the Last Year: No     Homeless in the Last Year: No       Current Outpatient Medications   Medication Sig Dispense Refill    atorvastatin (LIPITOR) 40 MG tablet Take 1 tablet (40 mg total) by mouth once daily. 90 tablet 1    calcium carbonate/vitamin D3 (VITAMIN D-3 ORAL) Take 1 capsule by mouth once daily.      clopidogreL (PLAVIX) 75 mg tablet Take 1 tablet (75 mg total) by mouth once daily.      ferrous sulfate (FEOSOL) 325 mg (65 mg iron) Tab tablet Take 2 tablets (650 mg total) by mouth every evening. 60 tablet 1    gabapentin (NEURONTIN) 300 MG capsule TAKE 1 CAPSULE BY MOUTH DAILY (Patient taking differently: Take 300 mg by mouth every evening.) 90 capsule 3    metoprolol succinate (TOPROL-XL) 25 MG 24 hr tablet Take 0.5 tablets (12.5 mg total) by mouth every evening.      mirabegron  (MYRBETRIQ) 50 mg Tb24 Take 1 tablet (50 mg total) by mouth once daily. 90 tablet 2    pantoprazole (PROTONIX) 40 MG tablet Take 1 tablet (40 mg total) by mouth 2 (two) times daily. 60 tablet 1    polyethylene glycol (MIRALAX) 17 gram/dose powder Take 17 g by mouth daily as needed for Constipation.      repaglinide (PRANDIN) 1 MG tablet Take 1 tablet (1 mg total) by mouth 3 (three) times daily before meals. (Patient taking differently: Take 1 mg by mouth 2 (two) times daily before meals.) 270 tablet 3     No current facility-administered medications for this visit.       Review of patient's allergies indicates:   Allergen Reactions    Albuterol Other (See Comments)     Palpitations/tremor      Sulfa (sulfonamide antibiotics) Rash       Hemoglobin A1C   Date Value Ref Range Status   05/07/2024 7.3 (H) 4.0 - 5.6 % Final     Comment:     ADA Screening Guidelines:  5.7-6.4%  Consistent with prediabetes  >or=6.5%  Consistent with diabetes    High levels of fetal hemoglobin interfere with the HbA1C  assay. Heterozygous hemoglobin variants (HbS, HgC, etc)do  not significantly interfere with this assay.   However, presence of multiple variants may affect accuracy.     02/14/2024 7.3 (H) 4.0 - 5.6 % Final     Comment:     ADA Screening Guidelines:  5.7-6.4%  Consistent with prediabetes  >or=6.5%  Consistent with diabetes    High levels of fetal hemoglobin interfere with the HbA1C  assay. Heterozygous hemoglobin variants (HbS, HgC, etc)do  not significantly interfere with this assay.   However, presence of multiple variants may affect accuracy.     11/09/2023 8.0 (H) 4.0 - 5.6 % Final     Comment:     ADA Screening Guidelines:  5.7-6.4%  Consistent with prediabetes  >or=6.5%  Consistent with diabetes    High levels of fetal hemoglobin interfere with the HbA1C  assay. Heterozygous hemoglobin variants (HbS, HgC, etc)do  not significantly interfere with this assay.   However, presence of multiple variants may affect accuracy.          Review of Systems   Constitutional: Negative for chills and fever.   Cardiovascular:  Positive for leg swelling. Negative for claudication.   Skin:  Positive for color change and nail changes.   Musculoskeletal:  Positive for joint swelling. Negative for joint pain, muscle cramps and muscle weakness.   Gastrointestinal:  Negative for nausea and vomiting.   Neurological:  Negative for numbness and paresthesias.   Psychiatric/Behavioral:  Negative for altered mental status.            Objective:      Physical Exam  Constitutional:       Appearance: Normal appearance. She is not ill-appearing.   Cardiovascular:      Pulses:           Dorsalis pedis pulses are 1+ on the right side and 1+ on the left side.        Posterior tibial pulses are 1+ on the right side and 1+ on the left side.      Comments: CFT is 3 seconds bilateral.  Pedal hair growth is absent bilateral.  Varicosities noted bilateral.  Moderate non pitting lower extremity edema noted bilateral.  Toes are cool to touch bilateral.    Musculoskeletal:         General: Swelling present. No tenderness or signs of injury.      Comments: Muscle strength 5/5 in all muscle groups bilateral.  No tenderness nor crepitation with ROM of foot/ankle joints bilateral.  (-) for pain with palpation of bilateral foot and ankle.   Skin:     General: Skin is warm and dry.      Capillary Refill: Capillary refill takes more than 3 seconds.      Findings: No bruising, ecchymosis, erythema, signs of injury, laceration, lesion, petechiae, rash or wound.      Comments: Increased pedal turgor noted bilateral.  Toenails x 10 appear thickened by 2 mm, elongated by 5 mm, and discolored with subungual debris.     Neurological:      General: No focal deficit present.      Mental Status: She is alert.      Sensory: No sensory deficit.      Motor: No weakness or atrophy.      Comments: Light touch is intact bilateral.                 Assessment:       Encounter Diagnoses   Name Primary?     PVD (peripheral vascular disease) Yes    Type 2 diabetes mellitus with complication, without long-term current use of insulin     Venous insufficiency (chronic) (peripheral)     Onychomycosis due to dermatophyte              Plan:       Kelly was seen today for nail care.    Diagnoses and all orders for this visit:    PVD (peripheral vascular disease)    Type 2 diabetes mellitus with complication, without long-term current use of insulin    Venous insufficiency (chronic) (peripheral)    Onychomycosis due to dermatophyte          I counseled the patient on her conditions, their implications and medical management.    PVD and venous insufficiency remains stable with today's exam.    Patient instructed on proper foot hygeine. We discussed wearing proper shoe gear, daily foot inspections, never walking without protective shoe gear, never putting sharp instruments to feet    With patient's permission, nails were aggressively reduced and debrided x 10 to their soft tissue attachment mechanically and with electric , removing all offending nail and debris. Patient relates relief following the procedure. She will continue to monitor the areas daily, inspect her feet, wear protective shoe gear when ambulatory, moisturizer to maintain skin integrity and follow in this office in approximately 3 months, sooner p.r.n.    RTC for routine high risk exam in three months.      Lucian Fernandez DPM

## 2024-07-18 ENCOUNTER — EXTERNAL HOME HEALTH (OUTPATIENT)
Dept: HOME HEALTH SERVICES | Facility: HOSPITAL | Age: 89
End: 2024-07-18
Payer: MEDICARE

## 2024-07-19 DIAGNOSIS — N39.41 URINARY INCONTINENCE, URGE: ICD-10-CM

## 2024-07-19 RX ORDER — MIRABEGRON 50 MG/1
50 TABLET, EXTENDED RELEASE ORAL
Qty: 90 TABLET | Refills: 3 | Status: SHIPPED | OUTPATIENT
Start: 2024-07-19

## 2024-07-19 NOTE — TELEPHONE ENCOUNTER
Refill Routing Note   Medication(s) are not appropriate for processing by [facility] for the following reason(s):        No active prescription written by provider    [facility] action(s):  Defer             Appointments  past 12m or future 3m with PCP    Date Provider   Last Visit   6/21/2024 BEBO Soto MD   Next Visit   11/14/2024 BEBO Soto MD   ED visits in past 90 days: 0        Note composed:5:09 PM 07/19/2024

## 2024-07-19 NOTE — TELEPHONE ENCOUNTER
No care due was identified.  [facility] Embedded Care Due Messages. Reference number: [id number].   7/19/2024 3:01:27 PM CDT

## 2024-07-20 DIAGNOSIS — N39.41 URINARY INCONTINENCE, URGE: ICD-10-CM

## 2024-07-20 RX ORDER — MIRABEGRON 50 MG/1
50 TABLET, EXTENDED RELEASE ORAL
Qty: 90 TABLET | Refills: 3 | OUTPATIENT
Start: 2024-07-20

## 2024-07-20 NOTE — TELEPHONE ENCOUNTER
No care due was identified.  [facility] Embedded Care Due Messages. Reference number: [id number].   7/20/2024 5:31:26 PM CDT

## 2024-07-21 NOTE — TELEPHONE ENCOUNTER
Refill Decision Note   Kelly Mattaune  is requesting a refill authorization.  Brief Assessment and Rationale for Refill:  Quick Discontinue     Medication Therapy Plan: duplicate signed 7/19/24      Comments:     Note composed:7:12 PM 07/20/2024

## 2024-07-23 ENCOUNTER — TELEPHONE (OUTPATIENT)
Dept: FAMILY MEDICINE | Facility: CLINIC | Age: 89
End: 2024-07-23
Payer: MEDICARE

## 2024-07-23 DIAGNOSIS — E11.21 TYPE 2 DIABETES MELLITUS WITH DIABETIC NEPHROPATHY, WITHOUT LONG-TERM CURRENT USE OF INSULIN: Primary | ICD-10-CM

## 2024-07-23 NOTE — TELEPHONE ENCOUNTER
----- Message from Edmond Root sent at 7/23/2024 11:39 AM CDT -----  Contact: Self  Type: Needs Medical Advice  Who Called:  Joycelyn/Daughter    Pharmacy name and phone #:    [id number] - [place], [place] - [address] AT [facility] [address] [address]  [address] [address]  Phone: [phone] Fax: [phone]    Best Call Back Number: [phone]  Additional Information: Joycelyn is requesting a refill for the patients test strips, and she is requesting a call back as well.

## 2024-07-24 ENCOUNTER — DOCUMENT SCAN (OUTPATIENT)
Dept: HOME HEALTH SERVICES | Facility: HOSPITAL | Age: 89
End: 2024-07-24
Payer: MEDICARE

## 2024-08-03 DIAGNOSIS — I10 PRIMARY HYPERTENSION: ICD-10-CM

## 2024-08-05 RX ORDER — METOPROLOL SUCCINATE 25 MG/1
12.5 TABLET, EXTENDED RELEASE ORAL
Qty: 45 TABLET | Refills: 3 | Status: SHIPPED | OUTPATIENT
Start: 2024-08-05

## 2024-08-12 ENCOUNTER — TELEPHONE (OUTPATIENT)
Dept: FAMILY MEDICINE | Facility: CLINIC | Age: 89
End: 2024-08-12
Payer: MEDICARE

## 2024-08-12 DIAGNOSIS — D50.9 IRON DEFICIENCY ANEMIA, UNSPECIFIED IRON DEFICIENCY ANEMIA TYPE: Primary | ICD-10-CM

## 2024-08-12 RX ORDER — FERROUS SULFATE 325(65) MG
325 TABLET ORAL NIGHTLY
Qty: 60 TABLET | Refills: 1 | Status: SHIPPED | OUTPATIENT
Start: 2024-08-12

## 2024-08-12 NOTE — TELEPHONE ENCOUNTER
----- Message from Saamra Helton, Patient Care Assistant sent at 8/12/2024 10:51 AM CDT -----  Type: Needs Medical Advice  Who Called:  jeannette Mejia Call Back Number: [phone]    Pharmacy name :       [id number] - [place], [place] - [address] [facility] [address] [address]  [address] [place] [address]  Phone: [phone] Fax: [phone]        Additional Information: [facility] nurse wants to know if patient needs to continue  to stay on iron and needs a new script , needs to restart nursing , 1 x weekly . Please call nurse to further discuss , thank you .

## 2024-08-13 NOTE — TELEPHONE ENCOUNTER
Spoke to pt and pt was advised to continue iron treatment and new prescription was sent to pharmacy

## 2024-08-16 ENCOUNTER — DOCUMENT SCAN (OUTPATIENT)
Dept: HOME HEALTH SERVICES | Facility: HOSPITAL | Age: 89
End: 2024-08-16
Payer: MEDICARE

## 2024-09-11 ENCOUNTER — EXTERNAL HOME HEALTH (OUTPATIENT)
Dept: HOME HEALTH SERVICES | Facility: HOSPITAL | Age: 89
End: 2024-09-11
Payer: MEDICARE

## 2024-09-16 PROBLEM — K92.2 GI BLEED: Status: RESOLVED | Noted: 2024-06-12 | Resolved: 2024-09-16

## 2024-09-19 DIAGNOSIS — Z79.02 LONG TERM (CURRENT) USE OF ANTITHROMBOTICS/ANTIPLATELETS: ICD-10-CM

## 2024-09-19 RX ORDER — CLOPIDOGREL BISULFATE 75 MG/1
75 TABLET ORAL
Qty: 90 TABLET | Refills: 0 | Status: SHIPPED | OUTPATIENT
Start: 2024-09-19

## 2024-09-23 DIAGNOSIS — K21.9 GASTROESOPHAGEAL REFLUX DISEASE, UNSPECIFIED WHETHER ESOPHAGITIS PRESENT: Primary | ICD-10-CM

## 2024-09-23 RX ORDER — PANTOPRAZOLE SODIUM 40 MG/1
40 TABLET, DELAYED RELEASE ORAL 2 TIMES DAILY
Qty: 90 TABLET | Refills: 3 | Status: SHIPPED | OUTPATIENT
Start: 2024-09-23

## 2024-09-23 NOTE — TELEPHONE ENCOUNTER
----- Message from Lisa Hunter sent at 9/23/2024  9:40 AM CDT -----  Regarding: Refill  Type:  RX Refill Request    Who Called: Nurse Hamida / Home Health     Refill or New Rx:Refill    RX Name and Strength:pantoprazole (PROTONIX) 40 MG tablet     How is the patient currently taking it? (ex. 1XDay):2xday    Is this a 30 day or 90 day RX:90    Preferred Pharmacy with phone number:    [id number] - [place], [place] - [address] AT [facility] [address] [address]  [address] [facility] [place] [address]  Phone: [phone] Fax: [phone]    Local or Mail Order:Local      Would the patient rather a call back or a [facility]? Call back    Best Call Back Number:[phone]    Additional Information: Thank you

## 2024-10-08 ENCOUNTER — DOCUMENT SCAN (OUTPATIENT)
Dept: HOME HEALTH SERVICES | Facility: HOSPITAL | Age: 89
End: 2024-10-08
Payer: MEDICARE

## 2024-10-17 ENCOUNTER — OFFICE VISIT (OUTPATIENT)
Dept: PODIATRY | Facility: CLINIC | Age: 89
End: 2024-10-17
Payer: MEDICARE

## 2024-10-17 DIAGNOSIS — B35.1 ONYCHOMYCOSIS DUE TO DERMATOPHYTE: ICD-10-CM

## 2024-10-17 DIAGNOSIS — I87.2 VENOUS INSUFFICIENCY (CHRONIC) (PERIPHERAL): ICD-10-CM

## 2024-10-17 DIAGNOSIS — I73.9 PVD (PERIPHERAL VASCULAR DISEASE): ICD-10-CM

## 2024-10-17 DIAGNOSIS — E11.8 TYPE 2 DIABETES MELLITUS WITH COMPLICATION, WITHOUT LONG-TERM CURRENT USE OF INSULIN: Primary | ICD-10-CM

## 2024-10-17 PROCEDURE — 99999 PR PBB SHADOW E&M-EST. PATIENT-LVL II: CPT | Mod: PBBFAC,,, | Performed by: PODIATRIST

## 2024-11-08 PROBLEM — Z87.11 HISTORY OF GASTRIC ULCER: Status: ACTIVE | Noted: 2024-11-08

## 2024-11-11 ENCOUNTER — LAB VISIT (OUTPATIENT)
Dept: LAB | Facility: HOSPITAL | Age: 89
End: 2024-11-11
Attending: FAMILY MEDICINE
Payer: MEDICARE

## 2024-11-11 DIAGNOSIS — N18.32 STAGE 3B CHRONIC KIDNEY DISEASE: ICD-10-CM

## 2024-11-11 DIAGNOSIS — D63.1 ANEMIA OF CHRONIC KIDNEY FAILURE, STAGE 3 (MODERATE): ICD-10-CM

## 2024-11-11 DIAGNOSIS — E11.59 HYPERTENSION ASSOCIATED WITH DIABETES: ICD-10-CM

## 2024-11-11 DIAGNOSIS — N18.30 ANEMIA OF CHRONIC KIDNEY FAILURE, STAGE 3 (MODERATE): ICD-10-CM

## 2024-11-11 DIAGNOSIS — E11.9 CONTROLLED TYPE 2 DIABETES MELLITUS WITHOUT COMPLICATION, WITHOUT LONG-TERM CURRENT USE OF INSULIN: ICD-10-CM

## 2024-11-11 DIAGNOSIS — I15.2 HYPERTENSION ASSOCIATED WITH DIABETES: ICD-10-CM

## 2024-11-11 LAB
ALBUMIN SERPL BCP-MCNC: 3.5 G/DL (ref 3.5–5.2)
ALP SERPL-CCNC: 87 U/L (ref 40–150)
ALT SERPL W/O P-5'-P-CCNC: 19 U/L (ref 10–44)
ANION GAP SERPL CALC-SCNC: 8 MMOL/L (ref 8–16)
AST SERPL-CCNC: 23 U/L (ref 10–40)
BASOPHILS # BLD AUTO: 0.03 K/UL (ref 0–0.2)
BASOPHILS NFR BLD: 0.3 % (ref 0–1.9)
BILIRUB SERPL-MCNC: 0.6 MG/DL (ref 0.1–1)
BUN SERPL-MCNC: 26 MG/DL (ref 8–23)
CALCIUM SERPL-MCNC: 8.7 MG/DL (ref 8.7–10.5)
CHLORIDE SERPL-SCNC: 106 MMOL/L (ref 95–110)
CHOLEST SERPL-MCNC: 123 MG/DL (ref 120–199)
CHOLEST/HDLC SERPL: 3.6 {RATIO} (ref 2–5)
CO2 SERPL-SCNC: 25 MMOL/L (ref 23–29)
CREAT SERPL-MCNC: 1.5 MG/DL (ref 0.5–1.4)
DIFFERENTIAL METHOD BLD: ABNORMAL
EOSINOPHIL # BLD AUTO: 0.2 K/UL (ref 0–0.5)
EOSINOPHIL NFR BLD: 1.9 % (ref 0–8)
ERYTHROCYTE [DISTWIDTH] IN BLOOD BY AUTOMATED COUNT: 14.1 % (ref 11.5–14.5)
EST. GFR  (NO RACE VARIABLE): 33.3 ML/MIN/1.73 M^2
ESTIMATED AVG GLUCOSE: 166 MG/DL (ref 68–131)
GLUCOSE SERPL-MCNC: 164 MG/DL (ref 70–110)
HBA1C MFR BLD: 7.4 % (ref 4–5.6)
HCT VFR BLD AUTO: 35.7 % (ref 37–48.5)
HDLC SERPL-MCNC: 34 MG/DL (ref 40–75)
HDLC SERPL: 27.6 % (ref 20–50)
HGB BLD-MCNC: 11.4 G/DL (ref 12–16)
IMM GRANULOCYTES # BLD AUTO: 0.03 K/UL (ref 0–0.04)
IMM GRANULOCYTES NFR BLD AUTO: 0.3 % (ref 0–0.5)
LDLC SERPL CALC-MCNC: 65.6 MG/DL (ref 63–159)
LYMPHOCYTES # BLD AUTO: 2.4 K/UL (ref 1–4.8)
LYMPHOCYTES NFR BLD: 24.6 % (ref 18–48)
MCH RBC QN AUTO: 29.5 PG (ref 27–31)
MCHC RBC AUTO-ENTMCNC: 31.9 G/DL (ref 32–36)
MCV RBC AUTO: 93 FL (ref 82–98)
MONOCYTES # BLD AUTO: 0.7 K/UL (ref 0.3–1)
MONOCYTES NFR BLD: 7 % (ref 4–15)
NEUTROPHILS # BLD AUTO: 6.3 K/UL (ref 1.8–7.7)
NEUTROPHILS NFR BLD: 65.9 % (ref 38–73)
NONHDLC SERPL-MCNC: 89 MG/DL
NRBC BLD-RTO: 0 /100 WBC
PLATELET # BLD AUTO: 213 K/UL (ref 150–450)
PMV BLD AUTO: 11.8 FL (ref 9.2–12.9)
POTASSIUM SERPL-SCNC: 5.4 MMOL/L (ref 3.5–5.1)
PROT SERPL-MCNC: 7.1 G/DL (ref 6–8.4)
RBC # BLD AUTO: 3.86 M/UL (ref 4–5.4)
SODIUM SERPL-SCNC: 139 MMOL/L (ref 136–145)
TRIGL SERPL-MCNC: 117 MG/DL (ref 30–150)
WBC # BLD AUTO: 9.59 K/UL (ref 3.9–12.7)

## 2024-11-11 PROCEDURE — 85025 COMPLETE CBC W/AUTO DIFF WBC: CPT | Performed by: FAMILY MEDICINE

## 2024-11-11 PROCEDURE — 80061 LIPID PANEL: CPT | Performed by: FAMILY MEDICINE

## 2024-11-11 PROCEDURE — 80053 COMPREHEN METABOLIC PANEL: CPT | Performed by: FAMILY MEDICINE

## 2024-11-11 PROCEDURE — 83036 HEMOGLOBIN GLYCOSYLATED A1C: CPT | Performed by: FAMILY MEDICINE

## 2024-11-11 PROCEDURE — 36415 COLL VENOUS BLD VENIPUNCTURE: CPT | Mod: PO | Performed by: FAMILY MEDICINE

## 2024-11-12 ENCOUNTER — TELEPHONE (OUTPATIENT)
Dept: FAMILY MEDICINE | Facility: CLINIC | Age: 89
End: 2024-11-12
Payer: MEDICARE

## 2024-11-22 DIAGNOSIS — D50.9 IRON DEFICIENCY ANEMIA, UNSPECIFIED IRON DEFICIENCY ANEMIA TYPE: ICD-10-CM

## 2024-11-22 RX ORDER — FERROUS SULFATE 325(65) MG
TABLET ORAL NIGHTLY
Qty: 60 TABLET | Refills: 1 | Status: SHIPPED | OUTPATIENT
Start: 2024-11-22

## 2024-11-22 NOTE — TELEPHONE ENCOUNTER
Refill Routing Note   Medication(s) are not appropriate for processing by [facility] for the following reason(s):        Outside of protocol    [facility] action(s):  Route               Appointments  past 12m or future 3m with PCP    Date Provider   Last Visit   6/21/2024 BEBO Soto MD   Next Visit   12/9/2024 BEBO Soto MD   ED visits in past 90 days: 0        Note composed:3:50 PM 11/22/2024

## 2024-12-09 ENCOUNTER — OFFICE VISIT (OUTPATIENT)
Dept: FAMILY MEDICINE | Facility: CLINIC | Age: 89
End: 2024-12-09
Payer: MEDICARE

## 2024-12-09 VITALS
DIASTOLIC BLOOD PRESSURE: 60 MMHG | HEART RATE: 97 BPM | BODY MASS INDEX: 30.05 KG/M2 | HEIGHT: 59 IN | SYSTOLIC BLOOD PRESSURE: 116 MMHG | OXYGEN SATURATION: 97 % | WEIGHT: 149.06 LBS

## 2024-12-09 DIAGNOSIS — E11.9 CONTROLLED TYPE 2 DIABETES MELLITUS WITHOUT COMPLICATION, WITHOUT LONG-TERM CURRENT USE OF INSULIN: ICD-10-CM

## 2024-12-09 DIAGNOSIS — I15.2 HYPERTENSION ASSOCIATED WITH DIABETES: ICD-10-CM

## 2024-12-09 DIAGNOSIS — E78.5 TYPE 2 DIABETES MELLITUS WITH HYPERLIPIDEMIA: Primary | ICD-10-CM

## 2024-12-09 DIAGNOSIS — D50.9 IRON DEFICIENCY ANEMIA, UNSPECIFIED IRON DEFICIENCY ANEMIA TYPE: ICD-10-CM

## 2024-12-09 DIAGNOSIS — N18.32 STAGE 3B CHRONIC KIDNEY DISEASE: ICD-10-CM

## 2024-12-09 DIAGNOSIS — Z95.1 S/P CABG (CORONARY ARTERY BYPASS GRAFT): ICD-10-CM

## 2024-12-09 DIAGNOSIS — E11.69 TYPE 2 DIABETES MELLITUS WITH HYPERLIPIDEMIA: Primary | ICD-10-CM

## 2024-12-09 DIAGNOSIS — Z78.0 POST-MENOPAUSAL: ICD-10-CM

## 2024-12-09 DIAGNOSIS — E11.59 HYPERTENSION ASSOCIATED WITH DIABETES: ICD-10-CM

## 2024-12-09 DIAGNOSIS — Z12.31 ENCOUNTER FOR SCREENING MAMMOGRAM FOR MALIGNANT NEOPLASM OF BREAST: ICD-10-CM

## 2024-12-09 DIAGNOSIS — F41.9 ANXIETY: ICD-10-CM

## 2024-12-09 PROCEDURE — 3288F FALL RISK ASSESSMENT DOCD: CPT | Mod: CPTII,S$GLB,, | Performed by: FAMILY MEDICINE

## 2024-12-09 PROCEDURE — 99214 OFFICE O/P EST MOD 30 MIN: CPT | Mod: S$GLB,,, | Performed by: FAMILY MEDICINE

## 2024-12-09 PROCEDURE — 1160F RVW MEDS BY RX/DR IN RCRD: CPT | Mod: CPTII,S$GLB,, | Performed by: FAMILY MEDICINE

## 2024-12-09 PROCEDURE — 1126F AMNT PAIN NOTED NONE PRSNT: CPT | Mod: CPTII,S$GLB,, | Performed by: FAMILY MEDICINE

## 2024-12-09 PROCEDURE — G2211 COMPLEX E/M VISIT ADD ON: HCPCS | Mod: S$GLB,,, | Performed by: FAMILY MEDICINE

## 2024-12-09 PROCEDURE — 99999 PR PBB SHADOW E&M-EST. PATIENT-LVL IV: CPT | Mod: PBBFAC,,, | Performed by: FAMILY MEDICINE

## 2024-12-09 PROCEDURE — 1101F PT FALLS ASSESS-DOCD LE1/YR: CPT | Mod: CPTII,S$GLB,, | Performed by: FAMILY MEDICINE

## 2024-12-09 PROCEDURE — 1157F ADVNC CARE PLAN IN RCRD: CPT | Mod: CPTII,S$GLB,, | Performed by: FAMILY MEDICINE

## 2024-12-09 PROCEDURE — 1159F MED LIST DOCD IN RCRD: CPT | Mod: CPTII,S$GLB,, | Performed by: FAMILY MEDICINE

## 2024-12-09 RX ORDER — FERROUS SULFATE 325(65) MG
325 TABLET ORAL NIGHTLY
Qty: 90 TABLET | Refills: 1 | Status: SHIPPED | OUTPATIENT
Start: 2024-12-09

## 2024-12-09 RX ORDER — ESCITALOPRAM OXALATE 5 MG/1
5 TABLET ORAL DAILY
Qty: 30 TABLET | Refills: 3 | Status: SHIPPED | OUTPATIENT
Start: 2024-12-09 | End: 2025-12-09

## 2024-12-09 NOTE — PROGRESS NOTES
Subjective:       Patient ID: Dolores B LeJeune is a 89 y.o. female.    Chief Complaint: No chief complaint on file.    Pt is known to me.  Pt is here for followup of chronic medical issues.  The pt is under family stress.  She is anxious.  She agrees to try a low dose medication.  Otherwise she is doing well.  She does report lower back pain that she is managing at home.  We discussed the pt's recent labs.  We are watching renal function.  Discussed health maintenance with pt and will schedule appropriate studies and/or immunizations.        Review of Systems    Objective:      Physical Exam  Vitals and nursing note reviewed.   Constitutional:       General: She is not in acute distress.     Appearance: Normal appearance. She is well-developed. She is obese. She is not ill-appearing.   HENT:      Head: Normocephalic.   Eyes:      Conjunctiva/sclera: Conjunctivae normal.      Pupils: Pupils are equal, round, and reactive to light.   Neck:      Thyroid: No thyromegaly.   Cardiovascular:      Rate and Rhythm: Normal rate and regular rhythm.      Pulses: Normal pulses.      Heart sounds: Normal heart sounds.   Pulmonary:      Effort: Pulmonary effort is normal.      Breath sounds: Normal breath sounds.   Abdominal:      General: Bowel sounds are normal.      Palpations: Abdomen is soft.      Tenderness: There is no abdominal tenderness.   Musculoskeletal:         General: No tenderness or deformity. Normal range of motion.      Cervical back: Normal range of motion and neck supple.      Right lower leg: No edema.      Left lower leg: No edema.   Lymphadenopathy:      Cervical: No cervical adenopathy.   Skin:     General: Skin is warm and dry.   Neurological:      Mental Status: She is alert and oriented to person, place, and time.      Cranial Nerves: No cranial nerve deficit.      Motor: No abnormal muscle tone.      Coordination: Coordination normal.      Deep Tendon Reflexes: Reflexes normal.   Psychiatric:          Behavior: Behavior normal.         Assessment:       1. Type 2 diabetes mellitus with hyperlipidemia    2. Post-menopausal    3. Hypertension associated with diabetes    4. S/P CABG (coronary artery bypass graft)    5. Stage 3b chronic kidney disease    6. Controlled type 2 diabetes mellitus without complication, without long-term current use of insulin    7. Anxiety    8. Encounter for screening mammogram for malignant neoplasm of breast    9. Iron deficiency anemia, unspecified iron deficiency anemia type        Plan:       Diagnoses and all orders for this visit:    Type 2 diabetes mellitus with hyperlipidemia  -     Microalbumin/Creatinine Ratio, Urine; Future    Post-menopausal  -     DXA Bone Density Axial Skeleton 1 or more sites; Future    Hypertension associated with diabetes    S/P CABG (coronary artery bypass graft)    Stage 3b chronic kidney disease    Controlled type 2 diabetes mellitus without complication, without long-term current use of insulin  -     Microalbumin/Creatinine Ratio, Urine; Future  -     Ambulatory referral/consult to Optometry; Future    Anxiety  Comments:  Pt to let me know about symptoms in about 6 weeks.  Orders:  -     EScitalopram oxalate (LEXAPRO) 5 MG Tab; Take 1 tablet (5 mg total) by mouth once daily.    Encounter for screening mammogram for malignant neoplasm of breast  -     Mammo Digital Screening Bilat w/ Sd; Future    Iron deficiency anemia, unspecified iron deficiency anemia type  -     ferrous sulfate (FEROSUL) 325 mg (65 mg iron) Tab tablet; Take 1 tablet (325 mg total) by mouth every evening.      During this visit, I reviewed the pt's history, medications, allergies, and problem list.     Visit today included increased complexity associated with the care of the episodic problem anxiety addressed and managing the longitudinal care of the patient due to the serious and/or complex managed problem(s) HTN, HLD, DM CKD.

## 2024-12-10 ENCOUNTER — TELEPHONE (OUTPATIENT)
Dept: FAMILY MEDICINE | Facility: CLINIC | Age: 89
End: 2024-12-10
Payer: MEDICARE

## 2024-12-10 NOTE — TELEPHONE ENCOUNTER
----- Message from Rachel sent at 12/10/2024  3:32 PM CST -----  Contact: Joycelyn Nowak  Type:  Patient Returning Call    Who Called:  Joycelyn Nowak  Who Left Message for Patient: Don't know  Does the patient know what this is regarding?:  Urine results    Would the patient rather a call back or a response via [facility]?   Call back  Best Call Back Number:   [phone]    Additional Information:  States they are returning a missed call - please call back - thank you

## 2024-12-22 DIAGNOSIS — Z79.02 LONG TERM (CURRENT) USE OF ANTITHROMBOTICS/ANTIPLATELETS: ICD-10-CM

## 2024-12-23 RX ORDER — CLOPIDOGREL BISULFATE 75 MG/1
75 TABLET ORAL
Qty: 90 TABLET | Refills: 0 | Status: SHIPPED | OUTPATIENT
Start: 2024-12-23

## 2025-01-07 ENCOUNTER — HOSPITAL ENCOUNTER (OUTPATIENT)
Dept: RADIOLOGY | Facility: HOSPITAL | Age: 89
Discharge: HOME | End: 2025-01-07
Attending: FAMILY MEDICINE
Payer: MEDICARE

## 2025-01-07 DIAGNOSIS — Z12.31 ENCOUNTER FOR SCREENING MAMMOGRAM FOR MALIGNANT NEOPLASM OF BREAST: ICD-10-CM

## 2025-01-07 DIAGNOSIS — Z78.0 POST-MENOPAUSAL: ICD-10-CM

## 2025-01-07 PROCEDURE — 77063 BREAST TOMOSYNTHESIS BI: CPT | Mod: 26,,, | Performed by: RADIOLOGY

## 2025-01-07 PROCEDURE — 77067 SCR MAMMO BI INCL CAD: CPT | Mod: TC,PO

## 2025-01-07 PROCEDURE — 77080 DXA BONE DENSITY AXIAL: CPT | Mod: 26,,, | Performed by: RADIOLOGY

## 2025-01-07 PROCEDURE — 77067 SCR MAMMO BI INCL CAD: CPT | Mod: 26,,, | Performed by: RADIOLOGY

## 2025-01-07 PROCEDURE — 77091 TBS TECHL CALCULATION ONLY: CPT | Mod: PO

## 2025-01-07 PROCEDURE — 77092 TBS I&R FX RSK QHP: CPT | Mod: ,,, | Performed by: RADIOLOGY

## 2025-01-10 ENCOUNTER — TELEPHONE (OUTPATIENT)
Dept: PODIATRY | Facility: CLINIC | Age: 89
End: 2025-01-10
Payer: MEDICARE

## 2025-01-10 NOTE — TELEPHONE ENCOUNTER
Spoke with the patient's daughter to schedule first available appointment for 02/25/25 @ 1:20 pm. She was added to the wait list and expressed understanding of the message.

## 2025-01-10 NOTE — TELEPHONE ENCOUNTER
Called patient's daughter to reschedule appointment per provider's request. First available appointment was scheduled for 02/25/25 @ 1:20 pm. Phone went to voicemail and a message was left for a call back.

## 2025-01-15 RX ORDER — GABAPENTIN 300 MG/1
300 CAPSULE ORAL
Qty: 90 CAPSULE | Refills: 3 | Status: SHIPPED | OUTPATIENT
Start: 2025-01-15

## 2025-01-15 NOTE — TELEPHONE ENCOUNTER
No care due was identified.  [facility] Embedded Care Due Messages. Reference number: [id number].   1/15/2025 11:03:53 AM CST

## 2025-01-30 DIAGNOSIS — Z00.00 ENCOUNTER FOR MEDICARE ANNUAL WELLNESS EXAM: ICD-10-CM

## 2025-02-14 DIAGNOSIS — K25.4 GASTROINTESTINAL HEMORRHAGE ASSOCIATED WITH GASTRIC ULCER: ICD-10-CM

## 2025-02-18 RX ORDER — ATORVASTATIN CALCIUM 40 MG/1
40 TABLET, FILM COATED ORAL
Qty: 90 TABLET | Refills: 1 | Status: SHIPPED | OUTPATIENT
Start: 2025-02-18

## 2025-02-24 ENCOUNTER — TELEPHONE (OUTPATIENT)
Dept: OPHTHALMOLOGY | Facility: CLINIC | Age: 89
End: 2025-02-24
Payer: MEDICARE

## 2025-02-24 ENCOUNTER — OFFICE VISIT (OUTPATIENT)
Dept: OPTOMETRY | Facility: CLINIC | Age: 89
End: 2025-02-24
Payer: MEDICARE

## 2025-02-24 DIAGNOSIS — H40.013 OPEN ANGLE WITH BORDERLINE FINDINGS AND LOW GLAUCOMA RISK IN BOTH EYES: ICD-10-CM

## 2025-02-24 DIAGNOSIS — H52.03 HYPEROPIA WITH ASTIGMATISM AND PRESBYOPIA, BILATERAL: ICD-10-CM

## 2025-02-24 DIAGNOSIS — H52.203 HYPEROPIA WITH ASTIGMATISM AND PRESBYOPIA, BILATERAL: ICD-10-CM

## 2025-02-24 DIAGNOSIS — Z96.1 PSEUDOPHAKIA OF BOTH EYES: ICD-10-CM

## 2025-02-24 DIAGNOSIS — E11.9 CONTROLLED TYPE 2 DIABETES MELLITUS WITHOUT COMPLICATION, WITHOUT LONG-TERM CURRENT USE OF INSULIN: ICD-10-CM

## 2025-02-24 DIAGNOSIS — H11.001 PTERYGIUM OF RIGHT EYE: ICD-10-CM

## 2025-02-24 DIAGNOSIS — H52.4 HYPEROPIA WITH ASTIGMATISM AND PRESBYOPIA, BILATERAL: ICD-10-CM

## 2025-02-24 DIAGNOSIS — E11.9 TYPE 2 DIABETES MELLITUS WITHOUT RETINOPATHY: Primary | ICD-10-CM

## 2025-02-24 DIAGNOSIS — H26.493 BILATERAL POSTERIOR CAPSULAR OPACIFICATION: ICD-10-CM

## 2025-02-24 PROCEDURE — 99999 PR PBB SHADOW E&M-EST. PATIENT-LVL III: CPT | Mod: PBBFAC,,,

## 2025-02-24 PROCEDURE — 1101F PT FALLS ASSESS-DOCD LE1/YR: CPT | Mod: CPTII,S$GLB,,

## 2025-02-24 PROCEDURE — 1159F MED LIST DOCD IN RCRD: CPT | Mod: CPTII,S$GLB,,

## 2025-02-24 PROCEDURE — 1157F ADVNC CARE PLAN IN RCRD: CPT | Mod: CPTII,S$GLB,,

## 2025-02-24 PROCEDURE — 92133 CPTRZD OPH DX IMG PST SGM ON: CPT | Mod: S$GLB,,,

## 2025-02-24 PROCEDURE — 3288F FALL RISK ASSESSMENT DOCD: CPT | Mod: CPTII,S$GLB,,

## 2025-02-24 PROCEDURE — 99214 OFFICE O/P EST MOD 30 MIN: CPT | Mod: S$GLB,,,

## 2025-02-24 PROCEDURE — 2023F DILAT RTA XM W/O RTNOPTHY: CPT | Mod: CPTII,S$GLB,,

## 2025-02-24 PROCEDURE — 1126F AMNT PAIN NOTED NONE PRSNT: CPT | Mod: CPTII,S$GLB,,

## 2025-02-24 NOTE — PROGRESS NOTES
HPI    Pt here for annual diabetic exam w/ RNFL. Last exam- 1 year    Pt specs are from after cataract surgery and needs to be updated, slightly   blurry. Inner corner OS painful off and on. Pt denies flashes/floaters. Pt   using AT's PRN. Last BS this am 120.     Hemoglobin A1C       Date                     Value               Ref Range             Status                11/11/2024               7.4 (H)             4.0 - 5.6 %           Final                  05/07/2024               7.3 (H)             4.0 - 5.6 %           Final                 02/14/2024               7.3 (H)             4.0 - 5.6 %           Final               Last edited by Jie Yoo on 2/24/2025  8:39 AM.            Assessment /Plan     For exam results, see Encounter Report.    Type 2 diabetes mellitus without retinopathy    Controlled type 2 diabetes mellitus without complication, without long-term current use of insulin  -     Ambulatory referral/consult to Optometry    Open angle with borderline findings and low glaucoma risk in both eyes  -     Posterior Segment OCT Optic Nerve- Both eyes    Pterygium of right eye    Pseudophakia of both eyes    Bilateral posterior capsular opacification  -     Ambulatory referral/consult to Ophthalmology; Future; Expected date: 03/03/2025    Hyperopia with astigmatism and presbyopia, bilateral      1-2. No diabetic retinopathy or macular edema evident on today's exam OD, OS. Ed pt on importance of maintaining strict BS control due to potential for visual fluctuations and/or vision loss. Monitor with yearly dilated exam.    3. 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 Angles open, low IOP. RNFL OCT with thinning inferior temporal OD only stable to previous with no progression. Reviewed all findings with pt and emphasized the importance of monitoring yearly for changes. Continue to monitor untreated at this time.    4. Nasal pterygium of  the right eye - not in visual axis, s/p pterygium removal OS. Stable. Monitor yearly for changes.    5-6. PCIOL with posterior capsular opacification OD>>OS, now affecting BCVA. Reviewed findings with pt and recommended YAG eval. Referral placed, pt ok with waiting.    7. Discussed spectacle options with pt and released final spec rx. Ed pt on change in rx and adaptation.    RTC: 1 year for comprehensive exam or sooner prn

## 2025-02-24 NOTE — TELEPHONE ENCOUNTER
----- Message from [facility] sent at 2/24/2025 11:31 AM CST -----  Please call to schedule for YAG OD>OS. Pt is ok with waiting.

## 2025-02-28 ENCOUNTER — TELEPHONE (OUTPATIENT)
Dept: CARDIOLOGY | Facility: CLINIC | Age: 89
End: 2025-02-28
Payer: MEDICARE

## 2025-03-09 PROBLEM — I27.20 PULMONARY HYPERTENSION: Status: ACTIVE | Noted: 2025-03-09

## 2025-03-09 NOTE — PROGRESS NOTES
Subjective:    Patient ID:  Dolores B LeJeune is a 89 y.o. female who presents for Follow-up and Coronary Artery Disease        HPI  RECENT LABS NOTED CMP WITH GFR OF 33 DOWN FROM 43 HBA1C 7.4%, LDL 66, HDL 34, TRIGLYCERIDE 117, LOST SON, UPSET, HAS BEEN TIRED BEFORE, SEE ROS    Past Medical History:   Diagnosis Date    Acute MI     Anticoagulant long-term use     Carpal tunnel syndrome     Colon polyps     Coronary artery disease     DM type 2 (diabetes mellitus, type 2)     HTN (hypertension)     Hyperlipidemia     Osteoarthritis     Peripheral neuropathy      Past Surgical History:   Procedure Laterality Date    CAROTID STENT      1    CATARACT EXTRACTION W/  INTRAOCULAR LENS IMPLANT Right 2023    Dr Jessica Gant    CATARACT EXTRACTION W/  INTRAOCULAR LENS IMPLANT Left     [id number] Dr Jessica Gant    COLONOSCOPY  1/8/2008  [facility]    A 3 mm by 6 mm polyp in the cecum.  FRAGMENTS OF TUBULAR ADENOMA.    A 1 mm polyp in the recto-sigmoid colon.  HYPERPLASTIC POLYP.    Extremely redundant colon.      CORONARY ARTERY BYPASS GRAFT  01/24/2018    Dr. Onesimo DODSON. LIMA to LAD, SVG to OM1, SVG to OM2, SVG to PDA    ESOPHAGOGASTRODUODENOSCOPY N/A 6/12/2024    Procedure: ESOPHAGOGASTRODUODENOSCOPY (EGD);  Surgeon: Ramy Hollins Jr., MD;  Location: [facility];  Service: Endoscopy;  Laterality: N/A;    ESOPHAGOGASTRODUODENOSCOPY N/A 11/8/2024    Procedure: EGD (ESOPHAGOGASTRODUODENOSCOPY);  Surgeon: Ramy Hollins Jr., MD;  Location: [facility] ENDO;  Service: Endoscopy;  Laterality: N/A;    EYE SURGERY      eye surgery    HYSTERECTOMY      ovaries remain    ptyregium      TONSILLECTOMY       Family History   Problem Relation Name Age of Onset    Tremor Mother      Hypertension Mother      Heart disease Mother      Stroke Father      Glaucoma Maternal Aunt      Macular degeneration Daughter  10        Youth Degeneration     Social History     Socioeconomic History    Marital status:     Number of children: 4   Tobacco  Use    Smoking status: Former     Current packs/day: 0.00     Average packs/day: 0.5 packs/day for 16.2 years (8.1 ttl pk-yrs)     Types: Cigarettes     Start date:      Quit date: 3/15/1972     Years since quittin.0    Smokeless tobacco: Never   Substance and Sexual Activity    Alcohol use: No    Drug use: No     Social Drivers of Health     Food Insecurity: No Food Insecurity (2024)    Hunger Vital Sign     Worried About Running Out of Food in the Last Year: Never true     Ran Out of Food in the Last Year: Never true   Transportation Needs: No Transportation Needs (2024)    TRANSPORTATION NEEDS     Transportation : No   Housing Stability: Unknown (2024)    Housing Stability Vital Sign     Unable to Pay for Housing in the Last Year: No     Homeless in the Last Year: No       Review of patient's allergies indicates:   Allergen Reactions    Albuterol Other (See Comments)     Palpitations/tremor      Sulfa (sulfonamide antibiotics) Rash     Current Medications[1]    Review of Systems   Constitutional: Positive for malaise/fatigue. Negative for chills, diaphoresis, fever, night sweats and weight loss.   HENT:  Positive for hearing loss. Negative for congestion and nosebleeds.    Eyes:  Negative for blurred vision and visual disturbance.   Cardiovascular:  Negative for chest pain, claudication, cyanosis, dyspnea on exertion (MINIMAL), irregular heartbeat, leg swelling, near-syncope, orthopnea, palpitations, paroxysmal nocturnal dyspnea and syncope.   Respiratory:  Negative for cough, hemoptysis, shortness of breath and wheezing.    Endocrine: Negative for polydipsia and polyuria.   Hematologic/Lymphatic: Negative for adenopathy. Does not bruise/bleed easily.   Skin:  Negative for itching and rash.   Musculoskeletal:  Negative for back pain and falls.   Gastrointestinal:  Negative for abdominal pain, change in bowel habit, dysphagia, jaundice, melena (DARK WITH FE) and nausea.   Genitourinary:   "Negative for dysuria and flank pain.   Neurological:  Negative for brief paralysis, focal weakness, light-headedness, loss of balance, numbness and weakness.   Psychiatric/Behavioral:  Negative for altered mental status and depression. The patient is nervous/anxious.    Allergic/Immunologic: Negative for persistent infections.        Objective:      Vitals:    03/10/25 1248 03/10/25 1254   BP: (!) 144/61 136/60   Pulse: 63    Weight: 67.8 kg (149 lb 7.6 oz)    Height: 4' 11" (1.499 m)    PainSc: 0-No pain      Body mass index is 30.19 kg/m².    Physical Exam  Constitutional:       Appearance: Normal appearance. She is overweight.   HENT:      Head: Normocephalic and atraumatic.   Eyes:      General: No scleral icterus.     Extraocular Movements: Extraocular movements intact.   Neck:      Vascular: No carotid bruit.   Cardiovascular:      Rate and Rhythm: No extrasystoles are present.     Pulses:           Carotid pulses are 2+ on the right side and 2+ on the left side.       Radial pulses are 2+ on the right side and 2+ on the left side.      Heart sounds: Murmur heard.      Systolic murmur is present with a grade of 1/6 at the lower left sternal border and apex.      No friction rub. No gallop.   Abdominal:      Tenderness: There is no abdominal tenderness.   Musculoskeletal:      Cervical back: Neck supple.   Skin:     Capillary Refill: Capillary refill takes less than 2 seconds.      Coloration: Skin is pale.   Neurological:      Mental Status: She is alert and oriented to person, place, and time.      Cranial Nerves: Cranial nerve deficit ([place]) present.   Psychiatric:         Mood and Affect: Mood normal.         Speech: Speech normal.         Behavior: Behavior normal.                 ..    Chemistry        Component Value Date/Time     11/11/2024 0802    K 5.4 (H) 11/11/2024 0802     11/11/2024 0802    CO2 25 11/11/2024 0802    BUN 26 (H) 11/11/2024 0802    CREATININE 1.5 (H) 11/11/2024 0802    GLU " 164 (H) 11/11/2024 0802        Component Value Date/Time    CALCIUM 8.7 11/11/2024 0802    ALKPHOS 87 11/11/2024 0802    AST 23 11/11/2024 0802    ALT 19 11/11/2024 0802    BILITOT 0.6 11/11/2024 0802    ESTGFRAFRICA 43.2 (A) 02/24/2022 0918    EGFRNONAA 37.5 (A) 02/24/2022 0918            ..  Lab Results   Component Value Date    CHOL 123 11/11/2024    CHOL 116 (L) 12/13/2023    CHOL 106 (L) 11/09/2023     Lab Results   Component Value Date    HDL 34 (L) 11/11/2024    HDL 35 (L) 12/13/2023    HDL 37 (L) 11/09/2023     Lab Results   Component Value Date    LDLCALC 65.6 11/11/2024    LDLCALC 61.8 (L) 12/13/2023    LDLCALC 53.0 (L) 11/09/2023     Lab Results   Component Value Date    TRIG 117 11/11/2024    TRIG 96 12/13/2023    TRIG 80 11/09/2023     Lab Results   Component Value Date    CHOLHDL 27.6 11/11/2024    CHOLHDL 30.2 12/13/2023    CHOLHDL 34.9 11/09/2023     ..  Lab Results   Component Value Date    WBC 9.59 11/11/2024    HGB 11.4 (L) 11/11/2024    HCT 35.7 (L) 11/11/2024    MCV 93 11/11/2024     11/11/2024       Test(s) Reviewed  I have reviewed the following in detail:  [] Stress test   [] Angiography   [] Echocardiogram   [x] Labs   [] Other:       Assessment:         ICD-10-CM ICD-9-CM   1. 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 Nonrheumatic mitral valve regurgitation  I34.0 424.0   3. Other fatigue  R53.83 780.79   4. Pulmonary hypertension  I27.20 416.8   5. S/P CABG (coronary artery bypass graft)  Z95.1 V45.81   6. Stage 3b chronic kidney disease  N18.32 585.3     Problem List Items Addressed This Visit          Cardiac/Vascular    S/P CABG (coronary artery bypass graft)    Relevant Orders    Nuclear Stress - Cardiology Interpreted    Coronary artery disease of bypass graft of native heart with stable angina pectoris - Primary    Relevant Orders    Echo    Nuclear Stress - Cardiology Interpreted    Nonrheumatic mitral  valve regurgitation    Relevant Orders    Echo    Pulmonary hypertension    Relevant Orders    Echo       Renal/    Stage 3b chronic kidney disease       Other    Other fatigue    Relevant Orders    Echo    Nuclear Stress - Cardiology Interpreted        Plan:       NEEDS FURTHER EVALUATION CHECK ECHO, STRESS ASSESS FOR ANGINA EQUIVALENT  ALL CV CLINICALLY STABLE, STABLE ANGINA, NO HF, NO TIA, NO CLINICAL ARRHYTHMIA,CONTINUE CURRENT MEDS, EDUCATION, DIET, EXERCISE , INCREASED CV RISK WITH SIGNIFICANT CKD WHICH HAS BEEN RELATIVELY STABLE DISCUSSED PLAN WITH THE PATIENT AND HER DAUGHTER        Coronary artery disease of bypass graft of native heart with stable angina pectoris  -     Echo  -     Nuclear Stress - Cardiology Interpreted; Future    Nonrheumatic mitral valve regurgitation  -     Echo    Other fatigue  -     Echo  -     Nuclear Stress - Cardiology Interpreted; Future    Pulmonary hypertension  -     Echo    S/P CABG (coronary artery bypass graft)  -     Nuclear Stress - Cardiology Interpreted; Future    Stage 3b chronic kidney disease  Comments:  GFR 33 MONITOR    RTC Low level/low impact aerobic exercise 5x's/wk. Heart healthy diet and risk factor modification.    See labs and med orders.    Aerobic exercise 5x's/wk. Heart healthy diet and risk factor modification.    See labs and med orders.             [1]   Current Outpatient Medications:     atorvastatin (LIPITOR) 40 MG tablet, TAKE 1 TABLET(40 MG) BY MOUTH DAILY, Disp: 90 tablet, Rfl: 1    blood sugar diagnostic Strp, 1 each by Misc.(Non-Drug; Combo Route) route 2 (two) times a day., Disp: 200 each, Rfl: 3    calcium carbonate/vitamin D3 (VITAMIN D-3 ORAL), Take 1 capsule by mouth once daily., Disp: , Rfl:     clopidogreL (PLAVIX) 75 mg tablet, TAKE 1 TABLET BY MOUTH DAILY, Disp: 90 tablet, Rfl: 0    EScitalopram oxalate (LEXAPRO) 5 MG Tab, Take 1 tablet (5 mg total) by mouth once daily., Disp: 30 tablet, Rfl: 3    ferrous sulfate (FEROSUL) 325 mg  (65 mg iron) Tab tablet, Take 1 tablet (325 mg total) by mouth every evening., Disp: 90 tablet, Rfl: 1    gabapentin (NEURONTIN) 300 MG capsule, TAKE 1 CAPSULE BY MOUTH DAILY, Disp: 90 capsule, Rfl: 3    metoprolol succinate (TOPROL-XL) 25 MG 24 hr tablet, TAKE 1/2 TABLET(12.5 MG) BY MOUTH EVERY DAY, Disp: 45 tablet, Rfl: 3    mirabegron (MYRBETRIQ) 50 mg Tb24, TAKE 1 TABLET(50 MG) BY MOUTH EVERY DAY, Disp: 90 tablet, Rfl: 3    pantoprazole (PROTONIX) 40 MG tablet, Take 1 tablet (40 mg total) by mouth 2 (two) times daily., Disp: 90 tablet, Rfl: 3    polyethylene glycol (MIRALAX) 17 gram/dose powder, Take 17 g by mouth daily as needed for Constipation., Disp: , Rfl:     repaglinide (PRANDIN) 1 MG tablet, TAKE 1 TABLET(1 MG) BY MOUTH THREE TIMES DAILY BEFORE MEALS, Disp: 270 tablet, Rfl: 1

## 2025-03-10 ENCOUNTER — OFFICE VISIT (OUTPATIENT)
Dept: CARDIOLOGY | Facility: CLINIC | Age: 89
End: 2025-03-10
Payer: MEDICARE

## 2025-03-10 VITALS
WEIGHT: 149.5 LBS | BODY MASS INDEX: 30.14 KG/M2 | HEIGHT: 59 IN | SYSTOLIC BLOOD PRESSURE: 136 MMHG | HEART RATE: 63 BPM | DIASTOLIC BLOOD PRESSURE: 60 MMHG

## 2025-03-10 DIAGNOSIS — R53.83 OTHER FATIGUE: ICD-10-CM

## 2025-03-10 DIAGNOSIS — N18.32 STAGE 3B CHRONIC KIDNEY DISEASE: Chronic | ICD-10-CM

## 2025-03-10 DIAGNOSIS — I34.0 NONRHEUMATIC MITRAL VALVE REGURGITATION: Chronic | ICD-10-CM

## 2025-03-10 DIAGNOSIS — Z95.1 S/P CABG (CORONARY ARTERY BYPASS GRAFT): Chronic | ICD-10-CM

## 2025-03-10 DIAGNOSIS — I27.20 PULMONARY HYPERTENSION: ICD-10-CM

## 2025-03-10 DIAGNOSIS — I25.708 CORONARY ARTERY DISEASE OF BYPASS GRAFT OF NATIVE HEART WITH STABLE ANGINA PECTORIS: Primary | Chronic | ICD-10-CM

## 2025-03-10 PROCEDURE — 1126F AMNT PAIN NOTED NONE PRSNT: CPT | Mod: CPTII,S$GLB,, | Performed by: INTERNAL MEDICINE

## 2025-03-10 PROCEDURE — 1159F MED LIST DOCD IN RCRD: CPT | Mod: CPTII,S$GLB,, | Performed by: INTERNAL MEDICINE

## 2025-03-10 PROCEDURE — 1101F PT FALLS ASSESS-DOCD LE1/YR: CPT | Mod: CPTII,S$GLB,, | Performed by: INTERNAL MEDICINE

## 2025-03-10 PROCEDURE — 99999 PR PBB SHADOW E&M-EST. PATIENT-LVL III: CPT | Mod: PBBFAC,,, | Performed by: INTERNAL MEDICINE

## 2025-03-10 PROCEDURE — 99214 OFFICE O/P EST MOD 30 MIN: CPT | Mod: S$GLB,,, | Performed by: INTERNAL MEDICINE

## 2025-03-10 PROCEDURE — 1157F ADVNC CARE PLAN IN RCRD: CPT | Mod: CPTII,S$GLB,, | Performed by: INTERNAL MEDICINE

## 2025-03-10 PROCEDURE — 3288F FALL RISK ASSESSMENT DOCD: CPT | Mod: CPTII,S$GLB,, | Performed by: INTERNAL MEDICINE

## 2025-03-11 DIAGNOSIS — K21.9 GASTROESOPHAGEAL REFLUX DISEASE, UNSPECIFIED WHETHER ESOPHAGITIS PRESENT: ICD-10-CM

## 2025-03-11 PROBLEM — R53.83 OTHER FATIGUE: Status: ACTIVE | Noted: 2025-03-11

## 2025-03-11 RX ORDER — PANTOPRAZOLE SODIUM 40 MG/1
40 TABLET, DELAYED RELEASE ORAL 2 TIMES DAILY
Qty: 90 TABLET | Refills: 3 | Status: SHIPPED | OUTPATIENT
Start: 2025-03-11

## 2025-03-20 ENCOUNTER — PATIENT MESSAGE (OUTPATIENT)
Dept: CARDIOLOGY | Facility: HOSPITAL | Age: 89
End: 2025-03-20
Payer: MEDICARE

## 2025-03-24 ENCOUNTER — HOSPITAL ENCOUNTER (OUTPATIENT)
Dept: CARDIOLOGY | Facility: HOSPITAL | Age: 89
Discharge: HOME | End: 2025-03-24
Attending: INTERNAL MEDICINE
Payer: MEDICARE

## 2025-03-24 ENCOUNTER — HOSPITAL ENCOUNTER (OUTPATIENT)
Dept: RADIOLOGY | Facility: HOSPITAL | Age: 89
Discharge: HOME | End: 2025-03-24
Attending: INTERNAL MEDICINE
Payer: MEDICARE

## 2025-03-24 VITALS — BODY MASS INDEX: 30.04 KG/M2 | HEIGHT: 59 IN | WEIGHT: 149 LBS

## 2025-03-24 VITALS — WEIGHT: 149.06 LBS | HEIGHT: 59 IN | BODY MASS INDEX: 30.05 KG/M2

## 2025-03-24 DIAGNOSIS — R53.83 OTHER FATIGUE: ICD-10-CM

## 2025-03-24 DIAGNOSIS — I25.708 CORONARY ARTERY DISEASE OF BYPASS GRAFT OF NATIVE HEART WITH STABLE ANGINA PECTORIS: Chronic | ICD-10-CM

## 2025-03-24 DIAGNOSIS — Z95.1 S/P CABG (CORONARY ARTERY BYPASS GRAFT): Chronic | ICD-10-CM

## 2025-03-24 LAB
CV PHARM DOSE: 0.4 MG
CV STRESS BASE HR: 67 BPM
DIASTOLIC BLOOD PRESSURE: 68 MMHG
NUC REST EJECTION FRACTION: 56
OHS CV CPX 1 MINUTE RECOVERY HEART RATE: 85 BPM
OHS CV CPX 85 PERCENT MAX PREDICTED HEART RATE MALE: 112
OHS CV CPX MAX PREDICTED HEART RATE: 132
OHS CV CPX PATIENT IS FEMALE: 1
OHS CV CPX PATIENT IS MALE: 0
OHS CV CPX PEAK DIASTOLIC BLOOD PRESSURE: 65 MMHG
OHS CV CPX PEAK HEAR RATE: 89 BPM
OHS CV CPX PEAK RATE PRESSURE PRODUCT: NORMAL
OHS CV CPX PEAK SYSTOLIC BLOOD PRESSURE: 148 MMHG
OHS CV CPX PERCENT MAX PREDICTED HEART RATE ACHIEVED: 69
OHS CV CPX RATE PRESSURE PRODUCT PRESENTING: 9380
OHS CV INITIAL DOSE: 10.8 MCG/KG/MIN
OHS CV PEAK DOSE: 32.8 MCG/KG/MIN
OHS CV PHARM TIME: 1357 MIN
SYSTOLIC BLOOD PRESSURE: 140 MMHG

## 2025-03-24 PROCEDURE — 93016 CV STRESS TEST SUPVJ ONLY: CPT | Mod: ,,, | Performed by: INTERNAL MEDICINE

## 2025-03-24 PROCEDURE — 93018 CV STRESS TEST I&R ONLY: CPT | Mod: ,,, | Performed by: INTERNAL MEDICINE

## 2025-03-24 PROCEDURE — 78452 HT MUSCLE IMAGE SPECT MULT: CPT | Mod: PO

## 2025-03-24 PROCEDURE — 93306 TTE W/DOPPLER COMPLETE: CPT | Mod: 26,,, | Performed by: INTERNAL MEDICINE

## 2025-03-24 PROCEDURE — 93306 TTE W/DOPPLER COMPLETE: CPT | Mod: PO

## 2025-03-24 PROCEDURE — 63600175 PHARM REV CODE 636 W HCPCS: Mod: PO | Performed by: INTERNAL MEDICINE

## 2025-03-24 PROCEDURE — 78452 HT MUSCLE IMAGE SPECT MULT: CPT | Mod: 26,,, | Performed by: INTERNAL MEDICINE

## 2025-03-24 PROCEDURE — 93017 CV STRESS TEST TRACING ONLY: CPT | Mod: PO

## 2025-03-24 PROCEDURE — A9502 TC99M TETROFOSMIN: HCPCS | Mod: PO | Performed by: INTERNAL MEDICINE

## 2025-03-24 RX ORDER — REGADENOSON 0.08 MG/ML
0.4 INJECTION, SOLUTION INTRAVENOUS ONCE
Status: COMPLETED | OUTPATIENT
Start: 2025-03-24 | End: 2025-03-24

## 2025-03-24 RX ADMIN — TETROFOSMIN 32.8 MILLICURIE: 1.38 INJECTION, POWDER, LYOPHILIZED, FOR SOLUTION INTRAVENOUS at 01:03

## 2025-03-24 RX ADMIN — TETROFOSMIN 10.8 MILLICURIE: 1.38 INJECTION, POWDER, LYOPHILIZED, FOR SOLUTION INTRAVENOUS at 01:03

## 2025-03-24 RX ADMIN — REGADENOSON 0.4 MG: 0.08 INJECTION, SOLUTION INTRAVENOUS at 01:03

## 2025-04-04 ENCOUNTER — OFFICE VISIT (OUTPATIENT)
Dept: PODIATRY | Facility: CLINIC | Age: 89
End: 2025-04-04
Payer: MEDICARE

## 2025-04-04 VITALS — BODY MASS INDEX: 30.05 KG/M2 | HEIGHT: 59 IN | WEIGHT: 149.06 LBS

## 2025-04-04 DIAGNOSIS — E11.8 TYPE 2 DIABETES MELLITUS WITH COMPLICATION, WITHOUT LONG-TERM CURRENT USE OF INSULIN: Primary | ICD-10-CM

## 2025-04-04 DIAGNOSIS — I87.2 VENOUS INSUFFICIENCY (CHRONIC) (PERIPHERAL): ICD-10-CM

## 2025-04-04 DIAGNOSIS — B35.1 ONYCHOMYCOSIS DUE TO DERMATOPHYTE: ICD-10-CM

## 2025-04-04 DIAGNOSIS — R20.2 PARESTHESIA OF LEFT FOOT: ICD-10-CM

## 2025-04-04 DIAGNOSIS — I73.9 PVD (PERIPHERAL VASCULAR DISEASE): ICD-10-CM

## 2025-04-04 PROCEDURE — 99999 PR PBB SHADOW E&M-EST. PATIENT-LVL III: CPT | Mod: PBBFAC,,, | Performed by: PODIATRIST

## 2025-04-04 NOTE — PROGRESS NOTES
Subjective:      Patient ID: Dolores B LeJeune is a 89 y.o. female.    Chief Complaint: Nail Care and Diabetic Foot Exam (PCP 1/31/25)      Kelly is a 89 y.o. female witha past medical history of Acute MI, Anticoagulant long-term use, Carpal tunnel syndrome, Cataracts, bilateral, Colon polyps, Coronary artery disease, DM type 2 (diabetes mellitus, type 2), HTN (hypertension), Hyperlipidemia, Osteoarthritis, and Peripheral neuropathy. The patient's chief complaint consists of toenails that are in need of trimming.  Denies experiencing pain from the nails with today's exam.  Has not attempted to self treat.  She has noted an increase in sporadic neuropathy pain in the Lt. Lower leg.  Symptoms resolve on their own.  Inquires as to how this can be better managed.   Denies noticing venous stasis dermatitis or venous blistering.   Denies any additional pedal complaints.      PCP: Silvia Soto MD  Last visit: 12/24  Past Medical History:   Diagnosis Date    Acute MI     Anticoagulant long-term use     Carpal tunnel syndrome     Colon polyps     Coronary artery disease     DM type 2 (diabetes mellitus, type 2)     HTN (hypertension)     Hyperlipidemia     Osteoarthritis     Peripheral neuropathy        Past Surgical History:   Procedure Laterality Date    CAROTID STENT      1    CATARACT EXTRACTION W/  INTRAOCULAR LENS IMPLANT Right 2023    Dr Jessica Gant    CATARACT EXTRACTION W/  INTRAOCULAR LENS IMPLANT Left     [id number] Dr Jessica Gant    COLONOSCOPY  1/8/2008  [facility]    A 3 mm by 6 mm polyp in the cecum.  FRAGMENTS OF TUBULAR ADENOMA.    A 1 mm polyp in the recto-sigmoid colon.  HYPERPLASTIC POLYP.    Extremely redundant colon.      CORONARY ARTERY BYPASS GRAFT  01/24/2018    Dr. Onesimo TAI. LIMA to LAD, SVG to OM1, SVG to OM2, SVG to PDA    ESOPHAGOGASTRODUODENOSCOPY N/A 6/12/2024    Procedure: ESOPHAGOGASTRODUODENOSCOPY (EGD);  Surgeon: Ramy Hollins Jr., MD;  [facility];  Service:  Endoscopy;  Laterality: N/A;    ESOPHAGOGASTRODUODENOSCOPY N/A 2024    Procedure: EGD (ESOPHAGOGASTRODUODENOSCOPY);  Surgeon: Ramy Hollins Jr., MD;  Location: [facility];  Service: Endoscopy;  Laterality: N/A;    EYE SURGERY      eye surgery    HYSTERECTOMY      ovaries remain    ptyregium      TONSILLECTOMY         Family History   Problem Relation Name Age of Onset    Tremor Mother      Hypertension Mother      Heart disease Mother      Stroke Father      Glaucoma Maternal Aunt      Macular degeneration Daughter  10        Youth Degeneration       Social History     Socioeconomic History    Marital status:     Number of children: 4   Tobacco Use    Smoking status: Former     Current packs/day: 0.00     Average packs/day: 0.5 packs/day for 16.2 years (8.1 ttl pk-yrs)     Types: Cigarettes     Start date:      Quit date: 3/15/1972     Years since quittin.0    Smokeless tobacco: Never   Substance and Sexual Activity    Alcohol use: No    Drug use: No     Social Drivers of Health     Food Insecurity: No Food Insecurity (2024)    Hunger Vital Sign     Worried About Running Out of Food in the Last Year: Never true     Ran Out of Food in the Last Year: Never true   Transportation Needs: No Transportation Needs (2024)    TRANSPORTATION NEEDS     Transportation : No   Housing Stability: Unknown (2024)    Housing Stability Vital Sign     Unable to Pay for Housing in the Last Year: No     Homeless in the Last Year: No       Current Outpatient Medications   Medication Sig Dispense Refill    atorvastatin (LIPITOR) 40 MG tablet TAKE 1 TABLET(40 MG) BY MOUTH DAILY 90 tablet 1    blood sugar diagnostic Strp 1 each by Misc.(Non-Drug; Combo Route) route 2 (two) times a day. 200 each 3    calcium carbonate/vitamin D3 (VITAMIN D-3 ORAL) Take 1 capsule by mouth once daily.      clopidogreL (PLAVIX) 75 mg tablet TAKE 1 TABLET BY MOUTH DAILY 90 tablet 0    EScitalopram  oxalate (LEXAPRO) 5 MG Tab Take 1 tablet (5 mg total) by mouth once daily. 30 tablet 3    ferrous sulfate (FEROSUL) 325 mg (65 mg iron) Tab tablet Take 1 tablet (325 mg total) by mouth every evening. 90 tablet 1    gabapentin (NEURONTIN) 300 MG capsule TAKE 1 CAPSULE BY MOUTH DAILY 90 capsule 3    metoprolol succinate (TOPROL-XL) 25 MG 24 hr tablet TAKE 1/2 TABLET(12.5 MG) BY MOUTH EVERY DAY 45 tablet 3    mirabegron (MYRBETRIQ) 50 mg Tb24 TAKE 1 TABLET(50 MG) BY MOUTH EVERY DAY 90 tablet 3    pantoprazole (PROTONIX) 40 MG tablet TAKE 1 TABLET(40 MG) BY MOUTH TWICE DAILY 90 tablet 3    polyethylene glycol (MIRALAX) 17 gram/dose powder Take 17 g by mouth daily as needed for Constipation.      repaglinide (PRANDIN) 1 MG tablet TAKE 1 TABLET(1 MG) BY MOUTH THREE TIMES DAILY BEFORE MEALS 270 tablet 1     No current facility-administered medications for this visit.       Review of patient's allergies indicates:   Allergen Reactions    Albuterol Other (See Comments)     Palpitations/tremor      Sulfa (sulfonamide antibiotics) Rash       Hemoglobin A1C   Date Value Ref Range Status   11/11/2024 7.4 (H) 4.0 - 5.6 % Final     Comment:     ADA Screening Guidelines:  5.7-6.4%  Consistent with prediabetes  >or=6.5%  Consistent with diabetes    High levels of fetal hemoglobin interfere with the HbA1C  assay. Heterozygous hemoglobin variants (HbS, HgC, etc)do  not significantly interfere with this assay.   However, presence of multiple variants may affect accuracy.     05/07/2024 7.3 (H) 4.0 - 5.6 % Final     Comment:     ADA Screening Guidelines:  5.7-6.4%  Consistent with prediabetes  >or=6.5%  Consistent with diabetes    High levels of fetal hemoglobin interfere with the HbA1C  assay. Heterozygous hemoglobin variants (HbS, HgC, etc)do  not significantly interfere with this assay.   However, presence of multiple variants may affect accuracy.     02/14/2024 7.3 (H) 4.0 - 5.6 % Final     Comment:     ADA Screening  Guidelines:  5.7-6.4%  Consistent with prediabetes  >or=6.5%  Consistent with diabetes    High levels of fetal hemoglobin interfere with the HbA1C  assay. Heterozygous hemoglobin variants (HbS, HgC, etc)do  not significantly interfere with this assay.   However, presence of multiple variants may affect accuracy.         Review of Systems   Constitutional: Negative for chills and fever.   Cardiovascular:  Positive for leg swelling. Negative for claudication.   Skin:  Positive for color change and nail changes.   Musculoskeletal:  Positive for joint swelling. Negative for joint pain, muscle cramps and muscle weakness.   Gastrointestinal:  Negative for nausea and vomiting.   Neurological:  Positive for paresthesias. Negative for numbness.   Psychiatric/Behavioral:  Negative for altered mental status.            Objective:      Physical Exam  Constitutional:       Appearance: Normal appearance. She is not ill-appearing.   Cardiovascular:      Pulses:           Dorsalis pedis pulses are 1+ on the right side and 1+ on the left side.        Posterior tibial pulses are 1+ on the right side and 1+ on the left side.      Comments: CFT is 3 seconds bilateral.  Pedal hair growth is absent bilateral.  Varicosities noted bilateral.  Moderate non pitting lower extremity edema noted bilateral.  Toes are cool to touch bilateral.    Musculoskeletal:         General: Swelling present. No tenderness or signs of injury.      Comments: Muscle strength 5/5 in all muscle groups bilateral.  No tenderness nor crepitation with ROM of foot/ankle joints bilateral.  (-) for pain with palpation of bilateral foot and ankle.   Skin:     General: Skin is warm and dry.      Capillary Refill: Capillary refill takes more than 3 seconds.      Findings: No bruising, ecchymosis, erythema, signs of injury, laceration, lesion, petechiae, rash or wound.      Comments: Increased pedal turgor noted bilateral.  Toenails x 10 appear thickened by 2 mm, elongated  by 10 mm, and discolored with subungual debris.  Slight incurvation noted to the medial and lateral borders of bilateral hallux toenail.  No localized sign of infection noted.     Neurological:      General: No focal deficit present.      Mental Status: She is alert.      Sensory: No sensory deficit.      Motor: No weakness or atrophy.      Comments: Light touch is intact bilateral.               Assessment:       Encounter Diagnoses   Name Primary?    Type 2 diabetes mellitus with complication, without long-term current use of insulin Yes    PVD (peripheral vascular disease)     Venous insufficiency (chronic) (peripheral)     Onychomycosis due to dermatophyte     Paresthesia of left foot                  Plan:       Kelly was seen today for nail care and diabetic foot exam.    Diagnoses and all orders for this visit:    Type 2 diabetes mellitus with complication, without long-term current use of insulin    PVD (peripheral vascular disease)    Venous insufficiency (chronic) (peripheral)    Onychomycosis due to dermatophyte    Paresthesia of left foot              I counseled the patient on her conditions, their implications and medical management.    PVD and venous insufficiency remains stable with today's exam.    Recommend taking 600mg ALA QD to address LE paresthesias.     Patient instructed on proper foot hygeine. We discussed wearing proper shoe gear, daily foot inspections, never walking without protective shoe gear, never putting sharp instruments to feet    With patient's permission, nails were aggressively reduced and debrided x 10 to their soft tissue attachment mechanically and with electric , removing all offending nail and debris. Patient relates relief following the procedure. She will continue to monitor the areas daily, inspect her feet, wear protective shoe gear when ambulatory, moisturizer to maintain skin integrity and follow in this office in approximately 3 months, sooner  p.rNikkon.    RTC for routine high risk exam in three months.      Lucian Fernandez DPM

## 2025-05-19 DIAGNOSIS — I10 PRIMARY HYPERTENSION: ICD-10-CM

## 2025-05-19 DIAGNOSIS — K25.4 GASTROINTESTINAL HEMORRHAGE ASSOCIATED WITH GASTRIC ULCER: ICD-10-CM

## 2025-05-19 RX ORDER — ATORVASTATIN CALCIUM 40 MG/1
40 TABLET, FILM COATED ORAL
Qty: 90 TABLET | Refills: 1 | Status: SHIPPED | OUTPATIENT
Start: 2025-05-19

## 2025-05-19 RX ORDER — METOPROLOL SUCCINATE 25 MG/1
12.5 TABLET, EXTENDED RELEASE ORAL
Qty: 45 TABLET | Refills: 3 | Status: SHIPPED | OUTPATIENT
Start: 2025-05-19

## 2025-06-02 ENCOUNTER — TELEPHONE (OUTPATIENT)
Dept: FAMILY MEDICINE | Facility: CLINIC | Age: 89
End: 2025-06-02
Payer: MEDICARE

## 2025-06-02 DIAGNOSIS — E11.69 TYPE 2 DIABETES MELLITUS WITH HYPERLIPIDEMIA: ICD-10-CM

## 2025-06-02 DIAGNOSIS — E78.5 TYPE 2 DIABETES MELLITUS WITH HYPERLIPIDEMIA: ICD-10-CM

## 2025-06-02 DIAGNOSIS — N18.32 STAGE 3B CHRONIC KIDNEY DISEASE: ICD-10-CM

## 2025-06-02 DIAGNOSIS — I25.708 CORONARY ARTERY DISEASE OF BYPASS GRAFT OF NATIVE HEART WITH STABLE ANGINA PECTORIS: Primary | ICD-10-CM

## 2025-06-02 DIAGNOSIS — D62 ANEMIA ASSOCIATED WITH ACUTE BLOOD LOSS: ICD-10-CM

## 2025-06-09 ENCOUNTER — OFFICE VISIT (OUTPATIENT)
Dept: FAMILY MEDICINE | Facility: CLINIC | Age: 89
End: 2025-06-09
Payer: MEDICARE

## 2025-06-09 ENCOUNTER — LAB VISIT (OUTPATIENT)
Dept: LAB | Facility: HOSPITAL | Age: 89
End: 2025-06-09
Attending: FAMILY MEDICINE
Payer: MEDICARE

## 2025-06-09 VITALS
OXYGEN SATURATION: 95 % | HEART RATE: 71 BPM | HEIGHT: 59 IN | BODY MASS INDEX: 30.44 KG/M2 | SYSTOLIC BLOOD PRESSURE: 110 MMHG | WEIGHT: 151 LBS | DIASTOLIC BLOOD PRESSURE: 62 MMHG

## 2025-06-09 DIAGNOSIS — I25.708 CORONARY ARTERY DISEASE OF BYPASS GRAFT OF NATIVE HEART WITH STABLE ANGINA PECTORIS: ICD-10-CM

## 2025-06-09 DIAGNOSIS — E11.21 TYPE 2 DIABETES MELLITUS WITH DIABETIC NEPHROPATHY, WITHOUT LONG-TERM CURRENT USE OF INSULIN: ICD-10-CM

## 2025-06-09 DIAGNOSIS — N18.32 STAGE 3B CHRONIC KIDNEY DISEASE: ICD-10-CM

## 2025-06-09 DIAGNOSIS — D62 ANEMIA ASSOCIATED WITH ACUTE BLOOD LOSS: ICD-10-CM

## 2025-06-09 DIAGNOSIS — E78.5 TYPE 2 DIABETES MELLITUS WITH HYPERLIPIDEMIA: ICD-10-CM

## 2025-06-09 DIAGNOSIS — I15.2 HYPERTENSION ASSOCIATED WITH DIABETES: Primary | ICD-10-CM

## 2025-06-09 DIAGNOSIS — E11.69 TYPE 2 DIABETES MELLITUS WITH HYPERLIPIDEMIA: ICD-10-CM

## 2025-06-09 DIAGNOSIS — E11.9 CONTROLLED TYPE 2 DIABETES MELLITUS WITHOUT COMPLICATION, WITHOUT LONG-TERM CURRENT USE OF INSULIN: ICD-10-CM

## 2025-06-09 DIAGNOSIS — E11.59 HYPERTENSION ASSOCIATED WITH DIABETES: Primary | ICD-10-CM

## 2025-06-09 DIAGNOSIS — M19.90 OSTEOARTHRITIS, UNSPECIFIED OSTEOARTHRITIS TYPE, UNSPECIFIED SITE: ICD-10-CM

## 2025-06-09 PROBLEM — M06.9 RHEUMATOID ARTHRITIS, INVOLVING UNSPECIFIED SITE, UNSPECIFIED WHETHER RHEUMATOID FACTOR PRESENT: Status: ACTIVE | Noted: 2025-06-09

## 2025-06-09 LAB
ABSOLUTE EOSINOPHIL (OHS): 0.25 K/UL
ABSOLUTE MONOCYTE (OHS): 0.86 K/UL (ref 0.3–1)
ABSOLUTE NEUTROPHIL COUNT (OHS): 6.15 K/UL (ref 1.8–7.7)
ALBUMIN SERPL BCP-MCNC: 3.6 G/DL (ref 3.5–5.2)
ALBUMIN/CREAT UR: 33.3 UG/MG
ALP SERPL-CCNC: 84 UNIT/L (ref 40–150)
ALT SERPL W/O P-5'-P-CCNC: 17 UNIT/L (ref 10–44)
ANION GAP (OHS): 8 MMOL/L (ref 8–16)
AST SERPL-CCNC: 20 UNIT/L (ref 11–45)
BASOPHILS # BLD AUTO: 0.04 K/UL
BASOPHILS NFR BLD AUTO: 0.4 %
BILIRUB SERPL-MCNC: 0.6 MG/DL (ref 0.1–1)
BUN SERPL-MCNC: 29 MG/DL (ref 8–23)
CALCIUM SERPL-MCNC: 8.8 MG/DL (ref 8.7–10.5)
CHLORIDE SERPL-SCNC: 102 MMOL/L (ref 95–110)
CO2 SERPL-SCNC: 26 MMOL/L (ref 23–29)
CREAT SERPL-MCNC: 1.6 MG/DL (ref 0.5–1.4)
CREAT UR-MCNC: 42 MG/DL (ref 15–325)
EAG (OHS): 206 MG/DL (ref 68–131)
ERYTHROCYTE [DISTWIDTH] IN BLOOD BY AUTOMATED COUNT: 12.8 % (ref 11.5–14.5)
GFR SERPLBLD CREATININE-BSD FMLA CKD-EPI: 31 ML/MIN/1.73/M2
GLUCOSE SERPL-MCNC: 177 MG/DL (ref 70–110)
HBA1C MFR BLD: 8.8 % (ref 4–5.6)
HCT VFR BLD AUTO: 35.2 % (ref 37–48.5)
HGB BLD-MCNC: 10.9 GM/DL (ref 12–16)
IMM GRANULOCYTES # BLD AUTO: 0.03 K/UL (ref 0–0.04)
IMM GRANULOCYTES NFR BLD AUTO: 0.3 % (ref 0–0.5)
LYMPHOCYTES # BLD AUTO: 2.17 K/UL (ref 1–4.8)
MCH RBC QN AUTO: 29 PG (ref 27–31)
MCHC RBC AUTO-ENTMCNC: 31 G/DL (ref 32–36)
MCV RBC AUTO: 94 FL (ref 82–98)
MICROALBUMIN UR-MCNC: 14 UG/ML (ref ?–5000)
NUCLEATED RBC (/100WBC) (OHS): 0 /100 WBC
PLATELET # BLD AUTO: 211 K/UL (ref 150–450)
PMV BLD AUTO: 11 FL (ref 9.2–12.9)
POTASSIUM SERPL-SCNC: 5.2 MMOL/L (ref 3.5–5.1)
PROT SERPL-MCNC: 7.3 GM/DL (ref 6–8.4)
RBC # BLD AUTO: 3.76 M/UL (ref 4–5.4)
RELATIVE EOSINOPHIL (OHS): 2.6 %
RELATIVE LYMPHOCYTE (OHS): 22.8 % (ref 18–48)
RELATIVE MONOCYTE (OHS): 9.1 % (ref 4–15)
RELATIVE NEUTROPHIL (OHS): 64.8 % (ref 38–73)
SODIUM SERPL-SCNC: 136 MMOL/L (ref 136–145)
WBC # BLD AUTO: 9.5 K/UL (ref 3.9–12.7)

## 2025-06-09 PROCEDURE — 36415 COLL VENOUS BLD VENIPUNCTURE: CPT | Mod: PO

## 2025-06-09 PROCEDURE — 84295 ASSAY OF SERUM SODIUM: CPT

## 2025-06-09 PROCEDURE — 1159F MED LIST DOCD IN RCRD: CPT | Mod: CPTII,S$GLB,, | Performed by: FAMILY MEDICINE

## 2025-06-09 PROCEDURE — 99999 PR PBB SHADOW E&M-EST. PATIENT-LVL III: CPT | Mod: PBBFAC,,, | Performed by: FAMILY MEDICINE

## 2025-06-09 PROCEDURE — 1157F ADVNC CARE PLAN IN RCRD: CPT | Mod: CPTII,S$GLB,, | Performed by: FAMILY MEDICINE

## 2025-06-09 PROCEDURE — 83036 HEMOGLOBIN GLYCOSYLATED A1C: CPT

## 2025-06-09 PROCEDURE — 3288F FALL RISK ASSESSMENT DOCD: CPT | Mod: CPTII,S$GLB,, | Performed by: FAMILY MEDICINE

## 2025-06-09 PROCEDURE — 1126F AMNT PAIN NOTED NONE PRSNT: CPT | Mod: CPTII,S$GLB,, | Performed by: FAMILY MEDICINE

## 2025-06-09 PROCEDURE — 82043 UR ALBUMIN QUANTITATIVE: CPT

## 2025-06-09 PROCEDURE — 1160F RVW MEDS BY RX/DR IN RCRD: CPT | Mod: CPTII,S$GLB,, | Performed by: FAMILY MEDICINE

## 2025-06-09 PROCEDURE — 85025 COMPLETE CBC W/AUTO DIFF WBC: CPT

## 2025-06-09 PROCEDURE — 1101F PT FALLS ASSESS-DOCD LE1/YR: CPT | Mod: CPTII,S$GLB,, | Performed by: FAMILY MEDICINE

## 2025-06-09 PROCEDURE — 99213 OFFICE O/P EST LOW 20 MIN: CPT | Mod: S$GLB,,, | Performed by: FAMILY MEDICINE

## 2025-06-09 RX ORDER — DICLOFENAC SODIUM 10 MG/G
2 GEL TOPICAL 2 TIMES DAILY
Qty: 350 G | Refills: 5 | Status: SHIPPED | OUTPATIENT
Start: 2025-06-09

## 2025-06-09 NOTE — PROGRESS NOTES
"Subjective:       Patient ID: Dolores B LeJeune is a 89 y.o. female.    Chief Complaint: Diabetes    Pt is known to me.  Pt is here for followup of chronic medical issues.  The pt reports pain in her left wrist--not constant  but worse with  use of her wrist.  She has had no injury.  She has not been taking her Lexapro--she is stressed.  We discussed that consistency is important for that medication to work.  Her labs from this morning are pending.  She  says her glucoses are "up and down."  Her Hba1c are consistently  in the low 7s.  Discussed health maintenance with pt and will schedule appropriate studies and/or immunizations.        Diabetes      Review of Systems    Objective:      Physical Exam  Vitals and nursing note reviewed.   Constitutional:       General: She is not in acute distress.     Appearance: Normal appearance. She is well-developed. She is obese.   HENT:      Head: Normocephalic.   Eyes:      Conjunctiva/sclera: Conjunctivae normal.      Pupils: Pupils are equal, round, and reactive to light.   Neck:      Thyroid: No thyromegaly.   Cardiovascular:      Rate and Rhythm: Normal rate and regular rhythm.      Heart sounds: Normal heart sounds.   Pulmonary:      Effort: Pulmonary effort is normal.      Breath sounds: Normal breath sounds.   Abdominal:      General: Bowel sounds are normal.      Palpations: Abdomen is soft.      Tenderness: There is no abdominal tenderness.   Musculoskeletal:         General: No tenderness or deformity. Normal range of motion.      Cervical back: Normal range of motion and neck supple.      Right lower leg: No edema.      Left lower leg: No edema.   Lymphadenopathy:      Cervical: No cervical adenopathy.   Skin:     General: Skin is warm and dry.   Neurological:      Mental Status: She is alert and oriented to person, place, and time.      Cranial Nerves: No cranial nerve deficit.      Motor: No abnormal muscle tone.      Coordination: Coordination normal.      Deep " Tendon Reflexes: Reflexes normal.   Psychiatric:         Behavior: Behavior normal.         Assessment:       1. Hypertension associated with diabetes    2. Osteoarthritis, unspecified osteoarthritis type, unspecified site    3. 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 Stage 3b chronic kidney disease    5. Controlled type 2 diabetes mellitus without complication, without long-term current use of insulin    6. Type 2 diabetes mellitus with diabetic nephropathy, without long-term current use of insulin        Plan:       Kelly was seen today for diabetes.    Diagnoses and all orders for this visit:    Hypertension associated with diabetes    Osteoarthritis, unspecified osteoarthritis type, unspecified site  -     diclofenac sodium (VOLTAREN) 1 % Gel; Apply 2 g topically 2 (two) times daily.    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

## 2025-06-25 DIAGNOSIS — N39.41 URINARY INCONTINENCE, URGE: ICD-10-CM

## 2025-06-25 RX ORDER — MIRABEGRON 50 MG/1
50 TABLET, FILM COATED, EXTENDED RELEASE ORAL
Qty: 90 TABLET | Refills: 3 | Status: SHIPPED | OUTPATIENT
Start: 2025-06-25

## 2025-06-25 NOTE — TELEPHONE ENCOUNTER
No care due was identified.  [facility] Embedded Care Due Messages. Reference number: [id number].   6/25/2025 8:00:57 AM CDT

## 2025-06-25 NOTE — TELEPHONE ENCOUNTER
Refill Decision Note   Kelly LeJeune  is requesting a refill authorization.  Brief Assessment and Rationale for Refill:  Approve     Medication Therapy Plan:        Comments:     Note composed:9:58 AM 06/25/2025

## 2025-06-30 ENCOUNTER — TELEPHONE (OUTPATIENT)
Dept: FAMILY MEDICINE | Facility: CLINIC | Age: 89
End: 2025-06-30
Payer: MEDICARE

## 2025-06-30 DIAGNOSIS — E11.21 TYPE 2 DIABETES MELLITUS WITH DIABETIC NEPHROPATHY, WITHOUT LONG-TERM CURRENT USE OF INSULIN: ICD-10-CM

## 2025-06-30 DIAGNOSIS — I10 PRIMARY HYPERTENSION: Primary | ICD-10-CM

## 2025-06-30 DIAGNOSIS — R54 AGE-RELATED PHYSICAL DEBILITY: ICD-10-CM

## 2025-06-30 NOTE — TELEPHONE ENCOUNTER
----- Message from Martha sent at 6/25/2025  2:08 PM CDT -----  Contact: [facility]  Type:  Needs Medical Advice    Who Called: Aj Milian   Home health     Would the patient rather a call back or a response via [facility]? Fax    Best Call Back Number: Fax   [phone]    Additional Information: Please sign completed copies of Home health orders and return     Order num 1 # [id number]    Order num 2 # [id number]

## 2025-07-01 NOTE — TELEPHONE ENCOUNTER
----- Message from Martha sent at 6/25/2025  2:08 PM CDT -----  Contact: [facility]  Type:  Needs Medical Advice    Who Called: Aj Milian   Home health     Would the patient rather a call back or a response via [facility]? Fax    Best Call Back Number: Fax   [phone]    Additional Information: Please sign completed copies of Home health orders and return     Order num 1 # [id number]    Order num 2 # [id number]

## 2025-07-08 ENCOUNTER — OFFICE VISIT (OUTPATIENT)
Dept: PODIATRY | Facility: CLINIC | Age: 89
End: 2025-07-08
Payer: MEDICARE

## 2025-07-08 ENCOUNTER — TELEPHONE (OUTPATIENT)
Dept: ADMINISTRATIVE | Facility: HOSPITAL | Age: 89
End: 2025-07-08
Payer: MEDICARE

## 2025-07-08 VITALS — WEIGHT: 151 LBS | HEIGHT: 59 IN | BODY MASS INDEX: 30.44 KG/M2

## 2025-07-08 DIAGNOSIS — B35.1 ONYCHOMYCOSIS DUE TO DERMATOPHYTE: ICD-10-CM

## 2025-07-08 DIAGNOSIS — I73.9 PVD (PERIPHERAL VASCULAR DISEASE): ICD-10-CM

## 2025-07-08 DIAGNOSIS — I87.2 VENOUS INSUFFICIENCY (CHRONIC) (PERIPHERAL): ICD-10-CM

## 2025-07-08 DIAGNOSIS — E11.8 TYPE 2 DIABETES MELLITUS WITH COMPLICATION, WITHOUT LONG-TERM CURRENT USE OF INSULIN: Primary | ICD-10-CM

## 2025-07-08 PROCEDURE — 99999 PR PBB SHADOW E&M-EST. PATIENT-LVL III: CPT | Mod: PBBFAC,,, | Performed by: PODIATRIST

## 2025-07-08 NOTE — PROGRESS NOTES
Subjective:      Patient ID: Dolores B LeJeune is a 89 y.o. female.    Chief Complaint: No chief complaint on file.      Kelly is a 89 y.o. female witha past medical history of Acute MI, Anticoagulant long-term use, Carpal tunnel syndrome, Cataracts, bilateral, Colon polyps, Coronary artery disease, DM type 2 (diabetes mellitus, type 2), HTN (hypertension), Hyperlipidemia, Osteoarthritis, and Peripheral neuropathy. The patient's chief complaint consists of toenails that are in need of trimming.  Denies experiencing pain from the nails with today's exam.  Has not attempted to self treat.    Notes having an indentation in the skin of the Rt. Lower leg.  States this is most prominent in the morning but dissipates after she begins walking.  Denies this being a source of pain.  Inquires as to whether this is an issue that she should be concerned with. Denies noticing venous stasis dermatitis or venous blistering.   Denies any additional pedal complaints.      PCP: Silvia Soto MD  Last visit: 6/25  Past Medical History:   Diagnosis Date    Acute MI     Anticoagulant long-term use     Carpal tunnel syndrome     Colon polyps     Coronary artery disease     DM type 2 (diabetes mellitus, type 2)     HTN (hypertension)     Hyperlipidemia     Osteoarthritis     Peripheral neuropathy        Past Surgical History:   Procedure Laterality Date    CAROTID STENT      1    CATARACT EXTRACTION W/  INTRAOCULAR LENS IMPLANT Right 2023    Dr Jessica Gant    CATARACT EXTRACTION W/  INTRAOCULAR LENS IMPLANT Left     [id number] Dr Jessica Gant    COLONOSCOPY  1/8/2008  [facility]    A 3 mm by 6 mm polyp in the cecum.  FRAGMENTS OF TUBULAR ADENOMA.    A 1 mm polyp in the recto-sigmoid colon.  HYPERPLASTIC POLYP.    Extremely redundant colon.      CORONARY ARTERY BYPASS GRAFT  01/24/2018    [facility], Dr. Echols. LIMA to LAD, SVG to OM1, SVG to OM2, SVG to PDA    ESOPHAGOGASTRODUODENOSCOPY N/A 6/12/2024    Procedure: ESOPHAGOGASTRODUODENOSCOPY (EGD);   Surgeon: Ramy Hollins Jr., MD;  Location: [facility];  Service: Endoscopy;  Laterality: N/A;    ESOPHAGOGASTRODUODENOSCOPY N/A 2024    Procedure: EGD (ESOPHAGOGASTRODUODENOSCOPY);  Surgeon: Ramy Hollnis Jr., MD;  Location: [facility];  Service: Endoscopy;  Laterality: N/A;    EYE SURGERY      eye surgery    HYSTERECTOMY      ovaries remain    ptyregium      TONSILLECTOMY         Family History   Problem Relation Name Age of Onset    Tremor Mother      Hypertension Mother      Heart disease Mother      Stroke Father      Glaucoma Maternal Aunt      Macular degeneration Daughter  10        Youth Degeneration       Social History     Socioeconomic History    Marital status:     Number of children: 4   Tobacco Use    Smoking status: Former     Current packs/day: 0.00     Average packs/day: 0.5 packs/day for 16.2 years (8.1 ttl pk-yrs)     Types: Cigarettes     Start date:      Quit date: 3/15/1972     Years since quittin.3    Smokeless tobacco: Never   Substance and Sexual Activity    Alcohol use: No    Drug use: No     Social Drivers of Health     Food Insecurity: No Food Insecurity (2024)    Hunger Vital Sign     Worried About Running Out of Food in the Last Year: Never true     Ran Out of Food in the Last Year: Never true   Transportation Needs: No Transportation Needs (2024)    TRANSPORTATION NEEDS     Transportation : No   Housing Stability: Unknown (2024)    Housing Stability Vital Sign     Unable to Pay for Housing in the Last Year: No     Homeless in the Last Year: No       Current Outpatient Medications   Medication Sig Dispense Refill    atorvastatin (LIPITOR) 40 MG tablet TAKE 1 TABLET(40 MG) BY MOUTH DAILY 90 tablet 1    blood sugar diagnostic Strp 1 each by Misc.(Non-Drug; Combo Route) route 2 (two) times a day. 200 each 3    clopidogreL (PLAVIX) 75 mg tablet TAKE 1 TABLET BY MOUTH DAILY 90 tablet 0    diclofenac sodium (VOLTAREN) 1 % Gel Apply 2 g topically 2  (two) times daily. 350 g 5    EScitalopram oxalate (LEXAPRO) 5 MG Tab TAKE 1 TABLET(5 MG) BY MOUTH DAILY 90 tablet 2    ferrous sulfate (FEROSUL) 325 mg (65 mg iron) Tab tablet Take 1 tablet (325 mg total) by mouth every evening. 90 tablet 1    gabapentin (NEURONTIN) 300 MG capsule TAKE 1 CAPSULE BY MOUTH DAILY 90 capsule 3    metoprolol succinate (TOPROL-XL) 25 MG 24 hr tablet TAKE 1/2 TABLET(12.5 MG) BY MOUTH EVERY DAY 45 tablet 3    mirabegron (MYRBETRIQ) 50 mg Tb24 TAKE 1 TABLET(50 MG) BY MOUTH EVERY DAY 90 tablet 3    pantoprazole (PROTONIX) 40 MG tablet TAKE 1 TABLET(40 MG) BY MOUTH TWICE DAILY 90 tablet 3    repaglinide (PRANDIN) 1 MG tablet TAKE 1 TABLET(1 MG) BY MOUTH THREE TIMES DAILY BEFORE MEALS 270 tablet 1     No current facility-administered medications for this visit.       Review of patient's allergies indicates:   Allergen Reactions    Albuterol Other (See Comments)     Palpitations/tremor      Sulfa (sulfonamide antibiotics) Rash       Hemoglobin A1C   Date Value Ref Range Status   11/11/2024 7.4 (H) 4.0 - 5.6 % Final     Comment:     ADA Screening Guidelines:  5.7-6.4%  Consistent with prediabetes  >or=6.5%  Consistent with diabetes    High levels of fetal hemoglobin interfere with the HbA1C  assay. Heterozygous hemoglobin variants (HbS, HgC, etc)do  not significantly interfere with this assay.   However, presence of multiple variants may affect accuracy.     05/07/2024 7.3 (H) 4.0 - 5.6 % Final     Comment:     ADA Screening Guidelines:  5.7-6.4%  Consistent with prediabetes  >or=6.5%  Consistent with diabetes    High levels of fetal hemoglobin interfere with the HbA1C  assay. Heterozygous hemoglobin variants (HbS, HgC, etc)do  not significantly interfere with this assay.   However, presence of multiple variants may affect accuracy.     02/14/2024 7.3 (H) 4.0 - 5.6 % Final     Comment:     ADA Screening Guidelines:  5.7-6.4%  Consistent with prediabetes  >or=6.5%  Consistent with  diabetes    High levels of fetal hemoglobin interfere with the HbA1C  assay. Heterozygous hemoglobin variants (HbS, HgC, etc)do  not significantly interfere with this assay.   However, presence of multiple variants may affect accuracy.       Hemoglobin A1c   Date Value Ref Range Status   06/09/2025 8.8 (H) 4.0 - 5.6 % Final     Comment:     ADA Screening Guidelines:  5.7-6.4%  Consistent with prediabetes  >=6.5%  Consistent with diabetes    High levels of fetal hemoglobin interfere with the HbA1C  assay. Heterozygous hemoglobin variants (HbS, HgC, etc)do  not significantly interfere with this assay.   However, presence of multiple variants may affect accuracy.       Review of Systems   Constitutional: Negative for chills and fever.   Cardiovascular:  Positive for leg swelling. Negative for claudication.   Skin:  Positive for color change and nail changes.   Musculoskeletal:  Positive for joint swelling. Negative for joint pain, muscle cramps and muscle weakness.   Gastrointestinal:  Negative for nausea and vomiting.   Neurological:  Positive for paresthesias. Negative for numbness.   Psychiatric/Behavioral:  Negative for altered mental status.            Objective:      Physical Exam  Constitutional:       Appearance: Normal appearance. She is not ill-appearing.   Cardiovascular:      Pulses:           Dorsalis pedis pulses are 1+ on the right side and 1+ on the left side.        Posterior tibial pulses are 1+ on the right side and 1+ on the left side.      Comments: CFT is 3 seconds bilateral.  Pedal hair growth is absent bilateral.  Varicosities noted bilateral.  Moderate non pitting lower extremity edema noted bilateral.  Toes are cool to touch bilateral.    Musculoskeletal:         General: Swelling present. No tenderness or signs of injury.      Comments: Muscle strength 5/5 in all muscle groups bilateral.  No tenderness nor crepitation with ROM of foot/ankle joints bilateral.  (-) for pain with palpation of  bilateral foot and ankle.   Skin:     General: Skin is warm and dry.      Capillary Refill: Capillary refill takes more than 3 seconds.      Findings: No bruising, ecchymosis, erythema, signs of injury, laceration, lesion, petechiae, rash or wound.      Comments: Increased pedal turgor noted bilateral.  Toenails x 10 appear thickened by 2 mm, elongated by 4 mm, and discolored with subungual debris.  Slight incurvation noted to the medial and lateral borders of bilateral hallux toenail.  No localized sign of infection noted.     Neurological:      General: No focal deficit present.      Mental Status: She is alert.      Sensory: No sensory deficit.      Motor: No weakness or atrophy.      Comments: Light touch is intact bilateral.               Assessment:       Encounter Diagnoses   Name Primary?    Type 2 diabetes mellitus with complication, without long-term current use of insulin Yes    PVD (peripheral vascular disease)     Venous insufficiency (chronic) (peripheral)     Onychomycosis due to dermatophyte                  Plan:       Diagnoses and all orders for this visit:    Type 2 diabetes mellitus with complication, without long-term current use of insulin    PVD (peripheral vascular disease)    Venous insufficiency (chronic) (peripheral)    Onychomycosis due to dermatophyte              I counseled the patient on her conditions, their implications and medical management.    PVD and venous insufficiency remains stable with today's exam.    Patient instructed on proper foot hygeine. We discussed wearing proper shoe gear, daily foot inspections, never walking without protective shoe gear, never putting sharp instruments to feet    With patient's permission, nails were aggressively reduced and debrided x 10 to their soft tissue attachment mechanically and with electric , removing all offending nail and debris. Patient relates relief following the procedure. She will continue to monitor the areas daily,  inspect her feet, wear protective shoe gear when ambulatory, moisturizer to maintain skin integrity and follow in this office in approximately 3 months, sooner p.r.n.    RTC for routine high risk exam in three months.      Lucian Fernandez DPM

## 2025-07-08 NOTE — TELEPHONE ENCOUNTER
Please confirm if patient is classified appropriately as Stage 3B CKD.    Pt has no internal or external Nephrologist on file.  Please refer patient to Nephrology if appropriate.        Thank you,     Population Health Team

## 2025-08-04 ENCOUNTER — TELEPHONE (OUTPATIENT)
Dept: FAMILY MEDICINE | Facility: CLINIC | Age: 89
End: 2025-08-04
Payer: MEDICARE

## 2025-08-04 NOTE — TELEPHONE ENCOUNTER
Copied from CRM #[id number]. Topic: General Inquiry - Patient Advice  >> Aug 4, 2025  1:05 PM Myriam wrote:  Type:  Needs Medical Advice    Who Called: patient at [phone]    Additional Information: Patient would like to know if she can [place] a trip to the [place] with her health conditions, please call and advise. Thank you.

## 2025-08-10 DIAGNOSIS — E11.21 TYPE 2 DIABETES MELLITUS WITH DIABETIC NEPHROPATHY, WITHOUT LONG-TERM CURRENT USE OF INSULIN: ICD-10-CM

## 2025-08-11 RX ORDER — BLOOD-GLUCOSE METER
EACH MISCELLANEOUS
Qty: 200 STRIP | Refills: 3 | Status: SHIPPED | OUTPATIENT
Start: 2025-08-11

## 2025-08-19 ENCOUNTER — TELEPHONE (OUTPATIENT)
Dept: FAMILY MEDICINE | Facility: CLINIC | Age: 89
End: 2025-08-19
Payer: MEDICARE